# Patient Record
Sex: FEMALE | Race: WHITE | NOT HISPANIC OR LATINO | Employment: OTHER | URBAN - METROPOLITAN AREA
[De-identification: names, ages, dates, MRNs, and addresses within clinical notes are randomized per-mention and may not be internally consistent; named-entity substitution may affect disease eponyms.]

---

## 2017-01-05 ENCOUNTER — TELEPHONE (OUTPATIENT)
Dept: TRANSPLANT | Facility: CLINIC | Age: 67
End: 2017-01-05

## 2017-01-05 NOTE — TELEPHONE ENCOUNTER
----- Message from Shana Fitzgerald RN sent at 1/5/2017 10:50 AM CST -----  Contact: patient       ----- Message -----     From: Bren Jimenez     Sent: 1/4/2017  12:51 PM       To: [facility] Pre-Kidney Transplant Clinical    Calling to speak with you about some test that she is having done this month that need orders faxed. 2D echo and cardiac stress test please fax orders to  [facility]  or  please call pt at

## 2017-01-05 NOTE — TELEPHONE ENCOUNTER
Dr. Sacha Lund (neurologist) [phone] , will send letter for clearance    Colonoscopy done at [facility] 01/3/17 Dr. Polo Sears. Will get records (path report)    Cards: Dr. Jesus Carrero will need stress and echo with clearance    Will need to send order for 2nd ABO.

## 2017-01-24 ENCOUNTER — TELEPHONE (OUTPATIENT)
Dept: TRANSPLANT | Facility: CLINIC | Age: 67
End: 2017-01-24

## 2017-01-24 NOTE — TELEPHONE ENCOUNTER
----- Message from Erika Sebastian sent at 1/24/2017 10:25 AM CST -----  Contact: Gretchen cortez  Requesting a fax of pt's status at fax # [phone] if needed Gretchen can be reached at   [phone]

## 2017-02-10 ENCOUNTER — COMMITTEE REVIEW (OUTPATIENT)
Dept: TRANSPLANT | Facility: CLINIC | Age: 67
End: 2017-02-10

## 2017-02-10 NOTE — COMMITTEE REVIEW
Native Organ Dx: Membranous Glomerulonephritis      SELECTION COMMITTEE NOTE    Britt Benton was presented at selection committee on .  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  No absolute contraindications to transplant at this time.  Patient will be placed on the cadaveric wait list pending neurology clarification of seizure disorder, obtain UPC.  and final financial approval from insurance company.  Patient will return to clinic for routine appointment in 6 month(s).    I spoke with pt and informed her committee decision and need for follow up consult with an [facility] neurologist. She verbalized understanding. We also will request urine for protein/creatinine ratio.    Note written by Xena Salazar RN    ===============================================    I was present at the meeting and attest to the decision of the committee.

## 2017-02-10 NOTE — LETTER
February 10, 2017    Al Neves  [address]  [place] [address]         Dear Dr. Al Neves    Patient: Britt Benton   MR Number: [id number]   YOB: 1950     Your patient, Britt Benton, was recently discussed at the [facility]ney Selection Committee.  I am happy to inform you that Britt has been approved for transplantation pending neurology consult at [facility]. She also needs a baseline urine for protein/creatinine ratio.  She has met selection criteria for a kidney transplant related to  of Membranous Glomerulonephritis. Your patient will be placed on the cadaveric wait list pending final financial approval from insurance company.     We appreciate your confidence in allowing us to participate in your patients care.      If you have any questions or concerns, please do not hesitate to contact me.    Sincerely,      Lili Guzman MD  Medical Director, Kidney & Kidney/Pancreas Transplantation    Cc: Britt Benton/[facility].

## 2017-02-14 DIAGNOSIS — Z76.82 ORGAN TRANSPLANT CANDIDATE: Primary | ICD-10-CM

## 2017-03-22 ENCOUNTER — OFFICE VISIT (OUTPATIENT)
Dept: NEUROLOGY | Facility: CLINIC | Age: 67
End: 2017-03-22
Payer: MEDICARE

## 2017-03-22 VITALS
DIASTOLIC BLOOD PRESSURE: 88 MMHG | BODY MASS INDEX: 25.94 KG/M2 | SYSTOLIC BLOOD PRESSURE: 122 MMHG | HEART RATE: 80 BPM | HEIGHT: 63 IN | WEIGHT: 146.38 LBS

## 2017-03-22 DIAGNOSIS — G40.802 EPILEPSY WITH BOTH GENERALIZED AND FOCAL FEATURES: Primary | ICD-10-CM

## 2017-03-22 DIAGNOSIS — I63.9 STROKE OF UNKNOWN ETIOLOGY: ICD-10-CM

## 2017-03-22 DIAGNOSIS — Z76.82 ORGAN TRANSPLANT CANDIDATE: ICD-10-CM

## 2017-03-22 PROCEDURE — 99999 PR PBB SHADOW E&M-EST. PATIENT-LVL III: CPT | Mod: PBBFAC,,, | Performed by: PSYCHIATRY & NEUROLOGY

## 2017-03-22 PROCEDURE — 1157F ADVNC CARE PLAN IN RCRD: CPT | Mod: S$GLB,,, | Performed by: PSYCHIATRY & NEUROLOGY

## 2017-03-22 PROCEDURE — 1160F RVW MEDS BY RX/DR IN RCRD: CPT | Mod: S$GLB,,, | Performed by: PSYCHIATRY & NEUROLOGY

## 2017-03-22 PROCEDURE — 1126F AMNT PAIN NOTED NONE PRSNT: CPT | Mod: S$GLB,,, | Performed by: PSYCHIATRY & NEUROLOGY

## 2017-03-22 PROCEDURE — 99205 OFFICE O/P NEW HI 60 MIN: CPT | Mod: S$GLB,,, | Performed by: PSYCHIATRY & NEUROLOGY

## 2017-03-22 PROCEDURE — 1159F MED LIST DOCD IN RCRD: CPT | Mod: S$GLB,,, | Performed by: PSYCHIATRY & NEUROLOGY

## 2017-03-22 NOTE — LETTER
March 22, 2017      Briana Acevedo, BG  [address]  [place] [address]           [facility]  [address] [place] [address]  Phone: [phone]  Fax: [phone]          Patient: Britt Benton   MR Number: [id number]   YOB: 1950   Date of Visit: 3/22/2017       Dear Briana Acevedo:    Thank you for referring Britt Benton to me for evaluation. Attached you will find relevant portions of my assessment and plan of care.    If you have questions, please do not hesitate to call me. I look forward to following Britt Benton along with you.    Sincerely,    Walter Bernard MD    Enclosure  CC:  No Recipients    If you would like to receive this communication electronically, please contact [email or web address] or [phone] to request more information on [employer] access.    For providers and/or their staff who would like to refer a patient to [facility], please contact us through our one-stop-shop provider referral line, [facility], at [phone].    If you feel you have received this communication in error or would no longer like to receive these types of communications, please e-mail [email or web address]

## 2017-03-22 NOTE — PROGRESS NOTES
Is a 67-year-old right-handed patient who apparently has had an episode of several seizures.  The patient's history actually begins in December, 2015 when she reports that she had a series of strokes.  The strokes are manifested as difficulty speaking, dropping objects from the left hand, and having transient confusion.  As an example, the patient states that at the time of her stroke, she was having difficulty dressing her granddaughter and could not figure out how to put fitted sheets on her bed.  She then had repeated episodes of emesis and was finally taken to the hospital for evaluation in [place].  The patient underwent diagnostic studies demonstrating the presence of a right parietal occipital lobe infarct.    The patient apparently recovered from that incident but then in August, 2016 she apparently had a series of seizures.  The patient states that her first seizure was manifested primarily as a sudden unexplained loss of consciousness that was preceded by a fairly severe feeling of nausea.  The second episode occurred while she was in her kitchen, the patient last remembers standing at a counter and next remembers waking up having fallen backward and her kitchen and was sitting up on the kitchen floor.  She was seen by her primary care 1-2 days later and was found to have marked hypertension.  At about the same time, the patient was undergoing evaluation for her end-stage renal failure and preparing to begin peritoneal dialysis.    While in the training classes for her peritoneal dialysis, the patient had several episodes that were witnessed by family members.  The patient's friend who is with her indicates that she would all of a sudden turn head and eyes up and to the right and would not be able to answer questions or follow commands.  There did not appear to be any involuntary movements at that time but the patient was basically unresponsive with head and eyes deviated.  The patient's eyes appeared  "to be "glazed."  She had at least 3 of these episodes and finally was placed on Keppra, 250 mg twice a day in September, 2016.  It should be noted that at the time that these events occurred, the patient was severely uremic.    The patient has not had any further seizure or suggestion of seizure.  She's not had any further episodes of unexplained loss of consciousness.  The patient is now on her peritoneal dialysis nightly and indicates that her laboratory values have significantly improved.  She is now normotensive and has been able to be removed for most of her antihypertensive medications.  The patient who was previously being treated for diabetes, is no longer on medications for diabetes either.  The patient is now in consideration for renal transplant.      ROS:  GENERAL: No fever, chills, fatigability or weight loss.  SKIN: No rashes, itching or changes in color or texture of skin.  HEAD: No headaches or recent head trauma.  EYES: Visual acuity fine. No photophobia, ocular pain or diplopia.  EARS: Denies ear pain, discharge or vertigo.  NOSE: No loss of smell, no epistaxis or postnasal drip.  MOUTH & THROAT: No hoarseness or change in voice. No excessive gum bleeding.  NODES: Denies swollen glands.  CHEST: Denies BISWAS, cyanosis, wheezing, cough and sputum production.  CARDIOVASCULAR: Denies chest pain, PND, orthopnea or reduced exercise tolerance.  ABDOMEN: Appetite fine. No weight loss. Denies diarrhea, abdominal pain, hematemesis or blood in stool.  URINARY: No flank pain, dysuria or hematuria.  PERIPHERAL VASCULAR: No claudication or cyanosis.  MUSCULOSKELETAL: No joint stiffness or swelling.   NEUROLOGIC: No history of  paralysis, alteration of gait or coordination.    PAST HISTORY:  Surgery: Hysterectomy, oophorectomy, right benign breast biopsy, renal biopsy, peritoneal catheter insertion  Medical: Hypertension, hyperlipidemia, hypothyroidism, stroke, history of diabetes mellitus type 2, chronic renal " failure, end-stage  ALLERGIES: NO KNOWN DRUG ALLERGIES    FAMILY HISTORY:  The patient's parents are both .  The patient's father had a GI cancer and her mother also had a GI tract cancer.  She has a sister has had colon cancer and also has diabetes mellitus.  There is no known family history of epilepsy or other forms of seizure.    SOCIAL HISTORY:  The patient is not at this time utilizing alcoholic beverage.  She is a former smoker who has quit smoking.  She smoked for a very brief period of time for about 6 months in .  The patient is a retired .    PE:   VITAL SIGNS: Blood pressure 122/88, pulse 80, weight 66.4 kg, height 5 foot 3 inches, BMI 25.93   APPEARANCE: Well nourished, well developed, in no acute distress.    HEAD: Normocephalic, atraumatic.  EYES: PERRL. EOMI.  Non-icteric sclerae.    EARS: TM's intact. Light reflex normal. No retraction or perforation.    NOSE: Mucosa pink. Airway clear.  MOUTH & THROAT: No tonsillar enlargement. No pharyngeal erythema or exudate. No stridor.  NECK: Supple. No bruits.  CHEST: Lungs clear to auscultation.  CARDIOVASCULAR: Regular rhythm without significant murmurs.  ABDOMEN: Bowel sounds normal. Not distended.   MUSCULOSKELETAL:  No bony deformity seen.  Muscle tone and muscle mass are normal in both upper and both lower extremities.  NEUROLOGIC:   Mental status: The patient is oriented to person, place, time.  The patient is able to recall 3 unrelated words at 3 minutes without difficulty.  The patient is able to focus on the exam and follow 3-step commands without difficulty.  The patient has intact language function including naming and syntax.  The patient shows good awareness of current events and has a good fund of general knowledge.  The patient is pleasant and cooperative for the examination.    Cranial nerves:  II: visual fields are intact by confrontation.  The patient is able to perceive the color red as the same intensity in each eye.   Funduscopic examination does not show any evidence of papilledema, hemorrhage, or exudate.  Visual acuity with hand held chart is 20/30 left eye, 20/100 right eye.  (The patient states that she is being evaluated for cataract extraction and lens implant)  III, IV, VI: The extra ocular muscles are intact in the cardinal directions of gaze.  No ptosis is present.  Pupils are briskly reactive to light bilaterally.  V: Facial sensation is intact to light touch in all 3 divisions of the fifth cranial nerve.  Masseter function is equally strong.  Corneal reflexes are present and brisk.  VII: Facial features are symmetrical.  The patient has a symmetrical grimace, forced eyelid closure, and for head elevation.  VIII: Hearing is intact to voice and finger rub.  IX, X: The patient's uvula elevates in the midline.  Upon phonation, there is symmetrical elevation of the palate.  Gag reflex is intact bilaterally.  XI: The patient has a symmetrical shoulder shrug.  The sternocleidomastoid muscles are symmetrically strong.  No atrophy is present.  XII: The patient's tongue protrudes in the midline.  There is no atrophy present.  Articulation is clear without dysarthria.    Gait and Station: Romberg is negative.  The patient ambulates with a good alternate arm swing.  The patient is able to heel walk and toe walk.    Motor: Manual muscle testing of both upper and lower extremities shows grade 5/5 strength bilaterally.  The patient's strength is normal for age and body habitus.  Examination of the extremities does not show any evidence of atrophy or other deformity.  Muscle tone is normal both upper and lower extremities.    Sensory: The patient has intact perception of pinprick, light touch, and vibration in both upper and lower extremities.  Position sense is intact in both upper and lower extremities.  The patient does not extinct on double simultaneous sensory stimulation.    Cerebellar: The patient is able to perform  finger-to-nose and heel-to-shin without difficulty.  No involuntary movements are seen at rest.  Muscle tone is normal.  Rapid alternating movements are done without difficulty.  Coordination of alternating movements is normal.    Reflexes: Stretch reflexes including biceps, triceps, knees, and ankles are normal bilaterally.  Plantar stimulation is flexor bilaterally.  No pathological reflexes are seen.    MRI brain:  The patient was able to bring with her copies of the MRI brain that was done at the time of her stroke and subsequently at the time of her seizure.  There is evidence of a right parietal occipital area of cerebral infarction without hemorrhage or mass effect.  This appears to be an older stroke.  No acute changes are otherwise seen.    ASSESSMENT:  1.  History of stroke, distal branches, right middle cerebral artery  2.  History of focal seizure  3.  End-stage renal disease currently on peritoneal dialysis    DISCUSSION:  The patient has had a series of 3 seizures that occurred at the time of difficulty and uremia, but the patient does have the risk factor for seizure in the form of her stroke that had occurred previously.  Although the patient is now asymptomatic, I would recommend that the patient continue taking Keppra 250 mg twice a day.  The presence of the prior stroke as well as the presence of her seizure disorder does not preclude the patient from undergoing renal transplant.  The patient's risk for seizure is extremely low as long as she continues taking her Keppra.  At the time of the surgical procedure, the patient may receive Keppra intravenously until she can take oral medication.  She is already on a renally adjusted dose for the Keppra.  This does not need to be changed.

## 2017-04-25 ENCOUNTER — TELEPHONE (OUTPATIENT)
Dept: TRANSPLANT | Facility: CLINIC | Age: 67
End: 2017-04-25

## 2017-04-25 NOTE — TELEPHONE ENCOUNTER
----- Message from Era Rahamn sent at 4/25/2017  9:27 AM CDT -----  Contact: pt  Calling to speak with Chelsea wants to know about a form that was suppose to be faxed from [facility] @ # [phone].

## 2017-04-25 NOTE — TELEPHONE ENCOUNTER
Patient informed that we are still awaiting [phone] for listing. 2728 was requested 4 times already. Patient is strongly encouraged to contact her unit for a copy as she was informed that she cannot be listed without it. Patient verbalized understanding.

## 2017-05-04 ENCOUNTER — TELEPHONE (OUTPATIENT)
Dept: TRANSPLANT | Facility: CLINIC | Age: 67
End: 2017-05-04

## 2017-05-04 NOTE — TELEPHONE ENCOUNTER
Patient informed that all of her information was sent to procurement for listing. Patient verbalized understanding.

## 2017-05-04 NOTE — TELEPHONE ENCOUNTER
----- Message from Shana Fitzgerald RN sent at 5/4/2017  1:06 PM CDT -----  Contact: pt      ----- Message -----     From: Bree Whiteside     Sent: 5/4/2017   8:36 AM       To: [facility] Pre-Kidney Transplant Clinical    Asking if fax was received from [facility] and is there anything else needed to become listed. Please call back [phone]

## 2017-05-05 DIAGNOSIS — Z76.82 AWAITING ORGAN TRANSPLANT STATUS: Primary | ICD-10-CM

## 2017-07-10 ENCOUNTER — PATIENT MESSAGE (OUTPATIENT)
Dept: TRANSPLANT | Facility: CLINIC | Age: 67
End: 2017-07-10

## 2017-07-14 ENCOUNTER — LAB VISIT (OUTPATIENT)
Dept: LAB | Facility: HOSPITAL | Age: 67
End: 2017-07-14
Payer: MEDICARE

## 2017-07-14 DIAGNOSIS — Z76.82 AWAITING ORGAN TRANSPLANT STATUS: ICD-10-CM

## 2017-07-14 PROCEDURE — 86833 HLA CLASS II HIGH DEFIN QUAL: CPT | Mod: PO,TXP

## 2017-07-14 PROCEDURE — 86832 HLA CLASS I HIGH DEFIN QUAL: CPT | Mod: PO,TXP

## 2017-08-01 DIAGNOSIS — Z76.82 ORGAN TRANSPLANT CANDIDATE: ICD-10-CM

## 2017-08-10 LAB — HPRA INTERPRETATION: NORMAL

## 2017-08-18 LAB
CLASS I ANTIBODIES - LUMINEX: NEGATIVE
CLASS II ANTIBODIES - LUMINEX: NEGATIVE
CPRA %: 0
SERUM COLLECTION DT - LUMINEX CLASS I: NORMAL
SERUM COLLECTION DT - LUMINEX CLASS II: NORMAL
SPCL1 TESTING DATE: NORMAL
SPCL2 TESTING DATE: NORMAL
SPCLU TESTING DATE: NORMAL

## 2017-08-21 DIAGNOSIS — Z76.82 ORGAN TRANSPLANT CANDIDATE: Primary | ICD-10-CM

## 2017-09-11 ENCOUNTER — LAB VISIT (OUTPATIENT)
Dept: LAB | Facility: HOSPITAL | Age: 67
End: 2017-09-11
Payer: MEDICARE

## 2017-09-11 DIAGNOSIS — Z76.82 ORGAN TRANSPLANT CANDIDATE: ICD-10-CM

## 2017-09-11 PROCEDURE — 86829 HLA CLASS I/II ANTIBODY QUAL: CPT | Mod: PO,TXP

## 2017-09-11 PROCEDURE — 86832 HLA CLASS I HIGH DEFIN QUAL: CPT | Mod: PO,TXP

## 2017-09-11 PROCEDURE — 86833 HLA CLASS II HIGH DEFIN QUAL: CPT | Mod: PO,TXP

## 2017-09-11 PROCEDURE — 86829 HLA CLASS I/II ANTIBODY QUAL: CPT | Mod: 91,PO,TXP

## 2017-09-21 LAB — HPRA INTERPRETATION: NORMAL

## 2017-10-05 LAB
CLASS I ANTIBODY COMMENTS - LUMINEX: NORMAL
CLASS II ANTIBODIES - LUMINEX: NEGATIVE
CPRA %: 0
SERUM COLLECTION DT - LUMINEX CLASS I: NORMAL
SERUM COLLECTION DT - LUMINEX CLASS II: NORMAL
SPCL1 TESTING DATE: NORMAL
SPCL2 TESTING DATE: NORMAL
SPCLU TESTING DATE: NORMAL

## 2017-12-15 ENCOUNTER — LAB VISIT (OUTPATIENT)
Dept: LAB | Facility: HOSPITAL | Age: 67
End: 2017-12-15
Payer: MEDICARE

## 2017-12-15 DIAGNOSIS — Z76.82 AWAITING ORGAN TRANSPLANT STATUS: ICD-10-CM

## 2017-12-15 PROCEDURE — 86833 HLA CLASS II HIGH DEFIN QUAL: CPT | Mod: PO,TXP

## 2017-12-15 PROCEDURE — 86832 HLA CLASS I HIGH DEFIN QUAL: CPT | Mod: PO,TXP

## 2017-12-27 LAB — HPRA INTERPRETATION: NORMAL

## 2018-01-08 ENCOUNTER — TELEPHONE (OUTPATIENT)
Dept: RADIOLOGY | Facility: HOSPITAL | Age: 68
End: 2018-01-08

## 2018-01-09 ENCOUNTER — OFFICE VISIT (OUTPATIENT)
Dept: TRANSPLANT | Facility: CLINIC | Age: 68
End: 2018-01-09
Payer: MEDICARE

## 2018-01-09 ENCOUNTER — HOSPITAL ENCOUNTER (OUTPATIENT)
Dept: RADIOLOGY | Facility: HOSPITAL | Age: 68
Discharge: HOME | End: 2018-01-09
Attending: NURSE PRACTITIONER
Payer: MEDICARE

## 2018-01-09 ENCOUNTER — HOSPITAL ENCOUNTER (OUTPATIENT)
Dept: CARDIOLOGY | Facility: CLINIC | Age: 68
Discharge: HOME | End: 2018-01-09
Payer: MEDICARE

## 2018-01-09 VITALS
HEART RATE: 84 BPM | SYSTOLIC BLOOD PRESSURE: 167 MMHG | RESPIRATION RATE: 16 BRPM | OXYGEN SATURATION: 98 % | WEIGHT: 151.44 LBS | DIASTOLIC BLOOD PRESSURE: 99 MMHG | TEMPERATURE: 98 F | HEIGHT: 63 IN | BODY MASS INDEX: 26.83 KG/M2

## 2018-01-09 DIAGNOSIS — Z76.82 PATIENT ON WAITING LIST FOR KIDNEY TRANSPLANT: Chronic | ICD-10-CM

## 2018-01-09 DIAGNOSIS — Z79.01 CURRENT USE OF LONG TERM ANTICOAGULATION: Chronic | ICD-10-CM

## 2018-01-09 DIAGNOSIS — Z76.82 ORGAN TRANSPLANT CANDIDATE: ICD-10-CM

## 2018-01-09 DIAGNOSIS — G40.802 EPILEPSY WITH BOTH GENERALIZED AND FOCAL FEATURES: ICD-10-CM

## 2018-01-09 DIAGNOSIS — N18.9 ANEMIA OF RENAL DISEASE: Chronic | ICD-10-CM

## 2018-01-09 DIAGNOSIS — I10 ESSENTIAL HYPERTENSION: Chronic | ICD-10-CM

## 2018-01-09 DIAGNOSIS — D63.1 ANEMIA OF RENAL DISEASE: Chronic | ICD-10-CM

## 2018-01-09 DIAGNOSIS — Z99.2 ESRD ON PERITONEAL DIALYSIS: Primary | Chronic | ICD-10-CM

## 2018-01-09 DIAGNOSIS — I63.9 STROKE OF UNKNOWN ETIOLOGY: ICD-10-CM

## 2018-01-09 DIAGNOSIS — E11.21 TYPE 2 DIABETES MELLITUS WITH DIABETIC NEPHROPATHY, UNSPECIFIED LONG TERM INSULIN USE STATUS: Chronic | ICD-10-CM

## 2018-01-09 DIAGNOSIS — N18.6 ESRD ON PERITONEAL DIALYSIS: Primary | Chronic | ICD-10-CM

## 2018-01-09 LAB — DIASTOLIC DYSFUNCTION: NO

## 2018-01-09 PROCEDURE — A9502 TC99M TETROFOSMIN: HCPCS | Mod: TXP

## 2018-01-09 PROCEDURE — 71046 X-RAY EXAM CHEST 2 VIEWS: CPT | Mod: 26,TXP,, | Performed by: RADIOLOGY

## 2018-01-09 PROCEDURE — 71046 X-RAY EXAM CHEST 2 VIEWS: CPT | Mod: TC,TXP

## 2018-01-09 PROCEDURE — 99999 PR PBB SHADOW E&M-EST. PATIENT-LVL IV: CPT | Mod: PBBFAC,TXP,,

## 2018-01-09 PROCEDURE — 78452 HT MUSCLE IMAGE SPECT MULT: CPT | Mod: 26,TXP,, | Performed by: RADIOLOGY

## 2018-01-09 PROCEDURE — 99215 OFFICE O/P EST HI 40 MIN: CPT | Mod: S$GLB,TXP,, | Performed by: INTERNAL MEDICINE

## 2018-01-09 PROCEDURE — 93015 CV STRESS TEST SUPVJ I&R: CPT | Mod: S$GLB,TXP,, | Performed by: INTERNAL MEDICINE

## 2018-01-09 RX ORDER — DOCUSATE SODIUM 100 MG/1
100 CAPSULE, LIQUID FILLED ORAL 2 TIMES DAILY
COMMUNITY
End: 2022-09-19

## 2018-01-09 RX ORDER — FERROUS SULFATE 324(65)MG
325 TABLET, DELAYED RELEASE (ENTERIC COATED) ORAL DAILY
COMMUNITY
End: 2018-08-10

## 2018-01-09 RX ORDER — CHOLECALCIFEROL (VITAMIN D3) 25 MCG
5000 TABLET ORAL DAILY
Status: ON HOLD | COMMUNITY
End: 2021-03-29 | Stop reason: HOSPADM

## 2018-01-09 RX ORDER — MONTELUKAST SODIUM 10 MG/1
10 TABLET ORAL DAILY
Status: ON HOLD | COMMUNITY
End: 2021-03-26 | Stop reason: SDUPTHER

## 2018-01-09 RX ORDER — LEVOCETIRIZINE DIHYDROCHLORIDE 5 MG/1
5 TABLET, FILM COATED ORAL NIGHTLY PRN
COMMUNITY
End: 2019-02-21

## 2018-01-09 NOTE — PROGRESS NOTES
Kidney Transplant Recipient Reevalulation    Referring Physician: Al Neves  Current Nephrologist: Al Neves  Waitlist Status: active  Dialysis Start Date: 9/27/2016    Subjective:     CC:  Six month reassessment of kidney transplant candidacy.    HPI:  Ms. Benton is a 67 y.o. 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  She has been on the wait list for a kidney transplant at [facility] since 9/27/2016. Patient is currently on peritoneal dialysis started on 9/27/2016. Patient is dialyzing on cyclic peritoneal dialysis.  Patient reports that she is tolerating dialysis well. . She has a PD catheter. Patient denies any recent hospitalizations or ED visits.      Exercises regularly walking 2 miles /day. She tracks her steps  with a fit bit.  She also attends curves 5 x / week   Overall feels well. No health concerns today.   Denies chest pain, SOB, leg pain, abdominal pain or LUTs.  Denies peritonitis     1/9/2018  Chest xray  Impression    No acute process seen.       1/9/2018  NM spect  Narrative   Findings: Patient was administered 11.4 and 32.3 mCi of technetium 99m labeled Cardiolite for the resting and stress part of the exam. Patient was stressed using 0.4 mg of eden scan.: There is mild breast attenuation. There is normal perfusion at stress and rest. There is no evidence of ischemia or infarct. Ejection fraction is 76% stress, 71% rest. These are overestimation's. End diastolic volume is 42 mL, systolic volume is 10 mL. These are underestimation. There is good thickening and contraction of all myocardial walls.   Impression    Normal myocardial perfusion scan. This           Past Medical History:   Diagnosis Date    Anemia of renal disease     ESRD on peritoneal dialysis     Essential hypertension     Hyperlipidemia     Hypothyroidism     Stroke x2 in 2014 and 2015     Type 2 diabetes mellitus with diabetic nephropathy     Vitamin D deficiency        Review of  "Systems   Constitutional: Negative for activity change, appetite change, chills, fatigue, fever and unexpected weight change.   HENT: Negative for congestion, facial swelling, postnasal drip, rhinorrhea, sinus pressure, sore throat and trouble swallowing.    Eyes: Negative for pain, redness and visual disturbance.   Respiratory: Negative for cough, chest tightness, shortness of breath and wheezing.    Cardiovascular: Negative.  Negative for chest pain, palpitations and leg swelling.   Gastrointestinal: Positive for abdominal distention. Negative for abdominal pain, diarrhea, nausea and vomiting.        PD catheter   Genitourinary: Negative for dysuria, flank pain and urgency.   Musculoskeletal: Negative for gait problem, neck pain and neck stiffness.   Skin: Negative for rash.   Allergic/Immunologic: Negative for environmental allergies, food allergies and immunocompromised state.   Neurological: Negative for dizziness, weakness, light-headedness and headaches.   Psychiatric/Behavioral: Negative for agitation and confusion. The patient is not nervous/anxious.        Objective:   body mass index is 27.17 kg/m².  BP (!) 167/99 (BP Location: Left arm, Patient Position: Sitting, BP Method: Medium (Automatic))   Pulse 84   Temp 97.8 °F (36.6 °C) (Oral)   Resp 16   Ht 5' 2.6" (1.59 m)   Wt 68.7 kg (151 lb 7.3 oz)   SpO2 98%   BMI 27.17 kg/m²     Physical Exam   Constitutional: She is oriented to person, place, and time. She appears well-developed and well-nourished.   HENT:   Head: Normocephalic.   Mouth/Throat: Oropharynx is clear and moist. No oropharyngeal exudate.   Eyes: Conjunctivae and EOM are normal. Pupils are equal, round, and reactive to light. No scleral icterus.   Neck: Normal range of motion. Neck supple.   Cardiovascular: Normal rate, regular rhythm and normal heart sounds.    Pulmonary/Chest: Effort normal and breath sounds normal.   Abdominal: Soft. Normal appearance and bowel sounds are normal. " She exhibits no distension and no mass. There is no splenomegaly or hepatomegaly. There is no tenderness. There is no rebound, no guarding, no CVA tenderness, no tenderness at McBurney's point and negative Batres's sign.       Musculoskeletal: Normal range of motion. She exhibits no edema.   Lymphadenopathy:     She has no cervical adenopathy.   Neurological: She is alert and oriented to person, place, and time. She exhibits normal muscle tone. Coordination normal.   Skin: Skin is warm and dry.   Psychiatric: She has a normal mood and affect. Her behavior is normal.   Vitals reviewed.      Labs:  Lab Results   Component Value Date    WBC 8.87 11/15/2016    HGB 7.9 (L) 11/15/2016    HCT 24.2 (L) 11/15/2016     11/15/2016    K 3.0 (L) 11/15/2016     11/15/2016    CO2 24 11/15/2016    BUN 57 (H) 11/15/2016    CREATININE 10.1 (H) 11/15/2016    EGFRNONAA 3.6 (A) 11/15/2016    CALCIUM 9.3 11/15/2016    PHOS 6.2 (H) 11/15/2016    ALBUMIN 3.1 (L) 11/15/2016    AST 11 11/15/2016    ALT 10 11/15/2016    .0 (H) 11/15/2016       No results found for: PREALBUMIN, BILIRUBINUA, GGT, AMYLASE, LIPASE, PROTEINUA, NITRITE, RBCUA, WBCUA    No results found for: HLAABCTYPE    Lab Results   Component Value Date    CPRA 0 12/11/2017    MI0YHAE Negative 12/11/2017    CIABCLM WEAK B*40:02 09/05/2017    CIIAB Negative 12/11/2017       Labs were reviewed with the patient.    Pre-transplant Workup:   Reviewed with the patient.    Assessment:     1. ESRD on peritoneal dialysis    2. Patient on waiting list for kidney transplant    3. Anemia of renal disease    4. Type 2 diabetes mellitus with diabetic nephropathy, unspecified long term insulin use status    5. Essential hypertension    6. Epilepsy with both generalized and focal features    7. Stroke x3 in 2014 and 2015    8. Current use of long term anticoagulation--Plavix        Plan:   colonoscopy due 1/2020  MMG due 5/2018      Transplant Candidacy:   Ms. Benton is a  suitable kidney transplant candidate.  She remains in overall stable health, and will remain active on the transplant list.    Briana Acevedo NP       Follow-up:   In addition to the tests noted in the plan, Ms. Benton will continue to have reevaluation as per the standing pre-kidney transplant protocol:  1. Monthly blood for PRA  2. Annual return to clinic, except HIV positive, > 65 years of age, or pancreas transplant candidates who will be scheduled to see transplant every 6 months while in pre-transplant phase  3. Annual re-testing: CXR, EKG, yearly mammograms for women over 40 and PSA for males over 40, cardiology follow-up as recommended by initial cardiology pre-transplant evaluation  4. Renal ultrasound every 2 years  5. Baseline colonoscopy after age 50 and repeated as recommended    UNOS Patient Status  Functional Status: 60% - Requires occasional assistance but is able to care for needs  Physical Capacity: No Limitations

## 2018-01-09 NOTE — PROGRESS NOTES
"Transplant Recipient Adult Psychosocial Assessment (UPDATE)    Britt Benton  [address]  [facility] [address]    Telephone Information:   Mobile [phone]   Home  [phone] (home)  Work  There is no work phone number on file.  E-mail  [email or web address]    Sex: female  YOB: 1950  Age: 67 y.o.    Encounter Date: 1/9/2018  [place] Citizen: yes  Primary Language: English   Needed: no    Emergency Contact:  Name: Kali Luna (pronounced "East")  Relationship: friend (Pt and Kali report that they've been friends since they were 10 yo)  Address: [address] [place] [address]  Phone Numbers:  [phone] (home), [phone] (mobile)    Family/Social Support:   Number of dependents/: Pt reports no dependents.  Marital history: Pt reports 1 previous marriage which ended in divorce. Pt reports no current significant other.  Other family dynamics: Pt presents with close friends: Kali and Lana Luna; sister: Esmer; and 2 adult children: Daniel and Joe whom pt  Identifies as supportive. Pt also reports 3 brothers: Robert, Eric, and Carlos.    Household Composition:  Name: Britt Benton  Age: 67  Relationship: patient  Does person drive? yes    Do you and your caregivers have access to reliable transportation? yes     PRIMARY CAREGIVER: Orin Luna (friend) will be primary caregiver, phone number [phone].      provided in-depth information to patient and caregiver regarding pre- and post-transplant caregiver role.   strongly encourages patient and caregiver to have concrete plan regarding post-transplant care giving, including back-up caregiver(s) to ensure care giving needs are met as needed.    Patient and Caregiver states understanding all aspects of caregiver role/commitment and is able/willing/committed to being caregiver to the fullest extent necessary.    Patient and Caregiver verbalizes " "understanding of the education provided today and caregiver responsibilities.         remains available. Patient and Caregiver agree to contact  in a timely manner if concerns arise.      Able to take time off work without financial concerns: yes.     Additional Significant Others who will Assist with Transplant:  Name: Esmer Adams  Age: 85  Phone: [phone]  City: [place] State: [place]  Relationship: sister  Does person drive? yes    Living Will: no Education and information provided to pt. Pt reports that she has already discussed her end-of-life desires with her sons.  Daniel Benton ([phone])  Healthcare Power of : no Education and information provided to pt. Pt reports that she has already discussed her end-of-life desires with her sons.  Daniel Benton ([phone])  Advance Directives on file: <<no information> per medical record.  Verbally reviewed LW/HCPA information.   provided patient with copy of LW/HCPA documents and provided education on completion of forms.    Living Donors: No. Education and resource information given to patient.    Highest Education Level: High School (9-12) or GED  Reading Ability: college  Reports difficulty with: seeing and wears bifocals  Learns Best By:  Pt reports pt learns best by a combination of verbal, written, and tactile instructions.     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    Spouse/Significant Other Employment: Pt reports no current significant other.    Disabled: no    Monthly Income:  longterm: $1817  SSI: $161     Able to afford all costs now and if transplanted, including medications: yes. Pt reports that she "hardly has any bills", budgets well, and reports family and friends will assist if necessary.  Patient and Caregiver verbalizes understanding " of personal responsibilities related to transplant costs and the importance of having a financial plan to ensure that patients transplant costs are fully covered.       provided fundraising information/education. Patient and Caregiververbalizes understanding.   remains available.    Insurance:   Payor/Plan Subscr  Sex Relation Sub. Ins. ID Effective Group Num   1. [employer] MANAGE* KATIE KNOX 1950 Female  [id number] 1/1/15 [id number]                                   [address] [address]     Primary Insurance (for UNOS reporting): Public Insurance - Medicare & Choice  Secondary Insurance (for UNOS reporting): None  Patient and Caregiver verbalizes clear understanding that patient may experience difficulty obtaining and/or be denied insurance coverage post-surgery. This includes and is not limited to disability insurance, life insurance, health insurance, burial insurance, long term care insurance, and other insurances.      Patient and Caregiver also reports understanding that future health concerns related to or unrelated to transplantation may not be covered by patient's insurance.  Resources and information provided and reviewed.     Patient and Caregiver provides verbal permission to release any necessary information to outside resources for patient care and discharge planning.  Resources and information provided are reviewed.      Dialysis Adherence: Patient reports being on peritoneal dialysis attending all dialysis appointments and completing the entire course of treatment. SW faxed an adherence form (see Letters section) and is awaiting a fax back.    Infusion Service: patient utilizing? no  Home Health: patient utilizing? no Pt reports using HH in Aug. 2016 after her surgery. Pt reports they came out for wound care for 3-4 days.  DME: yes PD equipment, BP Cuff, and Glucometer  Pulmonary/Cardiac Rehab: Pt denies   ADLS:  Pt reports no difficulties with driving, walking, bathing,  "cooking, housekeeping, eating, shopping, and taking medication.    Adherence:   Pt reports good adherence with medications, dialysis, and health regimen.  Adherence education and counseling provided.     Per History Section:  Past Medical History:   Diagnosis Date    Anemia of renal disease     ESRD on peritoneal dialysis     Essential hypertension     Hyperlipidemia     Hypothyroidism     Stroke x2 in 2014 and 2015     Type 2 diabetes mellitus with diabetic nephropathy     Vitamin D deficiency      Social History   Substance Use Topics    Smoking status: Former Smoker    Smokeless tobacco: Never Used      Comment: smoked for ~6 months in 1968    Alcohol use No      Comment: previous social drinker     History   Drug Use No     History   Sexual Activity    Sexual activity: No       Per Today's Psychosocial:  Tobacco: Pt reports smoking for about 6 months in 1968, however reports that "The man I  made me quit".  Alcohol: Pt reports remote history of social ETOH use and reports no current use.  Illicit Drugs/Non-prescribed Medications: none, patient denies any use.    Patient and Caregiver states clear understanding of the potential impact of substance use as it relates to transplant candidacy and is aware of possible random substance screening.  Substance abstinence/cessation counseling, education and resources provided and reviewed.     Arrests/DWI/Treatment/Rehab: patient denies    Psychiatric History:    Mental Health: Pt reports no history of or current mental health issues or concerns.   Psychiatrist/Counselor: Pt denies seeing a mental health professional and reports being open to seeing the psych department for talk therapy if necessary.  Medications:  Pt denies taking medications for mental health reasons. Pt does report that she takes Keppra for seizures.  Suicide/Homicide Issues: Pt denies any history of or current suicidal or homicidal ideations.    Safety at home: Pt reports no current " or history of safety concerns in household.    Knowledge: Patient and Caregiver states having clear understanding and realistic expectations regarding the potential risks and potential benefits of organ transplantation and organ donation and agrees to discuss with health care team members and support system members, as well as to utilize available resources and express questions and/or concerns in order to further facilitate the pt informed decision-making.  Resources and information provided and reviewed.    Patient and Caregiver is aware of [facility]'s affiliation and/or partnership with agencies in home health care, LTAC, SNF, [facility], and other hospitals and clinics.    Understanding: Patient and Caregiver reports having a clear understanding of the many lifetime commitments involved with being a transplant recipient, including costs, compliance, medications, lab work, procedures, appointments, concrete and financial planning, preparedness, timely and appropriate communication of concerns, abstinence (ETOH, tobacco, illicit non-prescribed drugs), adherence to all health care team recommendations, support system and caregiver involvement, appropriate and timely resource utilization and follow-through, mental health counseling as needed/recommended, and patient and caregiver responsibilities.  Social Service Handbook, resources and detailed educational information provided and reviewed.  Educational information provided.    Patient and Caregiver also reports current and expected compliance with health care regime and states having a clear understanding of the importance of compliance.      Patient and Caregiver reports a clear understanding that risks and benefits may be involved with organ transplantation and with organ donation.       Patient and Caregiver also reports clear understanding that psychosocial risk factors may affect patient, and include but are not limited to feelings of depression, generalized anxiety,  anxiety regarding dependence on others, post traumatic stress disorder, feelings of guilt and other emotional and/or mental concerns, and/or exacerbation of existing mental health concerns.  Detailed resources provided and discussed.      Patient and Caregiver agrees to access appropriate resources in a timely manner as needed and/or as recommended, and to communicate concerns appropriately.  Patient and Caregiver also reports a clear understanding of treatment options available.     Patient and Caregiver received education in a group setting.   reviewed education, provided additional information, and answered questions.    Feelings or Concerns: Patient and Caregiver did not express any concerns at this time in regards to kidney transplant. Pt did express dissatisfaction that she had just learned about her kidney disease and reports that if her doctor would have said something earlier, it could have been prevented.    Coping: Pt reports coping well with the transplant process at this time and reports sitting at home and spending time with friends as ways to cope. Pt reports [facility] as [facility] in [place], [place] with Jerel Cintron presiding.     Goals: Pt reports staying healthy, traveling with friends, and getting off dialysis as goals for after transplant.    Interview Behavior: Patient and Caregiver presents as alert and oriented x 4, pleasant, good eye contact, well groomed, recall good, concentration/judgement good, average intelligence, calm, communicative, cooperative and asking and answering questions appropriately. Pt presents with friend: Kali Luna at pt's request.         Transplant Social Work - Candidacy  Assessment/Plan:     Psychosocial Suitability: Patient presents as unable to determine candidate for kidney transplant at this time. SW is awaiting a fax regarding pt's dialysis adherence. Based on psychosocial risk factors, patient presents as low risk,  due to adequate caregiver plan, insurance, and financial plan in place..    Recommendations/Additional Comments: SW recommends that pt conduct fundraising to assist pt with pay for cost of medications, food, gas, and other transplant related needs. SW recommends that pt remain aware of potential mental health concerns and contact the team if any concerns arise. SW recommends that pt remain abstinent from tobacco, ETOH, and drug use. SW remains available to answer any questions or concerns that arise as the pt moves through the transplant process.     Maicol Jean-Baptiste, WIL, LMSW

## 2018-01-09 NOTE — LETTER
Date: 1/9/2018          Listed Patient      To: Dialysis Unit  and Charge RN From: [facility] Workers and      Kidney Transplant Nurse Coordinators    RE: Britt Benton, 1950, [id number]     At [facility], we conduct adherence checks as an important part of transplant care. Initial and listed patient assessments are not complete without adherence information.        Please complete the following information:     Current Dry Weight: ___________         Most Recent Pre-Treatment Weight: ___________ /date: _________                    Data from the last 3 months:  (data from last 3 months preferred):    Number of AMAs with dates, time, and reasons: ____________________________________________________    ______________________________________________________________________________________________    ______________________________________________________________________________________________    Number of No-Shows with dates and reasons: ______________________________________________________      ______________________________________________________________________________________________    Last intact PTH:  ___________/date: __________      Any concerns with Labs:  YES / NO      If yes, please explain:  ___________________________________________________________________________    ______________________________________________________________________________________________    Any concerns with Caregivers:  YES / NO    If yes, please explain:  ___________________________________________________________________________    ______________________________________________________________________________________________     Any concerns with Transportation:  YES / NO    If yes, please explain:   ___________________________________________________________________________    ______________________________________________________________________________________________    Any Psychiatric and/or Psychosocial concerns:  YES / NO     If yes, please explain: ___________________________________________________________________________    ______________________________________________________________________________________________      PLEASE RETURN TO: FAX: [phone]     Thank you for collaborating with us in the care of this patient.           [address]  ?  [place] [address]  ?  phone [phone]  ?  fax [phone]  ?  [email or web address]  Confidentiality notice: The accompanying facsimile is intended solely for the use of the recipient designated above. Document(s) transmitted herewith may contain information that is confidential and privileged. Delivery, distribution or dissemination of this communication other than to the intended recipient is strictly prohibited. If you have received this facsimile in error, please notify us immediately by telephone.

## 2018-01-11 NOTE — PROGRESS NOTES
Patient's chart reviewed today. Patient due for follow-up in July 2018. Appointments will be scheduled per protocol. HLA sample current, in lab, and being received on a regular basis.

## 2018-01-15 ENCOUNTER — LAB VISIT (OUTPATIENT)
Dept: LAB | Facility: HOSPITAL | Age: 68
End: 2018-01-15
Payer: MEDICARE

## 2018-01-15 DIAGNOSIS — Z76.82 AWAITING ORGAN TRANSPLANT STATUS: ICD-10-CM

## 2018-01-15 PROCEDURE — 86829 HLA CLASS I/II ANTIBODY QUAL: CPT | Mod: 91,PO,TXP

## 2018-01-15 PROCEDURE — 86829 HLA CLASS I/II ANTIBODY QUAL: CPT | Mod: PO,TXP

## 2018-01-24 LAB — HPRA INTERPRETATION: NORMAL

## 2018-02-09 ENCOUNTER — LAB VISIT (OUTPATIENT)
Dept: LAB | Facility: HOSPITAL | Age: 68
End: 2018-02-09
Payer: MEDICARE

## 2018-02-09 DIAGNOSIS — Z76.82 AWAITING ORGAN TRANSPLANT STATUS: ICD-10-CM

## 2018-02-09 PROCEDURE — 86833 HLA CLASS II HIGH DEFIN QUAL: CPT | Mod: PO,TXP

## 2018-02-09 PROCEDURE — 86832 HLA CLASS I HIGH DEFIN QUAL: CPT | Mod: PO,TXP

## 2018-02-09 PROCEDURE — 86829 HLA CLASS I/II ANTIBODY QUAL: CPT | Mod: 91,PO,TXP

## 2018-02-09 PROCEDURE — 86829 HLA CLASS I/II ANTIBODY QUAL: CPT | Mod: PO,TXP

## 2018-02-15 LAB — HPRA INTERPRETATION: NORMAL

## 2018-05-03 ENCOUNTER — LAB VISIT (OUTPATIENT)
Dept: LAB | Facility: HOSPITAL | Age: 68
End: 2018-05-03
Payer: MEDICARE

## 2018-05-03 DIAGNOSIS — Z76.82 AWAITING ORGAN TRANSPLANT STATUS: ICD-10-CM

## 2018-05-03 PROCEDURE — 86833 HLA CLASS II HIGH DEFIN QUAL: CPT | Mod: PO,TXP

## 2018-05-03 PROCEDURE — 86832 HLA CLASS I HIGH DEFIN QUAL: CPT | Mod: PO,TXP

## 2018-05-19 LAB — HPRA INTERPRETATION: NORMAL

## 2018-06-15 DIAGNOSIS — Z76.82 ORGAN TRANSPLANT CANDIDATE: Primary | ICD-10-CM

## 2018-08-10 ENCOUNTER — OFFICE VISIT (OUTPATIENT)
Dept: TRANSPLANT | Facility: CLINIC | Age: 68
End: 2018-08-10
Payer: MEDICARE

## 2018-08-10 ENCOUNTER — HOSPITAL ENCOUNTER (OUTPATIENT)
Dept: RADIOLOGY | Facility: HOSPITAL | Age: 68
Discharge: HOME | End: 2018-08-10
Attending: NURSE PRACTITIONER
Payer: MEDICARE

## 2018-08-10 VITALS
BODY MASS INDEX: 26.75 KG/M2 | TEMPERATURE: 98 F | RESPIRATION RATE: 17 BRPM | WEIGHT: 151 LBS | SYSTOLIC BLOOD PRESSURE: 158 MMHG | HEIGHT: 63 IN | OXYGEN SATURATION: 99 % | DIASTOLIC BLOOD PRESSURE: 84 MMHG | HEART RATE: 82 BPM

## 2018-08-10 DIAGNOSIS — Z76.82 ORGAN TRANSPLANT CANDIDATE: ICD-10-CM

## 2018-08-10 DIAGNOSIS — I10 ESSENTIAL HYPERTENSION: Chronic | ICD-10-CM

## 2018-08-10 DIAGNOSIS — Z76.82 PATIENT ON WAITING LIST FOR KIDNEY TRANSPLANT: Primary | Chronic | ICD-10-CM

## 2018-08-10 DIAGNOSIS — Z76.82 ORGAN TRANSPLANT CANDIDATE: Primary | ICD-10-CM

## 2018-08-10 DIAGNOSIS — Z99.2 ESRD ON PERITONEAL DIALYSIS: Chronic | ICD-10-CM

## 2018-08-10 DIAGNOSIS — N18.6 ESRD ON PERITONEAL DIALYSIS: Chronic | ICD-10-CM

## 2018-08-10 DIAGNOSIS — Z79.01 CURRENT USE OF LONG TERM ANTICOAGULATION: Chronic | ICD-10-CM

## 2018-08-10 DIAGNOSIS — I63.9 STROKE OF UNKNOWN ETIOLOGY: ICD-10-CM

## 2018-08-10 PROCEDURE — 76770 US EXAM ABDO BACK WALL COMP: CPT | Mod: 26,TXP,, | Performed by: RADIOLOGY

## 2018-08-10 PROCEDURE — 76770 US EXAM ABDO BACK WALL COMP: CPT | Mod: TC,TXP

## 2018-08-10 PROCEDURE — 99999 PR PBB SHADOW E&M-EST. PATIENT-LVL III: CPT | Mod: PBBFAC,TXP,, | Performed by: INTERNAL MEDICINE

## 2018-08-10 PROCEDURE — 93978 VASCULAR STUDY: CPT | Mod: 26,TXP,, | Performed by: RADIOLOGY

## 2018-08-10 PROCEDURE — 93978 VASCULAR STUDY: CPT | Mod: TC,TXP

## 2018-08-10 PROCEDURE — 99215 OFFICE O/P EST HI 40 MIN: CPT | Mod: S$GLB,TXP,, | Performed by: INTERNAL MEDICINE

## 2018-08-10 RX ORDER — CETIRIZINE HYDROCHLORIDE 10 MG/1
10 TABLET ORAL DAILY
COMMUNITY

## 2018-08-10 NOTE — PROGRESS NOTES
KIDNEY, KIDNEY/PANCREAS, PANCREAS TRANSPLANT RECIPIENT REEVALUATION    Requesting Physician: Dr. Pura SANFORD     CC:   Six monthly/Annual reassessment of kidney transplant candidacy.    HPI:  Ms. Benton is a 68 y.o. 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  She has been on the wait list for a kidney transplant at [facility] since 9/27/2016. Patient is currently on peritoneal dialysis started on 9/27/2016. Patient is dialyzing on cyclic peritoneal dialysis.  Patient reports that she is tolerating dialysis well. . She has a PD catheter.     - History of stroke, last stroke was in 2016 on plavix  - Seizure, last episode was in 2016  - No recent peritonitis i last 6 months  - Patient denies any recent hospitalizations or ED visits    Patient denies any recent history of coronary artery disease, stroke, seizure disorder, chronic obstructive pulmonary disease,  deep venous thrombosis, pulmonary embolism, or malignancies. Patient states that he is doing well. she denies any CP, SOB,   nausea, vomiting, diarrhea, abdominal pain, cough, fever, chills, weight loss or night sweats.  she has no focal weakness.         Functional Status: she exercises and walks 2 miles without any symptoms of chest pain, SOB, claudication.   Previous Transplant: no  Previous Blood Transfusion: no  Previous Pregnancy: 2  Anticoagulation/ antiplatelet therapy and reason:  plavix  Potential Donor: no  High KDPI candidate: no, on plavix            Past Medical History:  Past Medical History:   Diagnosis Date    Anemia of renal disease     ESRD on peritoneal dialysis     Essential hypertension     Hyperlipidemia     Hypothyroidism     Stroke x2 in 2014 and 2015     Type 2 diabetes mellitus with diabetic nephropathy     Vitamin D deficiency        Past Surgical History:  Past Surgical History:   Procedure Laterality Date    BREAST BIOPSY Right     benign per patient    HYSTERECTOMY  in the 1990s    per  patient for abnormal bleeding    OOPHORECTOMY Bilateral in the 1990s    PERITONEAL CATHETER INSERTION  08/05/2016         Family History:  Family History   Problem Relation Age of Onset    Cancer Mother         passed away at 68 from cancer of GI tract    Cancer Father         passed away in his 80s from cancer of GI tract    Colon cancer Sister     Diabetes Mellitus Sister     Kidney cancer Neg Hx     Heart attack Neg Hx     Stroke Neg Hx        Social History:  Social History     Social History    Marital status: Single     Spouse name: N/A    Number of children: N/A    Years of education: N/A     Occupational History    Not on file.     Social History Main Topics    Smoking status: Former Smoker    Smokeless tobacco: Never Used      Comment: smoked for ~6 months in 1968    Alcohol use No      Comment: previous social drinker    Drug use: No    Sexual activity: No     Other Topics Concern    Not on file     Social History Narrative    Lives alone     No pets                Current Medication  Current Outpatient Prescriptions   Medication Sig Dispense Refill    aspirin (ECOTRIN) 81 MG EC tablet Take 81 mg by mouth once daily.      cetirizine (ZYRTEC) 10 MG tablet Take 10 mg by mouth once daily.      clopidogrel (PLAVIX) 75 mg tablet Take 75 mg by mouth once daily.      docusate sodium (COLACE) 100 MG capsule Take 100 mg by mouth 2 (two) times daily.      levetiracetam (KEPPRA) 250 MG Tab Take 250 mg by mouth 2 (two) times daily.      levocetirizine (XYZAL) 5 MG tablet Take 5 mg by mouth nightly as needed for Allergies.      levothyroxine (SYNTHROID) 100 MCG tablet Take 100 mcg by mouth once daily.      montelukast (SINGULAIR) 10 mg tablet Take 10 mg by mouth once daily.      rosuvastatin (CRESTOR) 20 MG tablet Take 20 mg by mouth once daily.      sevelamer carbonate (RENVELA) 800 mg Tab Take 800 mg by mouth 3 (three) times daily with meals. Patient states that she takes medication when  she eats a meal high in phosphorus.      vitamin D 1000 units Tab Take 5,000 Units by mouth once daily.       No current facility-administered medications for this visit.              Review of Systems    Constitutional: Denies fever/chills, fatigue, night sweats  EENT: +wears eye glasses; Denies vision problems, hearing problems, trouble swallowing.   Respiratory: Denies shortness of breath, dyspnea on exertion, orthopnea, wheezing, hemoptysis, denies known TB exposure or history of positive TB skin test  Cardiovascular: +history of stroke as mentioned above; Denies chest pain, palpitations, history of MI, history of DVT  Gastrointestinal: +constipation secondary to PD catheter; Denies abdominal pain, nausea/vomiting/diarrhea. Denies history of GI bleeding or ulcers.   Genitourinary: Denies history of kidney stones, recurrent UTI's, history of urinary obstruction, hematuria, dysuria, urinary frequency, incontinence  Musculoskeletal: +arthritis   Skin: Denies history of skin cancer, denies rash, ulcerations  Heme/onc: +bruises easily; Denies any history of cancer  Neurological: +seizures as mentioned above; +sinus headaches;  Psychiatric: Denies anxiety, depression. Denies hallucinations or delusions.          OBJECTIVE       Body mass index is 27.18 kg/m².    Vitals:    08/10/18 1209   BP: (!) 158/84   Pulse: 82   Resp: 17   Temp: 97.7 °F (36.5 °C)       Physical Exam  General: No acute distress, well groomed, alert and oriented x 3  Neck: Supple, symmetrical, trachea midline, no masses, no carotid bruits, no JVD  Respiratory: Clear to auscultation bilaterally, respirations unlabored, no rales/rhonchi/wheezing   Cardiovacular: Regular rate and rhythm, S1, S2 normal, no murmurs, no rubs or gallops   Gastrointestinal: Soft, non-tender, bowel sounds normal, PD cath in place  Extremities: No clubbing or cyanosis of upper extremities bilaterally, no pedal edema bilaterally; +2 bilateral radial pulses  Skin: warm and  dry; no rash on exposed skin  Lymph nodes: Cervical and supraclavicular nodes normal   Neurologic: No focal neurologic deficits.   Musculoskeletal: moves all extremities without difficulty, FROM, 4+/5 strength in bilateral lower extremities   Psychiatric: Normal mood and affect. Responds appropriately to questions.            Labs:  Reviewed with the patient      ASSESSMENT     1. Patient on waiting list for kidney transplant    2. Current use of long term anticoagulation--Plavix    3. ESRD on peritoneal dialysis    4. Essential hypertension    5. Stroke x3 in 2014 and 2015        PLAN       Transplant Candidacy:    Ms. Benton is a a suitable for kidney transplantation. She remains in overall stable health, and will remain active on the transplant list.

## 2018-08-10 NOTE — LETTER
Date: 8/13/2018          Listed Patient      To: Dialysis Unit  and Charge RN From: DARREN Olsen   RE: Britt Benton, 1950, [id number]     At [facility], we conduct adherence checks as an important part of transplant care. Initial and listed patient assessments are not complete without adherence information.        Please complete the following information:     Current Dry Weight: ___________         Most Recent Pre-Treatment Weight: ___________ /date: _________                    Data from the last 3 months:  (data from last 3 months preferred):    Number of AMAs with dates, time, and reasons: ____________________________________________________    ______________________________________________________________________________________________    ______________________________________________________________________________________________    Number of No-Shows with dates and reasons: ______________________________________________________      ______________________________________________________________________________________________    Last intact PTH:  ___________/date: __________      Any concerns with Labs:  YES / NO      If yes, please explain:  ___________________________________________________________________________    ______________________________________________________________________________________________    Any concerns with Caregivers:  YES / NO    If yes, please explain:  ___________________________________________________________________________    ______________________________________________________________________________________________     Any concerns with Transportation:  YES / NO    If yes, please explain:  ___________________________________________________________________________    ______________________________________________________________________________________________    Any Psychiatric and/or Psychosocial concerns:  YES /  NO     If yes, please explain: ___________________________________________________________________________    ______________________________________________________________________________________________      PLEASE RETURN TO: FAX: [phone]     Thank you for collaborating with us in the care of this patient.           [address]  ?  [place] [address]  ?  phone [phone]  ?  fax [phone]  ?  [email or web address]  Confidentiality notice: The accompanying facsimile is intended solely for the use of the recipient designated above. Document(s) transmitted herewith may contain information that is confidential and privileged. Delivery, distribution or dissemination of this communication other than to the intended recipient is strictly prohibited. If you have received this facsimile in error, please notify us immediately by telephone.

## 2018-08-10 NOTE — LETTER
August 13, 2018        Al Neves  [address]  [place] [address]  Phone: [phone]  Fax: [phone]             [facility]  [address]  [facility] [address]  Phone: [phone]   Patient: Britt Benton   MR Number: [id number]   YOB: 1950   Date of Visit: 8/10/2018       Dear Dr. Al Neves    Thank you for referring Britt Benton to me for evaluation. Attached you will find relevant portions of my assessment and plan of care.    If you have questions, please do not hesitate to call me. I look forward to following Britt Benton along with you.    Sincerely,    Jojo Julien MD    Enclosure    If you would like to receive this communication electronically, please contact [email or web address] or [phone] to request Localize Direct Link access.    Localize Direct Link is a tool which provides read-only access to select patient information with whom you have a relationship. Its easy to use and provides real time access to review your patients record including encounter summaries, notes, results, and demographic information.    If you feel you have received this communication in error or would no longer like to receive these types of communications, please e-mail [email or web address]

## 2018-08-13 NOTE — PROGRESS NOTES
"Transplant Recipient Adult Psychosocial Assessment (UPDATE)    Britt Benton  [address]  [facility] [address]    Telephone Information:   Mobile [phone]   Home  [phone] (home)  Work  There is no work phone number on file.  E-mail  [email or web address]    Sex: female  YOB: 1950  Age: 68 y.o.    Encounter Date: 8/10/2018  [place] Citizen: yes  Primary Language: English   Needed: no    Emergency Contact:  Name: Kali Luna (pronounced "East")  Relationship: friend (Pt and Kali report that they've been friends since they were 10 yo)  Address: [address] [place] [address]  Phone Numbers:  [phone] (home), [phone] (mobile)    Family/Social Support:   Number of dependents/: Pt reports no dependents.  Marital history: Pt reports 1 previous marriage which ended in divorce. Pt reports no current significant other.  Other family dynamics: Pt presents with close friends: Kali and Lana Luna; sister: Esmer; and 2 adult children: Daniel and Joe whom pt  Identifies as supportive. Pt also reports 3 brothers: Robert, Eric, and Carlos.    Household Composition:  Name: Britt Benton  Age: 68  Relationship: patient  Does person drive? yes    Do you and your caregivers have access to reliable transportation? yes     PRIMARY CAREGIVER: Orin Luna (friend) will be primary caregiver, phone number [phone]     provided in-depth information to patient and caregiver regarding pre- and post-transplant caregiver role.   strongly encourages patient and caregiver to have concrete plan regarding post-transplant care giving, including back-up caregiver(s) to ensure care giving needs are met as needed.    Patient and Caregiver states understanding all aspects of caregiver role/commitment and is able/willing/committed to being caregiver to the fullest extent necessary.    Patient and Caregiver " "verbalizes understanding of the education provided today and caregiver responsibilities.         remains available. Patient and Caregiver agree to contact  in a timely manner if concerns arise.      Able to take time off work without financial concerns: yes.     Additional Significant Others who will Assist with Transplant:  Name: Esmer Adams  Age: 86  Phone: [phone]  City: [place]: [place]  Relationship: sister  Does person drive? yes    Living Will: no Education and information provided to pt. Pt reports that she has already discussed her end-of-life desires with her sons.  Daniel Benton ([phone])  Healthcare Power of : no Education and information provided to pt. Pt reports that she has already discussed her end-of-life desires with her sons.  Daniel Benton ([phone])  Advance Directives on file: <<no information> per medical record.  Verbally reviewed LW/HCPA information.   provided patient with copy of LW/HCPA documents and provided education on completion of forms.    Living Donors: No. Education and resource information given to patient.    Highest Education Level: High School (9-12) or GED  Reading Ability: college  Reports difficulty with: seeing and wears bifocals  Learns Best By:  Pt reports pt learns best by a combination of verbal, written, and tactile instructions.     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    Spouse/Significant Other Employment: Pt reports no current significant other.    Disabled: no    Monthly Income:  skilled nursing: $1817  SSI: $161     Able to afford all costs now and if transplanted, including medications: yes. Pt reports that she continues to "hardly have any bills", budgets well, and reports family and friends will assist if necessary.  Patient and Caregiver " verbalizes understanding of personal responsibilities related to transplant costs and the importance of having a financial plan to ensure that patients transplant costs are fully covered.       provided fundraising information/education. Patient and Caregiververbalizes understanding.   remains available.    Insurance:   Payor/Plan Subscr  Sex Relation Sub. Ins. ID Effective Group Num   1. [employer] MANAGE* KATIE KNOX 1950 Female  [id number] 1/1/15 [id number]                                   [address] [address]     Primary Insurance (for UNOS reporting): Public Insurance - Medicare & Choice  Secondary Insurance (for UNOS reporting): None  Patient and Caregiver verbalizes clear understanding that patient may experience difficulty obtaining and/or be denied insurance coverage post-surgery. This includes and is not limited to disability insurance, life insurance, health insurance, burial insurance, long term care insurance, and other insurances.      Patient and Caregiver also reports understanding that future health concerns related to or unrelated to transplantation may not be covered by patient's insurance.  Resources and information provided and reviewed.     Patient and Caregiver provides verbal permission to release any necessary information to outside resources for patient care and discharge planning.  Resources and information provided are reviewed.      Dialysis Adherence: Patient reports being on peritoneal dialysis attending all dialysis appointments and completing the entire course of treatment. SW faxed an adherence form (see Letters section) and is awaiting a fax back.    Infusion Service: patient utilizing? no  Home Health: patient utilizing? no Pt reports using HH in Aug. 2016 after her surgery. Pt reports they came out for wound care for 3-4 days.  DME: yes PD equipment, BP Cuff, and Glucometer  Pulmonary/Cardiac Rehab: Pt denies   ADLS:  Pt reports no difficulties with  "driving, walking, bathing, cooking, housekeeping, eating, shopping, and taking medication.    Adherence:   Pt reports good adherence with medications, dialysis, and health regimen.  Adherence education and counseling provided.     Per History Section:  Past Medical History:   Diagnosis Date    Anemia of renal disease     ESRD on peritoneal dialysis     Essential hypertension     Hyperlipidemia     Hypothyroidism     Stroke x2 in 2014 and 2015     Type 2 diabetes mellitus with diabetic nephropathy     Vitamin D deficiency      Social History     Tobacco Use    Smoking status: Former Smoker    Smokeless tobacco: Never Used    Tobacco comment: smoked for ~6 months in 1968   Substance Use Topics    Alcohol use: No     Comment: previous social drinker     Social History     Substance and Sexual Activity   Drug Use No     Social History     Substance and Sexual Activity   Sexual Activity No       Per Today's Psychosocial:  Tobacco: Pt reports smoking for about 6 months in 1968, however reports that "The man I  made me quit".  Alcohol: Pt reports remote history of social ETOH use and reports no current use.  Illicit Drugs/Non-prescribed Medications: none, patient denies any use.    Patient and Caregiver states clear understanding of the potential impact of substance use as it relates to transplant candidacy and is aware of possible random substance screening.  Substance abstinence/cessation counseling, education and resources provided and reviewed.     Arrests/DWI/Treatment/Rehab: patient denies    Psychiatric History:    Mental Health: Pt reports no history of or current mental health issues or concerns.   Psychiatrist/Counselor: Pt denies seeing a mental health professional and reports being open to seeing the psych department for talk therapy if necessary.  Medications:  Pt denies taking medications for mental health reasons. Pt does report that she takes Keppra for seizures.  Suicide/Homicide Issues: " Pt denies any history of or current suicidal or homicidal ideations.    Safety at home: Pt reports no current or history of safety concerns in household.    Knowledge: Patient and Caregiver states having clear understanding and realistic expectations regarding the potential risks and potential benefits of organ transplantation and organ donation and agrees to discuss with health care team members and support system members, as well as to utilize available resources and express questions and/or concerns in order to further facilitate the pt informed decision-making.  Resources and information provided and reviewed.    Patient and Caregiver is aware of [facility] affiliation and/or partnership with agencies in home health care, LTAC, SNF, [facility], and other hospitals and clinics.    Understanding: Patient and Caregiver reports having a clear understanding of the many lifetime commitments involved with being a transplant recipient, including costs, compliance, medications, lab work, procedures, appointments, concrete and financial planning, preparedness, timely and appropriate communication of concerns, abstinence (ETOH, tobacco, illicit non-prescribed drugs), adherence to all health care team recommendations, support system and caregiver involvement, appropriate and timely resource utilization and follow-through, mental health counseling as needed/recommended, and patient and caregiver responsibilities.  Social Service Handbook, resources and detailed educational information provided and reviewed.  Educational information provided.    Patient and Caregiver also reports current and expected compliance with health care regime and states having a clear understanding of the importance of compliance.      Patient and Caregiver reports a clear understanding that risks and benefits may be involved with organ transplantation and with organ donation.       Patient and Caregiver also reports clear understanding that psychosocial  risk factors may affect patient, and include but are not limited to feelings of depression, generalized anxiety, anxiety regarding dependence on others, post traumatic stress disorder, feelings of guilt and other emotional and/or mental concerns, and/or exacerbation of existing mental health concerns.  Detailed resources provided and discussed.      Patient and Caregiver agrees to access appropriate resources in a timely manner as needed and/or as recommended, and to communicate concerns appropriately.  Patient and Caregiver also reports a clear understanding of treatment options available.     Patient and Caregiver received education in a group setting.   reviewed education, provided additional information, and answered questions.    Feelings or Concerns: Patient and Caregiver did not express any concerns at this time in regards to kidney transplant. Pt did express dissatisfaction that she had just learned about her kidney disease and reports that if her doctor would have said something earlier, it could have been prevented.    Coping: Pt reports coping well with the transplant process at this time and reports sitting at home and spending time with friends as ways to cope. Pt reports [facility] home as [facility] in [place], [place] with Jerel Cintron presiding.     Goals: Pt reports staying healthy, traveling with friends, and getting off dialysis as goals for after transplant.    Interview Behavior: Patient and Caregiver presents as alert and oriented x 4, pleasant, good eye contact, well groomed, recall good, concentration/judgement good, average intelligence, calm, communicative, cooperative and asking and answering questions appropriately. Pt presents with friend: Kali Luna at pt's request.         Transplant Social Work - Candidacy  Assessment/Plan:     Psychosocial Suitability: Patient presents as a suitable candidate for kidney transplant at this time.Based on  psychosocial risk factors, patient presents as low risk, due to adequate caregiver plan, insurance, and financial plan in place.     Recommendations/Additional Comments: SW recommends that pt conduct fundraising to assist pt with pay for cost of medications, food, gas, and other transplant related needs. SW recommends that pt remain aware of potential mental health concerns and contact the team if any concerns arise. SW recommends that pt remain abstinent from tobacco, ETOH, and drug use. SW remains available to answer any questions or concerns that arise as the pt moves through the transplant process.     Maicol Jean-Baptiste, MSW, LMSW

## 2018-08-13 NOTE — PROGRESS NOTES
LISTED PATIENT EDUCATION NOTE    Ms. Britt Benton was seen in LISTED kidney transplant clinic for evaluation for kidney, kidney/pancreas or pancreas only transplant.  The patient attended a group education session that discussed/reviewed the following aspects of transplantation: evaluation and selection committee process, UNOS waitlist management/multiple listings, types of organs offered (KDPI < 85%, KDPI > 85%, PHS increased risk, DCD), financial aspects, surgical procedures, dietary instruction pre- and post-transplant, health maintenance pre- and post-transplant, post-transplant hospitalization and outpatient follow-up, potential to participate in a research protocol, and medication management and side effects.  A question and answer session was provided after the presentation.    The patient was seen by all members of the multi-disciplinary team to include: Nephrologist/PA, Surgeon, , Transplant Coordinator, , Pharmacist and Dietician (if applicable).    The patient reviewed and signed all consents for evaluation which were witnessed and sent to scanning into the EPIC chart.    The patient was given an education book and plan for further evaluation based on her individual assessment.      The patient was encouraged to call with any questions or concerns.

## 2018-08-15 NOTE — PROGRESS NOTES
Patient's chart reviewed today. Patient due for follow-up in February 2019. Appointments will be scheduled per protocol. HLA sample current, in lab, and being received on a regular basis.

## 2018-09-05 ENCOUNTER — TELEPHONE (OUTPATIENT)
Dept: RADIOLOGY | Facility: HOSPITAL | Age: 68
End: 2018-09-05

## 2018-09-07 ENCOUNTER — HOSPITAL ENCOUNTER (OUTPATIENT)
Dept: RADIOLOGY | Facility: HOSPITAL | Age: 68
Discharge: HOME | End: 2018-09-07
Attending: TRANSPLANT SURGERY
Payer: MEDICARE

## 2018-09-07 DIAGNOSIS — Z76.82 ORGAN TRANSPLANT CANDIDATE: ICD-10-CM

## 2018-09-07 PROCEDURE — 74176 CT ABD & PELVIS W/O CONTRAST: CPT | Mod: TC,PO,TXP

## 2018-09-07 PROCEDURE — 74176 CT ABD & PELVIS W/O CONTRAST: CPT | Mod: 26,TXP,, | Performed by: RADIOLOGY

## 2018-10-10 DIAGNOSIS — Z76.82 ORGAN TRANSPLANT CANDIDATE: ICD-10-CM

## 2019-01-31 DIAGNOSIS — Z76.82 ORGAN TRANSPLANT CANDIDATE: Primary | ICD-10-CM

## 2019-02-21 ENCOUNTER — OFFICE VISIT (OUTPATIENT)
Dept: TRANSPLANT | Facility: CLINIC | Age: 69
End: 2019-02-21
Payer: MEDICARE

## 2019-02-21 ENCOUNTER — HOSPITAL ENCOUNTER (OUTPATIENT)
Dept: RADIOLOGY | Facility: HOSPITAL | Age: 69
Discharge: HOME | End: 2019-02-21
Attending: TRANSPLANT SURGERY
Payer: MEDICARE

## 2019-02-21 ENCOUNTER — HOSPITAL ENCOUNTER (OUTPATIENT)
Dept: CARDIOLOGY | Facility: CLINIC | Age: 69
Discharge: HOME | End: 2019-02-21
Attending: NURSE PRACTITIONER
Payer: MEDICARE

## 2019-02-21 VITALS
SYSTOLIC BLOOD PRESSURE: 181 MMHG | BODY MASS INDEX: 28.52 KG/M2 | DIASTOLIC BLOOD PRESSURE: 88 MMHG | RESPIRATION RATE: 16 BRPM | OXYGEN SATURATION: 99 % | TEMPERATURE: 98 F | HEIGHT: 62 IN | WEIGHT: 155 LBS | HEART RATE: 92 BPM

## 2019-02-21 VITALS
HEIGHT: 63 IN | BODY MASS INDEX: 26.22 KG/M2 | DIASTOLIC BLOOD PRESSURE: 88 MMHG | SYSTOLIC BLOOD PRESSURE: 180 MMHG | HEART RATE: 75 BPM | WEIGHT: 148 LBS

## 2019-02-21 DIAGNOSIS — Z99.2 ESRD ON PERITONEAL DIALYSIS: Primary | Chronic | ICD-10-CM

## 2019-02-21 DIAGNOSIS — N18.6 ESRD ON PERITONEAL DIALYSIS: Primary | Chronic | ICD-10-CM

## 2019-02-21 DIAGNOSIS — E55.9 VITAMIN D DEFICIENCY: Chronic | ICD-10-CM

## 2019-02-21 DIAGNOSIS — N18.9 ANEMIA OF RENAL DISEASE: Chronic | ICD-10-CM

## 2019-02-21 DIAGNOSIS — E03.9 HYPOTHYROIDISM, UNSPECIFIED TYPE: Chronic | ICD-10-CM

## 2019-02-21 DIAGNOSIS — Z76.82 ORGAN TRANSPLANT CANDIDATE: ICD-10-CM

## 2019-02-21 DIAGNOSIS — Z76.82 AWAITING ORGAN TRANSPLANT STATUS: ICD-10-CM

## 2019-02-21 DIAGNOSIS — Z76.82 PATIENT ON WAITING LIST FOR KIDNEY TRANSPLANT: Chronic | ICD-10-CM

## 2019-02-21 DIAGNOSIS — E11.21 TYPE 2 DIABETES MELLITUS WITH DIABETIC NEPHROPATHY, UNSPECIFIED WHETHER LONG TERM INSULIN USE: Chronic | ICD-10-CM

## 2019-02-21 DIAGNOSIS — I63.9 STROKE OF UNKNOWN ETIOLOGY: ICD-10-CM

## 2019-02-21 DIAGNOSIS — Z79.01 CURRENT USE OF LONG TERM ANTICOAGULATION: Chronic | ICD-10-CM

## 2019-02-21 DIAGNOSIS — D63.1 ANEMIA OF RENAL DISEASE: Chronic | ICD-10-CM

## 2019-02-21 DIAGNOSIS — E78.5 HYPERLIPIDEMIA, UNSPECIFIED HYPERLIPIDEMIA TYPE: Chronic | ICD-10-CM

## 2019-02-21 DIAGNOSIS — I10 ESSENTIAL HYPERTENSION: Chronic | ICD-10-CM

## 2019-02-21 LAB
ASCENDING AORTA: 2.44 CM
AV INDEX (PROSTH): 0.86
AV MEAN GRADIENT: 3.73 MMHG
AV PEAK GRADIENT: 8.18 MMHG
AV VALVE AREA: 2.46 CM2
AV VELOCITY RATIO: 0.81
BSA FOR ECHO PROCEDURE: 1.73 M2
CV ECHO LV RWT: 0.44 CM
DOP CALC AO PEAK VEL: 1.43 M/S
DOP CALC AO VTI: 23.12 CM
DOP CALC LVOT AREA: 2.86 CM2
DOP CALC LVOT DIAMETER: 1.91 CM
DOP CALC LVOT PEAK VEL: 1.15 M/S
DOP CALC LVOT STROKE VOLUME: 56.82 CM3
DOP CALCLVOT PEAK VEL VTI: 19.84 CM
E WAVE DECELERATION TIME: 168.86 MSEC
E/A RATIO: 1.02
E/E' RATIO: 11.86
ECHO LV POSTERIOR WALL: 1 CM (ref 0.6–1.1)
FRACTIONAL SHORTENING: 36 % (ref 28–44)
INTERVENTRICULAR SEPTUM: 1 CM (ref 0.6–1.1)
IVRT: 0.08 MSEC
LA MAJOR: 4.92 CM
LA MINOR: 4.99 CM
LA WIDTH: 3.59 CM
LEFT ATRIUM SIZE: 3.8 CM
LEFT ATRIUM VOLUME INDEX: 33.8 ML/M2
LEFT ATRIUM VOLUME: 57.45 CM3
LEFT INTERNAL DIMENSION IN SYSTOLE: 2.91 CM (ref 2.1–4)
LEFT VENTRICLE DIASTOLIC VOLUME INDEX: 55.84 ML/M2
LEFT VENTRICLE DIASTOLIC VOLUME: 95.02 ML
LEFT VENTRICLE MASS INDEX: 91.7 G/M2
LEFT VENTRICLE SYSTOLIC VOLUME INDEX: 19 ML/M2
LEFT VENTRICLE SYSTOLIC VOLUME: 32.39 ML
LEFT VENTRICULAR INTERNAL DIMENSION IN DIASTOLE: 4.55 CM (ref 3.5–6)
LEFT VENTRICULAR MASS: 156.03 G
LV LATERAL E/E' RATIO: 9.22
LV SEPTAL E/E' RATIO: 16.6
MV PEAK A VEL: 0.81 M/S
MV PEAK E VEL: 0.83 M/S
PULM VEIN S/D RATIO: 1.7
PV PEAK D VEL: 0.4 M/S
PV PEAK S VEL: 0.68 M/S
RA MAJOR: 4.09 CM
RA PRESSURE: 3 MMHG
RA WIDTH: 3.29 CM
RIGHT VENTRICULAR END-DIASTOLIC DIMENSION: 3.49 CM
RV TISSUE DOPPLER FREE WALL SYSTOLIC VELOCITY 1 (APICAL 4 CHAMBER VIEW): 12.95 M/S
SINUS: 2.63 CM
STJ: 2.02 CM
TDI LATERAL: 0.09
TDI SEPTAL: 0.05
TDI: 0.07
TRICUSPID ANNULAR PLANE SYSTOLIC EXCURSION: 1.72 CM

## 2019-02-21 PROCEDURE — 93306 TTE W/DOPPLER COMPLETE: CPT | Mod: S$GLB,TXP,, | Performed by: INTERNAL MEDICINE

## 2019-02-21 PROCEDURE — 1101F PT FALLS ASSESS-DOCD LE1/YR: CPT | Mod: CPTII,S$GLB,TXP, | Performed by: INTERNAL MEDICINE

## 2019-02-21 PROCEDURE — 99999 PR PBB SHADOW E&M-EST. PATIENT-LVL IV: ICD-10-PCS | Mod: PBBFAC,TXP,, | Performed by: INTERNAL MEDICINE

## 2019-02-21 PROCEDURE — 1101F PR PT FALLS ASSESS DOC 0-1 FALLS W/OUT INJ PAST YR: ICD-10-PCS | Mod: CPTII,S$GLB,TXP, | Performed by: INTERNAL MEDICINE

## 2019-02-21 PROCEDURE — 72170 X-RAY EXAM OF PELVIS: CPT | Mod: 26,TXP,, | Performed by: RADIOLOGY

## 2019-02-21 PROCEDURE — 93306 TRANSTHORACIC ECHO (TTE) COMPLETE (CUPID ONLY): ICD-10-PCS | Mod: S$GLB,TXP,, | Performed by: INTERNAL MEDICINE

## 2019-02-21 PROCEDURE — 99215 PR OFFICE/OUTPT VISIT, EST, LEVL V, 40-54 MIN: ICD-10-PCS | Mod: S$GLB,TXP,, | Performed by: INTERNAL MEDICINE

## 2019-02-21 PROCEDURE — 99215 OFFICE O/P EST HI 40 MIN: CPT | Mod: S$GLB,TXP,, | Performed by: INTERNAL MEDICINE

## 2019-02-21 PROCEDURE — 72170 X-RAY EXAM OF PELVIS: CPT | Mod: TC,TXP

## 2019-02-21 PROCEDURE — 99999 PR PBB SHADOW E&M-EST. PATIENT-LVL IV: CPT | Mod: PBBFAC,TXP,, | Performed by: INTERNAL MEDICINE

## 2019-02-21 PROCEDURE — 72170 XR PELVIS ROUTINE AP: ICD-10-PCS | Mod: 26,TXP,, | Performed by: RADIOLOGY

## 2019-02-21 NOTE — PROGRESS NOTES
"Transplant Recipient Adult Psychosocial Assessment (UPDATE)    Britt Benton  [address]  [facility] [address]  Telephone Information:   Mobile [phone]   Home  [phone] (home)  Work  There is no work phone number on file.  E-mail  [email or web address]    Sex: female  YOB: 1950  Age: 69 y.o.    Encounter Date: 2/21/2019  [place] Citizen: yes  Primary Language: English   Needed: no    Emergency Contact:  Name: Kali Luna (pronounced "East")  Relationship: friend (Pt and Kali report that they've been friends since they were 10 yo)  Address: [address] [place] [address]  Phone Numbers:  [phone] (home), [phone] (mobile)    Family/Social Support:   Number of dependents/: Pt reports no dependents.  Marital history: Pt reports 1 previous marriage which ended in divorce. Pt reports no current significant other.  Other family dynamics: Pt presents with close friends: Kali and Lana Juhifitz Luna; sister: Esmer; and 2 adult children: Daniel and Joe whom pt  Identifies as supportive. Pt also reports 3 brothers: Robert, Eric, and Carlos.    Household Composition:  Name: Britt Benton  Age: 69  Relationship: patient  Does person drive? yes    Do you and your caregivers have access to reliable transportation? yes     PRIMARY CAREGIVER: Kali and Juhi Luna (friend) will be primary caregiver, phone number [phone]. Pt reports that after transplant, she plans on staying at the . She has contacted her insurance company and they offer up to 10k for housing during transplant. Pt confirms understanding that she may have to pay for it up front and then be reimbursed. Pt confirms multiple times that she plans on doing that and can afford to put the money up front.      provided in-depth information to patient and caregiver regarding pre- and post-transplant caregiver role.   strongly encourages " patient and caregiver to have concrete plan regarding post-transplant care giving, including back-up caregiver(s) to ensure care giving needs are met as needed.    Patient and Caregiver states understanding all aspects of caregiver role/commitment and is able/willing/committed to being caregiver to the fullest extent necessary.    Patient and Caregiver verbalizes understanding of the education provided today and caregiver responsibilities.         remains available. Patient and Caregiver agree to contact  in a timely manner if concerns arise.      Able to take time off work without financial concerns: yes.     Additional Significant Others who will Assist with Transplant:  Name: Esmer Adams  Age: 86  Phone: [phone]  City: [place] State: [place]  Relationship: sister  Does person drive? yes    Living Will: no Education and information provided to pt. Pt reports that she has already discussed her end-of-life desires with her sons.  Daniel Benton ([phone])  Healthcare Power of : no Education and information provided to pt. Pt reports that she has already discussed her end-of-life desires with her sons.  Daniel Benton ([phone])  Advance Directives on file: <<no information> per medical record.  Verbally reviewed LW/HCPA information.   provided patient with copy of LW/HCPA documents and provided education on completion of forms.    Living Donors: No. Education and resource information given to patient.    Highest Education Level: High School (9-12) or GED  Reading Ability: college  Reports difficulty with: seeing and wears bifocals  Learns Best By:  Pt reports pt learns best by a combination of verbal, written, and tactile instructions.     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    Spouse/Significant  "Other Employment: Pt reports no current significant other.    Disabled: no    Monthly Income:  USP: $1817  SSI: $161     Able to afford all costs now and if transplanted, including medications: yes. Pt reports that she continues to "hardly have any bills", budgets well, and reports family and friends will assist if necessary. Pt reports that she has contacted her insurance company and knows that her [employer] could be 1k a month. Pt reports that she has not conducted any fundraising, but has been putting money away. SW counseled pt on how important it is to be able to afford medications and pt reports that her family would be willing to help. Pt reports she is interested in fund raising.  Patient and Caregiver verbalizes understanding of personal responsibilities related to transplant costs and the importance of having a financial plan to ensure that patients transplant costs are fully covered.       provided fundraising information/education. Patient and Caregiververbalizes understanding.   remains available.    Insurance:   Payor/Plan Subscr  Sex Relation Sub. Ins. ID Effective Group Num   1. [employer] MANAGE* KATIE KNOX 1950 Female  [id number] 1/1/15 [id number]                                   [address] [address]     Primary Insurance (for UNOS reporting): Public Insurance - Medicare & Choice  Secondary Insurance (for UNOS reporting): None  Patient and Caregiver verbalizes clear understanding that patient may experience difficulty obtaining and/or be denied insurance coverage post-surgery. This includes and is not limited to disability insurance, life insurance, health insurance, burial insurance, long term care insurance, and other insurances.      Patient and Caregiver also reports understanding that future health concerns related to or unrelated to transplantation may not be covered by patient's insurance.  Resources and information provided and reviewed.     Patient and " "Caregiver provides verbal permission to release any necessary information to outside resources for patient care and discharge planning.  Resources and information provided are reviewed.      Dialysis Adherence: Patient reports being on peritoneal dialysis attending all dialysis appointments and completing the entire course of treatment. SW faxed an adherence form (see Letters section) and is awaiting a fax back.    Infusion Service: patient utilizing? no  Home Health: patient utilizing? no Pt reports using HH in Aug. 2016 after her surgery. Pt reports they came out for wound care for 3-4 days.  DME: yes PD equipment, BP Cuff, and Glucometer  Pulmonary/Cardiac Rehab: Pt denies   ADLS:  Pt reports no difficulties with driving, walking, bathing, cooking, housekeeping, eating, shopping, and taking medication.    Adherence:   Pt reports good adherence with medications, dialysis, and health regimen.  Adherence education and counseling provided.     Per History Section:  Past Medical History:   Diagnosis Date    Anemia of renal disease     ESRD on peritoneal dialysis     Essential hypertension     Hyperlipidemia     Hypothyroidism     Stroke x2 in 2014 and 2015     Type 2 diabetes mellitus with diabetic nephropathy     Vitamin D deficiency      Social History     Tobacco Use    Smoking status: Former Smoker    Smokeless tobacco: Never Used    Tobacco comment: smoked for ~6 months in 1968   Substance Use Topics    Alcohol use: No     Comment: previous social drinker     Social History     Substance and Sexual Activity   Drug Use No     Social History     Substance and Sexual Activity   Sexual Activity No       Per Today's Psychosocial:  Tobacco: Pt reports smoking for about 6 months in 1968, however reports that "The man I  made me quit".  Alcohol: Pt reports remote history of social ETOH use and reports no current use.  Illicit Drugs/Non-prescribed Medications: none, patient denies any use.    Patient " and Caregiver states clear understanding of the potential impact of substance use as it relates to transplant candidacy and is aware of possible random substance screening.  Substance abstinence/cessation counseling, education and resources provided and reviewed.     Arrests/DWI/Treatment/Rehab: patient denies    Psychiatric History:    Mental Health: Pt reports no history of or current mental health issues or concerns.   Psychiatrist/Counselor: Pt denies seeing a mental health professional and reports being open to seeing the psych department for talk therapy if necessary.  Medications:  Pt denies taking medications for mental health reasons. Pt does report that she takes Keppra for seizures.  Suicide/Homicide Issues: Pt denies any history of or current suicidal or homicidal ideations.    Safety at home: Pt reports no current or history of safety concerns in household.    Knowledge: Patient and Caregiver states having clear understanding and realistic expectations regarding the potential risks and potential benefits of organ transplantation and organ donation and agrees to discuss with health care team members and support system members, as well as to utilize available resources and express questions and/or concerns in order to further facilitate the pt informed decision-making.  Resources and information provided and reviewed.    Patient and Caregiver is aware of [facility]'s affiliation and/or partnership with agencies in home health care, LTAC, SNF, [facility], and other hospitals and clinics.    Understanding: Patient and Caregiver reports having a clear understanding of the many lifetime commitments involved with being a transplant recipient, including costs, compliance, medications, lab work, procedures, appointments, concrete and financial planning, preparedness, timely and appropriate communication of concerns, abstinence (ETOH, tobacco, illicit non-prescribed drugs), adherence to all health care team  recommendations, support system and caregiver involvement, appropriate and timely resource utilization and follow-through, mental health counseling as needed/recommended, and patient and caregiver responsibilities.  Social Service Handbook, resources and detailed educational information provided and reviewed.  Educational information provided.    Patient and Caregiver also reports current and expected compliance with health care regime and states having a clear understanding of the importance of compliance.      Patient and Caregiver reports a clear understanding that risks and benefits may be involved with organ transplantation and with organ donation.       Patient and Caregiver also reports clear understanding that psychosocial risk factors may affect patient, and include but are not limited to feelings of depression, generalized anxiety, anxiety regarding dependence on others, post traumatic stress disorder, feelings of guilt and other emotional and/or mental concerns, and/or exacerbation of existing mental health concerns.  Detailed resources provided and discussed.      Patient and Caregiver agrees to access appropriate resources in a timely manner as needed and/or as recommended, and to communicate concerns appropriately.  Patient and Caregiver also reports a clear understanding of treatment options available.     Patient and Caregiver received education in a group setting.   reviewed education, provided additional information, and answered questions.    Feelings or Concerns: Patient and Caregiver did not express any concerns at this time in regards to kidney transplant. Pt did express dissatisfaction that she had just learned about her kidney disease and reports that if her doctor would have said something earlier, it could have been prevented.    Coping: Pt reports coping well with the transplant process at this time and reports sitting at home and spending time with friends as ways to cope. Pt  reports [facility] home as [facility] in [place], [place] with Jerel Cintron presiding.     Goals: Pt reports staying healthy, traveling with friends, and getting off dialysis as goals for after transplant.    Interview Behavior: Patient and Caregiver presents as alert and oriented x 4, pleasant, good eye contact, well groomed, recall good, concentration/judgement good, average intelligence, calm, communicative, cooperative and asking and answering questions appropriately. Pt presents with friend: Kali Luna at pt's request.         Transplant Social Work - Candidacy  Assessment/Plan:     Psychosocial Suitability: Patient presents as a suitable candidate for kidney transplant at this time.Based on psychosocial risk factors, patient presents as low risk, due to adequate caregiver plan, insurance, and financial plan in place.     Recommendations/Additional Comments: SW recommends that pt conduct fundraising to assist pt with pay for cost of medications, food, gas, and other transplant related needs. SW recommends that pt remain aware of potential mental health concerns and contact the team if any concerns arise. SW recommends that pt remain abstinent from tobacco, ETOH, and drug use. SW remains available to answer any questions or concerns that arise as the pt moves through the transplant process.     Tamia Bowden LMSW

## 2019-02-21 NOTE — LETTER
February 24, 2019        Al Neves  [address]  [place] [address]  Phone: [phone]  Fax: [phone]             S[facility]ep Kaymeri- Transplant  [address]  [facility] [address]  Phone: [phone]   Patient: Britt Benton   MR Number: [id number]   YOB: 1950   Date of Visit: 2/21/2019       Dear Dr. Al Neves    Thank you for referring Britt Benton to me for evaluation. Attached you will find relevant portions of my assessment and plan of care.    If you have questions, please do not hesitate to call me. I look forward to following Britt Benton along with you.    Sincerely,    Kristine Keane MD    Enclosure    If you would like to receive this communication electronically, please contact [email or web address] or [phone] to request [employer] Link access.    [employer] Link is a tool which provides read-only access to select patient information with whom you have a relationship. Its easy to use and provides real time access to review your patients record including encounter summaries, notes, results, and demographic information.    If you feel you have received this communication in error or would no longer like to receive these types of communications, please e-mail [email or web address]

## 2019-02-21 NOTE — PROGRESS NOTES
Kidney Transplant Recipient Reevalulation    Referring Physician: Al Neves  Current Nephrologist: Al Neves  Waitlist Status: active  Dialysis Start Date: 9/27/2016    Subjective:     CC:  Six month reassessment of kidney transplant candidacy.    HPI:  Ms. Benton is a 69 y.o. 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  She has been on the wait list for a kidney transplant at [facility] since 9/27/2016. Patient is currently on peritoneal dialysis started on 9/27/16. Patient is dialyzing on cyclic peritoneal dialysis.  Patient reports that she is tolerating dialysis well. . She has a PD catheter. She denies any peritonitis or exit site infections.  Patient was last seen in listed RR clinic on 8/10/18. He denies any recent hospitalizations or ED visits.    States her home BPs are good at home.     She states she is working out at home on an elliptical since the start of Feb 2019 - 10-15 minutes at a time couple of times a day pretty much daily. No stairs to climb - she lives in a tailer. She will go grocery shopping and occasionally do yard work. With the activity she does she denies any symptoms of chest pain, SOB, claudication. She lifts her own PD fluid supplies! Patient was able to walk with this writer from clinic to the 2nd floor using the stairwell closest to the renal transplant clinic and back to clinic exam room without any BISWAS, chest pain, or claudication.    She is accompanied to visit by her friend Liz and Liz's .     Review of Systems  Constitutional: Denies fever/chills, fatigue, night sweats  EENT: +wears eye glasses; Denies vision problems, hearing problems, trouble swallowing.   Respiratory: Denies shortness of breath, dyspnea on exertion, orthopnea, wheezing, hemoptysis, denies known TB exposure or history of positive TB skin test  Cardiovascular: +history of CVA x3 - no residual deficits; she has been on Plavix since the first stoke;  Denies chest pain, palpitations, history of MI, history of DVT  Gastrointestinal: Denies abdominal pain, nausea/vomiting/diarrhea/constipation. Denies history of GI bleeding or ulcers.   Genitourinary: +last 24 hour urine collection was 800 cc; Denies history of kidney stones, recurrent UTI's, history of urinary obstruction, hematuria, dysuria, urinary frequency, incontinence  Musculoskeletal: +arthritis   Skin: Denies history of skin cancer, denies rash, ulcerations  Heme/onc: +bruises easily; Denies any history of cancer  Endocrine: +thyroid disease; +ganied 3 lbs since last transplant clinic visit   Neurological: +seizures - 4 seizures which lasted for ~5 minutes - no convulsions but blank stare vs passing out; first seizure in August 2016 waiting for PD catheter insertion and then she had 3 seizures during PD training sessions; she is on Keppra; +sinus headaches; +?memory problems but states her dialysis nurse states she has a good memory; pays all her bills; denies forgetting to take her meds or do dialysis; Denies tremors, dizziness, peripheral neuropathy  Psychiatric: Denies anxiety, depression. Denies hallucinations or delusions.    Potential Donors: no    Medications:  Current Outpatient Medications   Medication Sig Dispense Refill    aspirin (ECOTRIN) 81 MG EC tablet Take 81 mg by mouth once daily.      cetirizine (ZYRTEC) 10 MG tablet Take 10 mg by mouth once daily.      clopidogrel (PLAVIX) 75 mg tablet Take 75 mg by mouth once daily.      docusate sodium (COLACE) 100 MG capsule Take 100 mg by mouth 2 (two) times daily.      folic acid/vit B complex and C (ELAINE-FABBY ORAL) Take by mouth.      Lactobac no.41/Bifidobact no.7 (PROBIOTIC-10 ORAL) Take by mouth.      levetiracetam (KEPPRA) 250 MG Tab Take 250 mg by mouth 2 (two) times daily.      levothyroxine (SYNTHROID) 100 MCG tablet Take 100 mcg by mouth once daily.      montelukast (SINGULAIR) 10 mg tablet Take 10 mg by mouth once daily.       "rosuvastatin (CRESTOR) 20 MG tablet Take 20 mg by mouth once daily.      sevelamer carbonate (RENVELA) 800 mg Tab Take 800 mg by mouth 3 (three) times daily with meals. Patient states that she takes medication when she eats a meal high in phosphorus.      vitamin D 1000 units Tab Take 5,000 Units by mouth once daily.       No current facility-administered medications for this visit.             Objective:   body mass index is 27.91 kg/m².   BP (!) 181/88 (BP Location: Right arm, Patient Position: Sitting, BP Method: Medium (Automatic))   Pulse 92   Temp 98.4 °F (36.9 °C) (Oral)   Resp 16   Ht 5' 2.48" (1.587 m)   Wt 70.3 kg (154 lb 15.7 oz)   SpO2 99%   BMI 27.91 kg/m²       Physical Exam  General: No acute distress, well groomed, alert and oriented x 3  HEENT: Normocephalic, atraumatic, PERRLA, sclera anicteric, conjunctiva/corneas clear, EOM's intact bilaterally, external inspection of ears and nose normal, moist mucous membranes, no oral ulcerations/lesions   Neck: Supple, symmetrical, trachea midline, no masses, no carotid bruits, no JVD, thyroid is normal without nodules or enlargement   Respiratory: Clear to auscultation bilaterally, respirations unlabored, no rales/rhonchi/wheezing   Cardiovacular: Regular rate and rhythm, S1, S2 normal, no murmurs, no rubs or gallops   Gastrointestinal: Soft, non-tender, bowel sounds normal, no masses, no palpable organomegaly  Extremities: No clubbing or cyanosis of upper extremities bilaterally, no pedal edema bilaterally; +2 bilateral radial pulses  Skin: warm and dry; no rash on exposed skin  Lymph nodes: Cervical and supraclavicular nodes normal   Neurologic: No focal neurologic deficits.   Musculoskeletal: moves all extremities without difficulty, FROM, 5/5 strength in bilateral lower extremities, ambulates without an assistive device  Psychiatric: Normal mood and affect. Responds appropriately to questions.  Access: PD catheter    Labs:  Lab Results "   Component Value Date    WBC 8.87 11/15/2016    HGB 7.9 (L) 11/15/2016    HCT 24.2 (L) 11/15/2016     11/15/2016    K 3.0 (L) 11/15/2016     11/15/2016    CO2 24 11/15/2016    BUN 57 (H) 11/15/2016    CREATININE 10.1 (H) 11/15/2016    EGFRNONAA 3.6 (A) 11/15/2016    CALCIUM 9.3 11/15/2016    PHOS 6.2 (H) 11/15/2016    ALBUMIN 3.1 (L) 11/15/2016    AST 11 11/15/2016    ALT 10 11/15/2016    .0 (H) 08/10/2018       No results found for: PREALBUMIN, BILIRUBINUA, GGT, AMYLASE, LIPASE, PROTEINUA, NITRITE, RBCUA, WBCUA    No results found for: HLAABCTYPE    Lab Results   Component Value Date    CPRA 0 11/05/2018    CPRA 0 11/05/2018    CPRA 0 11/05/2018    EK9QAEE Negative 11/05/2018    CIABCLM WEAK B*40:02 09/05/2017    CIIAB Negative 11/05/2018       Labs were reviewed with the patient.    Pre-transplant Workup:   Reviewed with the patient.    Assessment:     1. ESRD on peritoneal dialysis    2. Current use of long term anticoagulation--Plavix    3. Anemia of renal disease    4. Essential hypertension    5. Patient on waiting list for kidney transplant    6. Type 2 diabetes mellitus with diabetic nephropathy, unspecified whether long term insulin use    7. Vitamin D deficiency    8. Hyperlipidemia, unspecified hyperlipidemia type    9. Hypothyroidism, unspecified type    10. Stroke x3 in 2014 and 2015    11. Awaiting organ transplant status        Plan:     Transplant Candidacy:   Ms. Benton is a suitable but higher risk kidney transplant candidate secondary to advanced age but she has good functional status as mentioned above.  Meets center eligibility for accepting HCV+ donor offer - yes.  Patient educated on HCV+ donors. Britt is willing to accept HCV+ donor offer -  yes   Patient is a candidate for KDPI > 85 kidney donor offer - no.  She remains in overall stable health, and will remain active on the transplant list.    Exercise: reminded Britt of the importance of regular exercise for weight  management, blood sugar and blood pressure management.  I also explained exercise has been shown to improve cardiovascular health, energy level, and sleep hygiene.  Lastly, I advised her that cardiovascular complications are leading cause of death and regular exercise can help lower this risk.    Encouraged her to seek living donors and to have them contact the living donor coordinator     Kristine Keane MD       Follow-up:   In addition to the tests noted in the plan, Ms. Benton will continue to have reevaluation as per the standing pre-kidney transplant protocol:  1. Monthly blood for PRA  2. Annual return to clinic, except HIV positive, > 65 years of age, or pancreas transplant candidates who will be scheduled to see transplant every 6 months while in pre-transplant phase  3. Annual re-testing: CXR, EKG, yearly mammograms for women over 40 and PSA for males over 40, cardiology follow-up as recommended by initial cardiology pre-transplant evaluation  4. Renal ultrasound every 2 years  5. Baseline colonoscopy after age 50 and repeated as recommended    UNOS Patient Status  Functional Status: 70% - Cares for self: unable to carry on normal activity or active work  Physical Capacity: No Limitations

## 2019-02-27 ENCOUNTER — TELEPHONE (OUTPATIENT)
Dept: TRANSPLANT | Facility: CLINIC | Age: 69
End: 2019-02-27

## 2019-03-13 ENCOUNTER — PATIENT MESSAGE (OUTPATIENT)
Dept: TRANSPLANT | Facility: CLINIC | Age: 69
End: 2019-03-13

## 2019-03-27 NOTE — PROGRESS NOTES
Patient's chart reviewed today. Patient due for follow-up in August 2020. Appointments will be scheduled per protocol. HLA sample current, in lab, and being received on a regular basis.

## 2019-04-24 ENCOUNTER — TELEPHONE (OUTPATIENT)
Dept: TRANSPLANT | Facility: CLINIC | Age: 69
End: 2019-04-24

## 2019-04-24 NOTE — TELEPHONE ENCOUNTER
Patient called and informed that the donor must initiate the call to begin the donor process. Phone number confirmed. Patient agreed.     ----- Message from Duy Rizzo sent at 4/24/2019 10:42 AM CDT -----  Contact: pt caregiver/Kali  Please call pt at [phone] if any questions    Patient has a potential kidney donor Melissadede Conway at [phone]    Thank you

## 2019-07-11 DIAGNOSIS — Z76.82 ORGAN TRANSPLANT CANDIDATE: Primary | ICD-10-CM

## 2019-08-27 ENCOUNTER — OFFICE VISIT (OUTPATIENT)
Dept: UROGYNECOLOGY | Facility: CLINIC | Age: 69
End: 2019-08-27
Payer: MEDICARE

## 2019-08-27 ENCOUNTER — HOSPITAL ENCOUNTER (OUTPATIENT)
Dept: RADIOLOGY | Facility: HOSPITAL | Age: 69
Discharge: HOME | End: 2019-08-27
Attending: NURSE PRACTITIONER
Payer: MEDICARE

## 2019-08-27 ENCOUNTER — OFFICE VISIT (OUTPATIENT)
Dept: TRANSPLANT | Facility: CLINIC | Age: 69
End: 2019-08-27
Payer: MEDICARE

## 2019-08-27 VITALS
DIASTOLIC BLOOD PRESSURE: 88 MMHG | HEART RATE: 89 BPM | SYSTOLIC BLOOD PRESSURE: 174 MMHG | RESPIRATION RATE: 18 BRPM | WEIGHT: 153.25 LBS | OXYGEN SATURATION: 98 % | TEMPERATURE: 98 F | HEIGHT: 63 IN | BODY MASS INDEX: 27.15 KG/M2

## 2019-08-27 VITALS
BODY MASS INDEX: 27.31 KG/M2 | SYSTOLIC BLOOD PRESSURE: 120 MMHG | WEIGHT: 154.13 LBS | HEIGHT: 63 IN | DIASTOLIC BLOOD PRESSURE: 60 MMHG

## 2019-08-27 DIAGNOSIS — I10 ESSENTIAL HYPERTENSION: Chronic | ICD-10-CM

## 2019-08-27 DIAGNOSIS — Z76.82 PATIENT ON WAITING LIST FOR KIDNEY TRANSPLANT: Primary | Chronic | ICD-10-CM

## 2019-08-27 DIAGNOSIS — Z01.419 WELL WOMAN EXAM: Primary | ICD-10-CM

## 2019-08-27 DIAGNOSIS — N05.2 MEMBRANOUS GLOMERULONEPHRITIS: ICD-10-CM

## 2019-08-27 DIAGNOSIS — Z99.2 ESRD ON PERITONEAL DIALYSIS: Chronic | ICD-10-CM

## 2019-08-27 DIAGNOSIS — Z12.4 ENCOUNTER FOR SCREENING FOR CERVICAL CANCER: ICD-10-CM

## 2019-08-27 DIAGNOSIS — E11.21 TYPE 2 DIABETES MELLITUS WITH DIABETIC NEPHROPATHY, UNSPECIFIED WHETHER LONG TERM INSULIN USE: Chronic | ICD-10-CM

## 2019-08-27 DIAGNOSIS — N95.2 VAGINAL ATROPHY: ICD-10-CM

## 2019-08-27 DIAGNOSIS — N18.6 ESRD ON PERITONEAL DIALYSIS: Chronic | ICD-10-CM

## 2019-08-27 DIAGNOSIS — Z76.82 ORGAN TRANSPLANT CANDIDATE: ICD-10-CM

## 2019-08-27 DIAGNOSIS — Z79.01 CURRENT USE OF LONG TERM ANTICOAGULATION: Chronic | ICD-10-CM

## 2019-08-27 DIAGNOSIS — N18.9 ANEMIA OF RENAL DISEASE: Chronic | ICD-10-CM

## 2019-08-27 DIAGNOSIS — D63.1 ANEMIA OF RENAL DISEASE: Chronic | ICD-10-CM

## 2019-08-27 PROCEDURE — 87624 HPV HI-RISK TYP POOLED RSLT: CPT | Mod: TXP

## 2019-08-27 PROCEDURE — 76770 US EXAM ABDO BACK WALL COMP: CPT | Mod: TC,TXP

## 2019-08-27 PROCEDURE — 99215 PR OFFICE/OUTPT VISIT, EST, LEVL V, 40-54 MIN: ICD-10-PCS | Mod: S$GLB,TXP,, | Performed by: NURSE PRACTITIONER

## 2019-08-27 PROCEDURE — 71046 X-RAY EXAM CHEST 2 VIEWS: CPT | Mod: TC,TXP

## 2019-08-27 PROCEDURE — 99999 PR PBB SHADOW E&M-EST. PATIENT-LVL V: CPT | Mod: PBBFAC,TXP,, | Performed by: NURSE PRACTITIONER

## 2019-08-27 PROCEDURE — 99999 PR PBB SHADOW E&M-EST. PATIENT-LVL V: ICD-10-PCS | Mod: PBBFAC,TXP,, | Performed by: NURSE PRACTITIONER

## 2019-08-27 PROCEDURE — G0101 PR CA SCREEN;PELVIC/BREAST EXAM: ICD-10-PCS | Mod: S$GLB,TXP,, | Performed by: NURSE PRACTITIONER

## 2019-08-27 PROCEDURE — 99999 PR PBB SHADOW E&M-EST. PATIENT-LVL IV: CPT | Mod: PBBFAC,TXP,, | Performed by: NURSE PRACTITIONER

## 2019-08-27 PROCEDURE — 88175 CYTOPATH C/V AUTO FLUID REDO: CPT | Mod: TXP

## 2019-08-27 PROCEDURE — 3288F FALL RISK ASSESSMENT DOCD: CPT | Mod: CPTII,S$GLB,TXP, | Performed by: NURSE PRACTITIONER

## 2019-08-27 PROCEDURE — 1100F PR PT FALLS ASSESS DOC 2+ FALLS/FALL W/INJURY/YR: ICD-10-PCS | Mod: CPTII,S$GLB,TXP, | Performed by: NURSE PRACTITIONER

## 2019-08-27 PROCEDURE — 3288F PR FALLS RISK ASSESSMENT DOCUMENTED: ICD-10-PCS | Mod: CPTII,S$GLB,TXP, | Performed by: NURSE PRACTITIONER

## 2019-08-27 PROCEDURE — 99999 PR PBB SHADOW E&M-EST. PATIENT-LVL IV: ICD-10-PCS | Mod: PBBFAC,TXP,, | Performed by: NURSE PRACTITIONER

## 2019-08-27 PROCEDURE — G0101 CA SCREEN;PELVIC/BREAST EXAM: HCPCS | Mod: S$GLB,TXP,, | Performed by: NURSE PRACTITIONER

## 2019-08-27 PROCEDURE — 76770 US EXAM ABDO BACK WALL COMP: CPT | Mod: 26,TXP,, | Performed by: RADIOLOGY

## 2019-08-27 PROCEDURE — 99215 OFFICE O/P EST HI 40 MIN: CPT | Mod: S$GLB,TXP,, | Performed by: NURSE PRACTITIONER

## 2019-08-27 PROCEDURE — 71046 X-RAY EXAM CHEST 2 VIEWS: CPT | Mod: 26,TXP,, | Performed by: RADIOLOGY

## 2019-08-27 PROCEDURE — 71046 XR CHEST PA AND LATERAL: ICD-10-PCS | Mod: 26,TXP,, | Performed by: RADIOLOGY

## 2019-08-27 PROCEDURE — 76770 US RETROPERITONEAL COMPLETE: ICD-10-PCS | Mod: 26,TXP,, | Performed by: RADIOLOGY

## 2019-08-27 PROCEDURE — 1100F PTFALLS ASSESS-DOCD GE2>/YR: CPT | Mod: CPTII,S$GLB,TXP, | Performed by: NURSE PRACTITIONER

## 2019-08-27 RX ORDER — CEPHALEXIN 500 MG/1
CAPSULE ORAL
Refills: 0 | COMMUNITY
Start: 2019-06-21 | End: 2019-08-27

## 2019-08-27 NOTE — LETTER
August 27, 2019      Dino Torres MD  [address]  [facility] [address]           [facility]  [address]  [facility] [address]  Phone: [phone]          Patient: Britt Benton   MR Number: [id number]   YOB: 1950   Date of Visit: 8/27/2019       Dear Dr. Dino Torres:    Thank you for referring Britt Benton to me for evaluation. Attached you will find relevant portions of my assessment and plan of care.    If you have questions, please do not hesitate to call me. I look forward to following Britt Benton along with you.    Sincerely,    Barbara Cline, BG    Enclosure  CC:  No Recipients    If you would like to receive this communication electronically, please contact [email or web address] or [phone] to request more information on [employer] access.    For providers and/or their staff who would like to refer a patient to [facility], please contact us through our one-stop-shop provider referral line, [facility], at [phone].    If you feel you have received this communication in error or would no longer like to receive these types of communications, please e-mail [email or web address]

## 2019-08-27 NOTE — PATIENT INSTRUCTIONS
1. Well woman  --pap today  --takes 2-3 weeks for results  --request records for mammogram    2. RTC 2 years for annual unless you receive your transplant, then yearly

## 2019-08-27 NOTE — PROGRESS NOTES
Kidney Transplant Recipient Reevalulation    Referring Physician: Al Neves  Current Nephrologist: Al Neves  Waitlist Status: active  Dialysis Start Date: 9/27/2016    Subjective:     CC:  Six month reassessment of kidney transplant candidacy.    HPI:  Ms. Benton is a 69 y.o. 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  She has been on the wait list for a kidney transplant at [facility] since 9/27/2016. Patient is currently on peritoneal dialysis started on 9/27/2016. Patient is dialyzing on cyclic peritoneal dialysis.  Patient reports that she is tolerating dialysis well. . She has a PD catheter. Patient denies any recent hospitalizations or ED visits. Denies peritonitis. Overall feels well. No health concerns today.   Denies chest pain, SOB, leg pain, abdominal pain or LUTs.  Stays active and walks 1 1/2 miles at the gym, wears a fit bit.     8/27/2019 Renal US   Impression     Findings consistent with chronic medical renal disease.  Previously noted simple right renal cyst is not appreciated on today's exam.       8/27/2019 CXR  FINDINGS:  Heart mediastinum are normal lungs are clear and the bones showed DJD.   Impression     No acute process seen.     2/21/2019 PXR  FINDINGS:  Dialysis catheter in the pelvis.  Mild DJD.  No fracture or bone destruction identified.  No significant vascular calcifications   Impression       See above     2/21/2019  TRANSTHORACIC ECHO (TTE) COMPLETE   Conclusion     · Normal left ventricular systolic function. The estimated ejection fraction is 60%  · No wall motion abnormalities.  · Concentric left ventricular remodeling.  · Normal LV diastolic function.  · Normal right ventricular systolic function.  · Based on RVOT acceleration time of 110ms, the estimated mean PA pressure is 22mmHg.  · Normal central venous pressure (3 mm Hg).            Past Medical History:   Diagnosis Date    Anemia of renal disease     ESRD on peritoneal dialysis      "Essential hypertension     Hyperlipidemia     Hypothyroidism     Stroke x2 in 2014 and 2015     Type 2 diabetes mellitus with diabetic nephropathy     Vitamin D deficiency        Review of Systems   Constitutional: Negative for activity change, appetite change, chills, fatigue, fever and unexpected weight change.   HENT: Negative for congestion, facial swelling, postnasal drip, rhinorrhea, sinus pressure, sore throat and trouble swallowing.    Eyes: Positive for visual disturbance. Negative for pain and redness.        Wears glasses   Respiratory: Negative for cough, chest tightness, shortness of breath and wheezing.    Cardiovascular: Negative.  Negative for chest pain, palpitations and leg swelling.   Gastrointestinal: Positive for constipation. Negative for abdominal pain, diarrhea, nausea and vomiting.   Genitourinary: Negative for dysuria, flank pain and urgency.        24 hour urine on 8/1/ 2019 was 800 cc   Musculoskeletal: Negative for gait problem, neck pain and neck stiffness.   Skin: Negative for rash.   Allergic/Immunologic: Negative for environmental allergies, food allergies and immunocompromised state.   Neurological: Negative for dizziness, weakness, light-headedness and headaches.   Hematological: Bruises/bleeds easily.        Plavix   Psychiatric/Behavioral: Negative for agitation and confusion. The patient is not nervous/anxious.        Objective:   body mass index is 27.46 kg/m².  BP (!) 174/88 (BP Location: Right arm, Patient Position: Sitting, BP Method: Medium (Automatic))   Pulse 89   Temp 98.1 °F (36.7 °C) (Oral)   Resp 18   Ht 5' 2.64" (1.591 m)   Wt 69.5 kg (153 lb 3.5 oz)   SpO2 98%   BMI 27.46 kg/m²     Physical Exam   Constitutional: She is oriented to person, place, and time. She appears well-developed and well-nourished.   HENT:   Head: Normocephalic.   Mouth/Throat: Oropharynx is clear and moist. No oropharyngeal exudate.   Eyes: Pupils are equal, round, and reactive to " light. Conjunctivae and EOM are normal. No scleral icterus.   Neck: Normal range of motion. Neck supple.   Cardiovascular: Normal rate, regular rhythm and normal heart sounds.   Pulmonary/Chest: Effort normal and breath sounds normal.   Abdominal: Soft. Normal appearance and bowel sounds are normal. She exhibits no distension and no mass. There is no splenomegaly or hepatomegaly. There is no tenderness. There is no rebound, no guarding, no CVA tenderness, no tenderness at McBurney's point and negative Batres's sign.       Musculoskeletal: Normal range of motion. She exhibits no edema.   Lymphadenopathy:     She has no cervical adenopathy.   Neurological: She is alert and oriented to person, place, and time. She exhibits normal muscle tone. Coordination normal.   Skin: Skin is warm and dry.   Psychiatric: She has a normal mood and affect. Her behavior is normal.   Vitals reviewed.      Labs:  Lab Results   Component Value Date    WBC 8.87 11/15/2016    HGB 7.9 (L) 11/15/2016    HCT 24.2 (L) 11/15/2016     11/15/2016    K 3.0 (L) 11/15/2016     11/15/2016    CO2 24 11/15/2016    BUN 57 (H) 11/15/2016    CREATININE 10.1 (H) 11/15/2016    EGFRNONAA 3.6 (A) 11/15/2016    CALCIUM 9.3 11/15/2016    PHOS 6.2 (H) 11/15/2016    ALBUMIN 3.1 (L) 11/15/2016    AST 11 11/15/2016    ALT 10 11/15/2016    .0 (H) 08/10/2018       No results found for: PREALBUMIN, BILIRUBINUA, GGT, AMYLASE, LIPASE, PROTEINUA, NITRITE, RBCUA, WBCUA    No results found for: HLAABCTYPE    Lab Results   Component Value Date    CPRA 0 11/05/2018    CPRA 0 11/05/2018    CPRA 0 11/05/2018    IE0OBUD Negative 11/05/2018    CIABCLM WEAK B*40:02 09/05/2017    CIIAB Negative 11/05/2018       Labs were reviewed with the patient.    Pre-transplant Workup:   Reviewed with the patient.    Assessment:     1. Patient on waiting list for kidney transplant    2. Type 2 diabetes mellitus with diabetic nephropathy, unspecified whether long term insulin  use    3. Membranous glomerulonephritis    4. ESRD on peritoneal dialysis    5. Essential hypertension    6. Current use of long term anticoagulation--Plavix    7. Anemia of renal disease        Plan:   Colon due 1/2020  GYN/PAP today 8/27/2019  MMG  - [facility] 5/[facility]--get records      Transplant Candidacy:   Ms. Benton is a suitable kidney transplant candidate.  Meets center eligibility for accepting HCV+ donor offer - yes.  Patient educated on HCV+ donors. Britt is willing  to accept HCV+ donor offer -  yes   Patient is a candidate for KDPI > 85 kidney donor offer - no d/t anticoagulation.   She remains in overall stable health, and will remain active on the transplant list.    Briana Acevedo NP       Follow-up:   In addition to the tests noted in the plan, Ms. Benton will continue to have reevaluation as per the standing pre-kidney transplant protocol:  1. Monthly blood for PRA  2. Annual return to clinic, except HIV positive, > 65 years of age, or pancreas transplant candidates who will be scheduled to see transplant every 6 months while in pre-transplant phase  3. Annual re-testing: CXR, EKG, yearly mammograms for women over 40 and PSA for males over 40, cardiology follow-up as recommended by initial cardiology pre-transplant evaluation  4. Renal ultrasound every 2 years  5. Baseline colonoscopy after age 50 and repeated as recommended    UNOS Patient Status  Functional Status: 60% - Requires occasional assistance but is able to care for needs  Physical Capacity: No Limitations

## 2019-08-27 NOTE — PROGRESS NOTES
PHARM.D. PRE-TRANSPLANT NOTE:    This patient's medication therapy was evaluated as part of her pre-transplant evaluation.      The following general pharmacologic concerns were noted: none    The following pharmacologic concerns related to HCV therapy were noted: none      This patient's medication profile was reviewed for contraindications for DAA Hepatitis C therapy:    [x]  No current inducers of CYP 3A4 or PGP  [x]  No amiodarone on this patient's EMR profile in the last 24 months  [x]  No past or current atrial fibrillation on this patient's EMR profile       Current Outpatient Medications   Medication Sig Dispense Refill    aspirin (ECOTRIN) 81 MG EC tablet Take 81 mg by mouth once daily.      cetirizine (ZYRTEC) 10 MG tablet Take 10 mg by mouth once daily.      clopidogrel (PLAVIX) 75 mg tablet Take 75 mg by mouth once daily.      docusate sodium (COLACE) 100 MG capsule Take 100 mg by mouth 2 (two) times daily.      folic acid/vit B complex and C (ELAINE-FABBY ORAL) Take by mouth.      Lactobac no.41/Bifidobact no.7 (PROBIOTIC-10 ORAL) Take by mouth.      levetiracetam (KEPPRA) 250 MG Tab Take 250 mg by mouth 2 (two) times daily.      levothyroxine (SYNTHROID) 100 MCG tablet Take 100 mcg by mouth once daily.      montelukast (SINGULAIR) 10 mg tablet Take 10 mg by mouth once daily.      rosuvastatin (CRESTOR) 20 MG tablet Take 20 mg by mouth once daily.      sevelamer carbonate (RENVELA) 800 mg Tab Take 800 mg by mouth 3 (three) times daily with meals. Patient states that she takes medication when she eats a meal high in phosphorus.      vitamin D 1000 units Tab Take 5,000 Units by mouth once daily.       No current facility-administered medications for this visit.        I am available for consultation and can be contacted, as needed by the other members of the Kidney Transplant team.

## 2019-08-27 NOTE — PROGRESS NOTES
"Transplant Recipient Adult Psychosocial Assessment (UPDATE)    Britt Benton  [address]  [facility] [address]  Telephone Information:   Mobile [phone]   Home  [phone] (home)  Work  There is no work phone number on file.  E-mail  [email or web address]    Sex: female  YOB: 1950  Age: 69 y.o.    Encounter Date: 8/27/2019  [place] Citizen: yes  Primary Language: English   Needed: no    Emergency Contact:  Name: Kali Luna (pronounced "East")  Relationship: friend (Pt and Kali report that they've been friends since they were 12 yo)  Address: [address] [place] [address]  Phone Numbers:  [phone] (home), [phone] (mobile)    Family/Social Support:   Number of dependents/: Pt reports no dependents.  Marital history: Pt reports 1 previous marriage which ended in divorce. Pt reports no current significant other.  Other family dynamics: Pt presents with close friends: Kali and Lana Juhifitz Luna; sister: Esmer; and 2 adult children: Daniel and Joe whom pt  Identifies as supportive. Pt also reports 3 brothers: Robert, Eric, and Carlos.    Household Composition:  Name: Britt Benton  Age: 69  Relationship: patient  Does person drive? yes    Do you and your caregivers have access to reliable transportation? yes     PRIMARY CAREGIVER: Jimmy Luna (friend) will be primary caregiver, phone number [phone]. Pt reports that after transplant, she plans on staying at the . She has contacted her insurance company and they offer up to 10k for housing during transplant. Pt confirms understanding that she may have to pay for it up front and then be reimbursed. Pt confirms multiple times that she plans on doing that and can afford to put the money up front.      provided in-depth information to patient and caregiver regarding pre- and post-transplant caregiver role.   strongly encourages patient " and caregiver to have concrete plan regarding post-transplant care giving, including back-up caregiver(s) to ensure care giving needs are met as needed.    Patient and Caregiver states understanding all aspects of caregiver role/commitment and is able/willing/committed to being caregiver to the fullest extent necessary.    Patient and Caregiver verbalizes understanding of the education provided today and caregiver responsibilities.         remains available. Patient and Caregiver agree to contact  in a timely manner if concerns arise.      Able to take time off work without financial concerns: yes.     Additional Significant Others who will Assist with Transplant:  Name: Esmer Adams  Age: 86  Phone: [phone]  City: [place] State: [place]  Relationship: sister  Does person drive? yes    Living Will: no Education and information provided to pt. Pt reports that she has already discussed her end-of-life desires with her sons.  Daniel Benton ([phone])  Healthcare Power of : no Education and information provided to pt. Pt reports that she has already discussed her end-of-life desires with her sons.  Daniel Benton ([phone])  Advance Directives on file: <<no information> per medical record.  Verbally reviewed LW/HCPA information.   provided patient with copy of LW/HCPA documents and provided education on completion of forms.    Living Donors: No. Education and resource information given to patient.    Highest Education Level: High School (9-12) or GED  Reading Ability: college  Reports difficulty with: seeing and wears bifocals  Learns Best By:  Pt reports pt learns best by a combination of verbal, written, and tactile instructions.     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    Spouse/Significant Other  "Employment: Pt reports no current significant other.    Disabled: no    Monthly Income:  FDC: $1817  SSI: $161     Able to afford all costs now and if transplanted, including medications: yes. Pt reports that she continues to "hardly have any bills", budgets well, and reports family and friends will assist if necessary. Pt reports that she has contacted her insurance company and knows that her [employer] could be 1k a month. Pt reports that she has not conducted any fundraising, but has been putting money away. SW counseled pt on how important it is to be able to afford medications and pt reports that her family would be willing to help. Pt reports she is interested in fund raising.  Patient and Caregiver verbalizes understanding of personal responsibilities related to transplant costs and the importance of having a financial plan to ensure that patients transplant costs are fully covered.       provided fundraising information/education. Patient and Caregiververbalizes understanding.   remains available.    Insurance:   Payor/Plan Subscr  Sex Relation Sub. Ins. ID Effective Group Num   1. [employer] MANAGE* KATIE KNOX 1950 Female  [id number] 1/1/15 [id number]                                   [address] [address]     Primary Insurance (for UNOS reporting): Public Insurance - Medicare & Choice  Secondary Insurance (for UNOS reporting): None  Patient and Caregiver verbalizes clear understanding that patient may experience difficulty obtaining and/or be denied insurance coverage post-surgery. This includes and is not limited to disability insurance, life insurance, health insurance, burial insurance, long term care insurance, and other insurances.      Patient and Caregiver also reports understanding that future health concerns related to or unrelated to transplantation may not be covered by patient's insurance.  Resources and information provided and reviewed.     Patient and Caregiver " "provides verbal permission to release any necessary information to outside resources for patient care and discharge planning.  Resources and information provided are reviewed.      Dialysis Adherence: Patient reports being on peritoneal dialysis attending all dialysis appointments and completing the entire course of treatment. SW faxed an adherence form (see Letters section) and is awaiting a fax back.    Infusion Service: patient utilizing? no  Home Health: patient utilizing? no Pt reports using HH in Aug. 2016 after her surgery. Pt reports they came out for wound care for 3-4 days.  DME: yes PD equipment, BP Cuff, and Glucometer  Pulmonary/Cardiac Rehab: Pt denies   ADLS:  Pt reports no difficulties with driving, walking, bathing, cooking, housekeeping, eating, shopping, and taking medication.    Adherence:   Pt reports good adherence with medications, dialysis, and health regimen.  Adherence education and counseling provided.     Per History Section:  Past Medical History:   Diagnosis Date    Anemia of renal disease     ESRD on peritoneal dialysis     Essential hypertension     Hyperlipidemia     Hypothyroidism     Stroke x2 in 2014 and 2015     Type 2 diabetes mellitus with diabetic nephropathy     Vitamin D deficiency      Social History     Tobacco Use    Smoking status: Former Smoker    Smokeless tobacco: Never Used    Tobacco comment: smoked for ~6 months in 1968   Substance Use Topics    Alcohol use: No     Comment: previous social drinker     Social History     Substance and Sexual Activity   Drug Use No     Social History     Substance and Sexual Activity   Sexual Activity Never       Per Today's Psychosocial:  Tobacco: Pt reports smoking for about 6 months in 1968, however reports that "The man I  made me quit".  Alcohol: Pt reports remote history of social ETOH use and reports no current use.  Illicit Drugs/Non-prescribed Medications: none, patient denies any use.    Patient and " Caregiver states clear understanding of the potential impact of substance use as it relates to transplant candidacy and is aware of possible random substance screening.  Substance abstinence/cessation counseling, education and resources provided and reviewed.     Arrests/DWI/Treatment/Rehab: patient denies    Psychiatric History:    Mental Health: Pt reports no history of or current mental health issues or concerns.   Psychiatrist/Counselor: Pt denies seeing a mental health professional and reports being open to seeing the psych department for talk therapy if necessary.  Medications:  Pt denies taking medications for mental health reasons. Pt does report that she takes Keppra for seizures.  Suicide/Homicide Issues: Pt denies any history of or current suicidal or homicidal ideations.    Safety at home: Pt reports no current or history of safety concerns in household.    Knowledge: Patient and Caregiver states having clear understanding and realistic expectations regarding the potential risks and potential benefits of organ transplantation and organ donation and agrees to discuss with health care team members and support system members, as well as to utilize available resources and express questions and/or concerns in order to further facilitate the pt informed decision-making.  Resources and information provided and reviewed.    Patient and Caregiver is aware of [facility]'s affiliation and/or partnership with agencies in home health care, LTAC, SNF, [facility], [place], and other hospitals and clinics.    Understanding: Patient and Caregiver reports having a clear understanding of the many lifetime commitments involved with being a transplant recipient, including costs, compliance, medications, lab work, procedures, appointments, concrete and financial planning, preparedness, timely and appropriate communication of concerns, abstinence (ETOH, tobacco, illicit non-prescribed drugs), adherence to all health care team recommendations,  support system and caregiver involvement, appropriate and timely resource utilization and follow-through, mental health counseling as needed/recommended, and patient and caregiver responsibilities.  Social Service Handbook, resources and detailed educational information provided and reviewed.  Educational information provided.    Patient and Caregiver also reports current and expected compliance with health care regime and states having a clear understanding of the importance of compliance.      Patient and Caregiver reports a clear understanding that risks and benefits may be involved with organ transplantation and with organ donation.       Patient and Caregiver also reports clear understanding that psychosocial risk factors may affect patient, and include but are not limited to feelings of depression, generalized anxiety, anxiety regarding dependence on others, post traumatic stress disorder, feelings of guilt and other emotional and/or mental concerns, and/or exacerbation of existing mental health concerns.  Detailed resources provided and discussed.      Patient and Caregiver agrees to access appropriate resources in a timely manner as needed and/or as recommended, and to communicate concerns appropriately.  Patient and Caregiver also reports a clear understanding of treatment options available.     Patient and Caregiver received education in a group setting.   reviewed education, provided additional information, and answered questions.    Feelings or Concerns: Patient and Caregiver did not express any concerns at this time in regards to kidney transplant. Pt did express dissatisfaction that she had just learned about her kidney disease and reports that if her doctor would have said something earlier, it could have been prevented.    Coping: Pt reports coping well with the transplant process at this time and reports sitting at home and spending time with friends as ways to cope. Pt reports [employer]  home as [facility] in [place], [place] with Jerel Cintron presiding.     Goals: Pt reports staying healthy, traveling with friends, and getting off dialysis as goals for after transplant.    Interview Behavior: Patient and Caregiver presents as alert and oriented x 4, pleasant, good eye contact, well groomed, recall good, concentration/judgement good, average intelligence, calm, communicative, cooperative and asking and answering questions appropriately. Pt presents with friend: Kali Luna at pt's request.         Transplant Social Work - Candidacy  Assessment/Plan:     Psychosocial Suitability: Patient presents as a suitable candidate for kidney transplant at this time.Based on psychosocial risk factors, patient presents as low risk, due to adequate caregiver plan, insurance, and financial plan in place.     Recommendations/Additional Comments: SW recommends that pt conduct fundraising to assist pt with pay for cost of medications, food, gas, and other transplant related needs. SW recommends that pt remain aware of potential mental health concerns and contact the team if any concerns arise. SW recommends that pt remain abstinent from tobacco, ETOH, and drug use. SW remains available to answer any questions or concerns that arise as the pt moves through the transplant process.     Tamia Bowden LMSW

## 2019-08-27 NOTE — LETTER
August 29, 2019        Al Neves  [address]  [place] [address]  Phone: [phone]  Fax: [phone]             Sandeep Aleks- Transplant  [address]  [facility] [address]  Phone: [phone]   Patient: Britt Benton   MR Number: [id number]   YOB: 1950   Date of Visit: 8/27/2019       Dear Dr. Al Neves    Thank you for referring Britt Benton to me for evaluation. Attached you will find relevant portions of my assessment and plan of care.    If you have questions, please do not hesitate to call me. I look forward to following Britt Benton along with you.    Sincerely,    Briana Acevedo, NP    Enclosure    If you would like to receive this communication electronically, please contact [email or web address] or [phone] to request [employer] access.    [employer] is a tool which provides read-only access to select patient information with whom you have a relationship. Its easy to use and provides real time access to review your patients record including encounter summaries, notes, results, and demographic information.    If you feel you have received this communication in error or would no longer like to receive these types of communications, please e-mail [email or web address]

## 2019-08-27 NOTE — PROGRESS NOTES
2019    SUBJECTIVE:   69 y.o. female for annual exam.    Past Medical History:   Diagnosis Date    Anemia of renal disease     ESRD on peritoneal dialysis     Essential hypertension     Hyperlipidemia     Hypothyroidism     Stroke x2 in  and      Type 2 diabetes mellitus with diabetic nephropathy     Vitamin D deficiency        Past Surgical History:   Procedure Laterality Date    BREAST BIOPSY Right     benign per patient    HYSTERECTOMY  in the     per patient for abnormal bleeding    OOPHORECTOMY Bilateral in the     PERITONEAL CATHETER INSERTION  2016       Current Outpatient Medications   Medication Sig Dispense Refill    aspirin (ECOTRIN) 81 MG EC tablet Take 81 mg by mouth once daily.      cetirizine (ZYRTEC) 10 MG tablet Take 10 mg by mouth once daily.      clopidogrel (PLAVIX) 75 mg tablet Take 75 mg by mouth once daily.      docusate sodium (COLACE) 100 MG capsule Take 100 mg by mouth 2 (two) times daily.      folic acid/vit B complex and C (ELAINE-FABBY ORAL) Take by mouth.      Lactobac no.41/Bifidobact no.7 (PROBIOTIC-10 ORAL) Take by mouth.      levetiracetam (KEPPRA) 250 MG Tab Take 250 mg by mouth 2 (two) times daily.      levothyroxine (SYNTHROID) 100 MCG tablet Take 100 mcg by mouth once daily.      montelukast (SINGULAIR) 10 mg tablet Take 10 mg by mouth once daily.      rosuvastatin (CRESTOR) 20 MG tablet Take 20 mg by mouth once daily.      sevelamer carbonate (RENVELA) 800 mg Tab Take 800 mg by mouth 3 (three) times daily with meals. Patient states that she takes medication when she eats a meal high in phosphorus.      vitamin D 1000 units Tab Take 5,000 Units by mouth once daily.       No current facility-administered medications for this visit.      Allergies: Patient has no known allergies.   No LMP recorded. Patient has had a hysterectomy.         x 2    Well Woman:  Pap:denies history of abnormal pap-- post  "hysterectomy  Mammo:05/2019 normal per patient report  Colonoscopy:due 01/2020-- hx polyp  Dexa:2017 osteopenia per patient report      Family History  Family History   Problem Relation Age of Onset    Cancer Mother         passed away at 68 from cancer of GI tract    Cancer Father         passed away in his 80s from cancer of GI tract    Colon cancer Sister     Diabetes Mellitus Sister     Kidney cancer Neg Hx     Heart attack Neg Hx     Stroke Neg Hx         ROS:  Feeling well.   No new dyspnea or chest pain on exertion.    No abdominal pain, change in bowel habits, black or bloody stools.    Rare UUI if bladder full.   GYN ROS: no breast pain or new or enlarging lumps on self exam, no vaginal bleeding. No neurological complaints.    OBJECTIVE:   The patient appears well, alert, oriented x 3, in no distress.  /60 (BP Location: Right arm, Patient Position: Sitting, BP Method: Medium (Manual))   Ht 5' 3" (1.6 m)   Wt 69.9 kg (154 lb 1.6 oz)   BMI 27.30 kg/m²   ENT normal.  Neck supple. No adenopathy or thyromegaly. CODY.   Lungs are clear, good air entry, no wheezes, rhonchi or rales.   S1 and S2 normal, no murmurs, regular rate and rhythm.   Abdomen soft without tenderness, guarding, mass or organomegaly.   Extremities show no edema, normal peripheral pulses.   Neurological is normal, no focal findings.    BREAST EXAM: breasts appear normal, no suspicious masses, no skin or nipple changes or axillary nodes    PELVIC EXAM:   VULVA: normal appearing vulva with no masses, tenderness or lesions, +caruncle   VAGINA: normal appearing vagina with normal color and discharge, no lesions, atrophic,  CERVIX: surgically absent,   UTERUS: absent,   ADNEXA: no masses,   RECTAL: normal rectal, no masses    ASSESSMENT:   1. Well woman exam     2. Encounter for screening for cervical cancer   HPV High Risk Genotypes, PCR    Liquid-based pap smear, screening   3. Vaginal atrophy         PLAN:   1. Well woman  --pap " today  --takes 2-3 weeks for results  --request records for mammogram    2. RTC 2 years for annual unless you receive your transplant, then yearly        30 minutes were spent in face to face time with this patient  90 % of this time was spent in counseling and/or coordination of care  Barbara TINAJERO [facility]  Division of Female Pelvic Medicine and Reconstructive Surgery  Department of Obstetrics & Gynecology

## 2019-08-30 NOTE — PROGRESS NOTES
LISTED PATIENT EDUCATION NOTE    Ms. Britt Benton was seen in LISTED kidney transplant clinic for evaluation for kidney, kidney/pancreas or pancreas only transplant.  The patient attended a group education session that discussed/reviewed the following aspects of transplantation: evaluation and selection committee process, UNOS waitlist management/multiple listings, types of organs offered (KDPI < 85%, KDPI > 85%, PHS increased risk, DCD, HCV+), financial aspects, surgical procedures, dietary instruction pre- and post-transplant, health maintenance pre- and post-transplant, post-transplant hospitalization and outpatient follow-up, potential to participate in a research protocol, and medication management and side effects.  A question and answer session was provided after the presentation.    The patient was seen by all members of the multi-disciplinary team to include: Nephrologist/PA, Surgeon, , Transplant Coordinator, , Pharmacist and Dietician (if applicable).    The patient reviewed and signed all consents for evaluation which were witnessed and sent to scanning into the EPIC chart.    The patient was given an education book and plan for further evaluation based on her individual assessment.      The patient was encouraged to call with any questions or concerns.

## 2019-08-31 LAB
HPV HR 12 DNA CVX QL NAA+PROBE: NEGATIVE
HPV16 AG SPEC QL: NEGATIVE
HPV18 DNA SPEC QL NAA+PROBE: NEGATIVE

## 2019-09-06 ENCOUNTER — PATIENT MESSAGE (OUTPATIENT)
Dept: UROGYNECOLOGY | Facility: CLINIC | Age: 69
End: 2019-09-06

## 2019-10-07 ENCOUNTER — SOCIAL WORK (OUTPATIENT)
Dept: TRANSPLANT | Facility: CLINIC | Age: 69
End: 2019-10-07

## 2019-10-07 NOTE — PROGRESS NOTES
Dialysis Adherence:    SW received a faxed adherence form from 's dialysis center indicates over last three months that the patient has had no AMAs, not had any no-shows, and no issues with caregivers, transportation, or any other psychosocial concerns.      Form also indicates:    Last intact PTH from 07/01/2019 is reported as 345.    Current dry weight is reported as 65kg and Most Recent Pre-treatment weight is reported as 69kg on 07/10/19.

## 2020-01-09 DIAGNOSIS — Z76.82 ORGAN TRANSPLANT CANDIDATE: Primary | ICD-10-CM

## 2020-01-10 ENCOUNTER — TELEPHONE (OUTPATIENT)
Dept: TRANSPLANT | Facility: CLINIC | Age: 70
End: 2020-01-10

## 2020-03-10 ENCOUNTER — TELEPHONE (OUTPATIENT)
Dept: CARDIOLOGY | Facility: CLINIC | Age: 70
End: 2020-03-10

## 2020-03-11 DIAGNOSIS — Z76.82 ORGAN TRANSPLANT CANDIDATE: Primary | ICD-10-CM

## 2020-03-12 ENCOUNTER — HOSPITAL ENCOUNTER (OUTPATIENT)
Dept: RADIOLOGY | Facility: HOSPITAL | Age: 70
Discharge: HOME | End: 2020-03-12
Attending: NURSE PRACTITIONER
Payer: MEDICARE

## 2020-03-12 ENCOUNTER — OFFICE VISIT (OUTPATIENT)
Dept: TRANSPLANT | Facility: CLINIC | Age: 70
End: 2020-03-12
Payer: MEDICARE

## 2020-03-12 ENCOUNTER — HOSPITAL ENCOUNTER (OUTPATIENT)
Dept: CARDIOLOGY | Facility: CLINIC | Age: 70
Discharge: HOME | End: 2020-03-12
Attending: NURSE PRACTITIONER
Payer: MEDICARE

## 2020-03-12 VITALS
SYSTOLIC BLOOD PRESSURE: 158 MMHG | TEMPERATURE: 98 F | OXYGEN SATURATION: 98 % | BODY MASS INDEX: 26.21 KG/M2 | DIASTOLIC BLOOD PRESSURE: 73 MMHG | HEART RATE: 94 BPM | RESPIRATION RATE: 16 BRPM | WEIGHT: 142.44 LBS | HEIGHT: 62 IN

## 2020-03-12 VITALS — HEIGHT: 62 IN | BODY MASS INDEX: 26.31 KG/M2 | WEIGHT: 143 LBS

## 2020-03-12 DIAGNOSIS — I63.9 STROKE OF UNKNOWN ETIOLOGY: ICD-10-CM

## 2020-03-12 DIAGNOSIS — D63.1 ANEMIA OF RENAL DISEASE: Chronic | ICD-10-CM

## 2020-03-12 DIAGNOSIS — Z76.82 PATIENT ON WAITING LIST FOR KIDNEY TRANSPLANT: Primary | Chronic | ICD-10-CM

## 2020-03-12 DIAGNOSIS — Z76.82 ORGAN TRANSPLANT CANDIDATE: ICD-10-CM

## 2020-03-12 DIAGNOSIS — E11.21 TYPE 2 DIABETES MELLITUS WITH DIABETIC NEPHROPATHY, UNSPECIFIED WHETHER LONG TERM INSULIN USE: Chronic | ICD-10-CM

## 2020-03-12 DIAGNOSIS — Z99.2 ESRD ON PERITONEAL DIALYSIS: Chronic | ICD-10-CM

## 2020-03-12 DIAGNOSIS — N18.9 ANEMIA OF RENAL DISEASE: Chronic | ICD-10-CM

## 2020-03-12 DIAGNOSIS — I10 ESSENTIAL HYPERTENSION: Chronic | ICD-10-CM

## 2020-03-12 DIAGNOSIS — N18.6 ESRD ON PERITONEAL DIALYSIS: Chronic | ICD-10-CM

## 2020-03-12 DIAGNOSIS — Z79.01 CURRENT USE OF LONG TERM ANTICOAGULATION: Chronic | ICD-10-CM

## 2020-03-12 LAB
ASCENDING AORTA: 3.01 CM
BSA FOR ECHO PROCEDURE: 1.68 M2
CV ECHO LV RWT: 0.44 CM
CV STRESS BASE HR: 86 BPM
DIASTOLIC BLOOD PRESSURE: 65 MMHG
DOP CALC LVOT AREA: 3.3 CM2
DOP CALC LVOT DIAMETER: 2.06 CM
DOP CALC LVOT PEAK VEL: 1.32 M/S
DOP CALC LVOT STROKE VOLUME: 79.08 CM3
DOP CALCLVOT PEAK VEL VTI: 23.74 CM
E WAVE DECELERATION TIME: 125.91 MSEC
E/A RATIO: 0.88
E/E' RATIO: 8.53 M/S
ECHO LV POSTERIOR WALL: 0.98 CM (ref 0.6–1.1)
FRACTIONAL SHORTENING: 37 % (ref 28–44)
INTERVENTRICULAR SEPTUM: 0.98 CM (ref 0.6–1.1)
LA MAJOR: 3.65 CM
LA MINOR: 3.62 CM
LA WIDTH: 3.26 CM
LEFT ATRIUM SIZE: 3.76 CM
LEFT ATRIUM VOLUME INDEX: 22.8 ML/M2
LEFT ATRIUM VOLUME: 37.87 CM3
LEFT INTERNAL DIMENSION IN SYSTOLE: 2.8 CM (ref 2.1–4)
LEFT VENTRICLE DIASTOLIC VOLUME INDEX: 53.47 ML/M2
LEFT VENTRICLE DIASTOLIC VOLUME: 88.62 ML
LEFT VENTRICLE MASS INDEX: 87 G/M2
LEFT VENTRICLE SYSTOLIC VOLUME INDEX: 17.9 ML/M2
LEFT VENTRICLE SYSTOLIC VOLUME: 29.59 ML
LEFT VENTRICULAR INTERNAL DIMENSION IN DIASTOLE: 4.42 CM (ref 3.5–6)
LEFT VENTRICULAR MASS: 144.82 G
LV LATERAL E/E' RATIO: 6.4 M/S
LV SEPTAL E/E' RATIO: 12.8 M/S
MV PEAK A VEL: 0.73 M/S
MV PEAK E VEL: 0.64 M/S
OHS CV CPX 1 MINUTE RECOVERY HEART RATE: 126 BPM
OHS CV CPX 85 PERCENT MAX PREDICTED HEART RATE MALE: 122
OHS CV CPX ESTIMATED METS: 9
OHS CV CPX MAX PREDICTED HEART RATE: 144
OHS CV CPX PATIENT IS FEMALE: 1
OHS CV CPX PATIENT IS MALE: 0
OHS CV CPX PEAK DIASTOLIC BLOOD PRESSURE: 62 MMHG
OHS CV CPX PEAK HEAR RATE: 134 BPM
OHS CV CPX PEAK RATE PRESSURE PRODUCT: NORMAL
OHS CV CPX PEAK SYSTOLIC BLOOD PRESSURE: 170 MMHG
OHS CV CPX PERCENT MAX PREDICTED HEART RATE ACHIEVED: 93
OHS CV CPX RATE PRESSURE PRODUCT PRESENTING: NORMAL
PULM VEIN S/D RATIO: 1.66
PV PEAK D VEL: 0.5 M/S
PV PEAK S VEL: 0.83 M/S
RA MAJOR: 3.17 CM
RA PRESSURE: 3 MMHG
RA WIDTH: 2.72 CM
RIGHT VENTRICULAR END-DIASTOLIC DIMENSION: 3.14 CM
RV TISSUE DOPPLER FREE WALL SYSTOLIC VELOCITY 1 (APICAL 4 CHAMBER VIEW): 14.33 CM/S
SINUS: 2.69 CM
STJ: 3.14 CM
STRESS ECHO POST EXERCISE DUR MIN: 6 MINUTES
STRESS ECHO POST EXERCISE DUR SEC: 0 SECONDS
SYSTOLIC BLOOD PRESSURE: 146 MMHG
TDI LATERAL: 0.1 M/S
TDI SEPTAL: 0.05 M/S
TDI: 0.08 M/S
TRICUSPID ANNULAR PLANE SYSTOLIC EXCURSION: 2.01 CM

## 2020-03-12 PROCEDURE — 93351 STRESS TTE COMPLETE: CPT | Mod: 26,TXP,, | Performed by: INTERNAL MEDICINE

## 2020-03-12 PROCEDURE — 99215 PR OFFICE/OUTPT VISIT, EST, LEVL V, 40-54 MIN: ICD-10-PCS | Mod: S$GLB,TXP,, | Performed by: NURSE PRACTITIONER

## 2020-03-12 PROCEDURE — 1101F PR PT FALLS ASSESS DOC 0-1 FALLS W/OUT INJ PAST YR: ICD-10-PCS | Mod: CPTII,S$GLB,TXP, | Performed by: NURSE PRACTITIONER

## 2020-03-12 PROCEDURE — 93978 VASCULAR STUDY: CPT | Mod: TC,TXP

## 2020-03-12 PROCEDURE — 93978 US DOPP ILIACS BILATERAL: ICD-10-PCS | Mod: 26,TXP,, | Performed by: RADIOLOGY

## 2020-03-12 PROCEDURE — 1159F PR MEDICATION LIST DOCUMENTED IN MEDICAL RECORD: ICD-10-PCS | Mod: S$GLB,TXP,, | Performed by: NURSE PRACTITIONER

## 2020-03-12 PROCEDURE — 1101F PT FALLS ASSESS-DOCD LE1/YR: CPT | Mod: CPTII,S$GLB,TXP, | Performed by: NURSE PRACTITIONER

## 2020-03-12 PROCEDURE — 93351 STRESS ECHO (CUPID ONLY): ICD-10-PCS | Mod: 26,TXP,, | Performed by: INTERNAL MEDICINE

## 2020-03-12 PROCEDURE — 99999 PR PBB SHADOW E&M-EST. PATIENT-LVL IV: ICD-10-PCS | Mod: PBBFAC,TXP,, | Performed by: NURSE PRACTITIONER

## 2020-03-12 PROCEDURE — 77067 SCR MAMMO BI INCL CAD: CPT | Mod: 26,TXP,, | Performed by: RADIOLOGY

## 2020-03-12 PROCEDURE — 93978 VASCULAR STUDY: CPT | Mod: 26,TXP,, | Performed by: RADIOLOGY

## 2020-03-12 PROCEDURE — 99999 PR PBB SHADOW E&M-EST. PATIENT-LVL IV: CPT | Mod: PBBFAC,TXP,, | Performed by: NURSE PRACTITIONER

## 2020-03-12 PROCEDURE — 1126F PR PAIN SEVERITY QUANTIFIED, NO PAIN PRESENT: ICD-10-PCS | Mod: S$GLB,TXP,, | Performed by: NURSE PRACTITIONER

## 2020-03-12 PROCEDURE — 77067 MAMMO DIGITAL SCREENING BILAT WITH CAD: ICD-10-PCS | Mod: 26,TXP,, | Performed by: RADIOLOGY

## 2020-03-12 PROCEDURE — 1159F MED LIST DOCD IN RCRD: CPT | Mod: S$GLB,TXP,, | Performed by: NURSE PRACTITIONER

## 2020-03-12 PROCEDURE — 3078F DIAST BP <80 MM HG: CPT | Mod: CPTII,S$GLB,TXP, | Performed by: NURSE PRACTITIONER

## 2020-03-12 PROCEDURE — 99215 OFFICE O/P EST HI 40 MIN: CPT | Mod: S$GLB,TXP,, | Performed by: NURSE PRACTITIONER

## 2020-03-12 PROCEDURE — 93351 STRESS TTE COMPLETE: CPT | Mod: TXP

## 2020-03-12 PROCEDURE — 3077F SYST BP >= 140 MM HG: CPT | Mod: CPTII,S$GLB,TXP, | Performed by: NURSE PRACTITIONER

## 2020-03-12 PROCEDURE — 77067 SCR MAMMO BI INCL CAD: CPT | Mod: TC,TXP

## 2020-03-12 PROCEDURE — 3077F PR MOST RECENT SYSTOLIC BLOOD PRESSURE >= 140 MM HG: ICD-10-PCS | Mod: CPTII,S$GLB,TXP, | Performed by: NURSE PRACTITIONER

## 2020-03-12 PROCEDURE — 1126F AMNT PAIN NOTED NONE PRSNT: CPT | Mod: S$GLB,TXP,, | Performed by: NURSE PRACTITIONER

## 2020-03-12 PROCEDURE — 3078F PR MOST RECENT DIASTOLIC BLOOD PRESSURE < 80 MM HG: ICD-10-PCS | Mod: CPTII,S$GLB,TXP, | Performed by: NURSE PRACTITIONER

## 2020-03-12 RX ORDER — GLIMEPIRIDE 1 MG/1
1 TABLET ORAL
Status: ON HOLD | COMMUNITY
End: 2021-03-29 | Stop reason: HOSPADM

## 2020-03-12 RX ORDER — AMOXICILLIN 500 MG
1 CAPSULE ORAL DAILY
COMMUNITY

## 2020-03-12 NOTE — PROGRESS NOTES
Kidney Transplant Recipient Reevalulation    Referring Physician: Al Neves  Current Nephrologist: Al Neves  Waitlist Status: active  Dialysis Start Date: 9/27/2016    Subjective:     CC:  Six month reassessment of kidney transplant candidacy.    HPI:  Ms. Benton is a 70 y.o. 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  She has been on the wait list for a kidney transplant at [facility] since 9/27/2016. Patient is currently on peritoneal dialysis started on 9/27/2016. Patient is dialyzing on cyclic peritoneal dialysis.  Patient reports that she is tolerating dialysis well. . She has a PD catheter. Patient denies any recent hospitalizations or ED visits. Denies peritonitis. Overall feels well. No health concerns today.   Denies chest pain, SOB, leg pain, abdominal pain or LUTs.  Stays walks 1 1/2 miles at the gym, wears a fit bit.     Reports a bleeding GI ulcer in 12/2019 bc H/H was trending down  Underwent EGD and She is was prescribed to take Protonix  daily   She was started on Glimepiride in 2/2019 , and BS have been more stable.   States HbG has increased to 9.1 on 3/2/2020 at dialysis center      3/12/2020 iliac doppler   Impression     Symmetric flow in bilateral iliac arteries and veins.         8/27/2019 Renal US   Impression     Findings consistent with chronic medical renal disease.  Previously noted simple right renal cyst is not appreciated on today's exam.       8/27/2019 CXR  FINDINGS:  Heart mediastinum are normal lungs are clear and the bones showed DJD.   Impression     No acute process seen.     2/21/2019 PXR  FINDINGS:  Dialysis catheter in the pelvis.  Mild DJD.  No fracture or bone destruction identified.  No significant vascular calcifications   Impression       See above     2/21/2019  TRANSTHORACIC ECHO (TTE) COMPLETE   Conclusion     · Normal left ventricular systolic function. The estimated ejection fraction is 60%  · No wall motion  abnormalities.  · Concentric left ventricular remodeling.  · Normal LV diastolic function.  · Normal right ventricular systolic function.  · Based on RVOT acceleration time of 110ms, the estimated mean PA pressure is 22mmHg.  · Normal central venous pressure (3 mm Hg).            Past Medical History:   Diagnosis Date    Anemia of renal disease     ESRD on peritoneal dialysis     Essential hypertension     Hyperlipidemia     Hypothyroidism     Stroke x2 in 2014 and 2015     Type 2 diabetes mellitus with diabetic nephropathy     Vitamin D deficiency        Review of Systems   Constitutional: Negative for activity change, appetite change, chills, fatigue, fever and unexpected weight change.   HENT: Negative for congestion, facial swelling, postnasal drip, rhinorrhea, sinus pressure, sore throat and trouble swallowing.    Eyes: Positive for visual disturbance. Negative for pain and redness.        Wears glasses   Respiratory: Negative for cough, chest tightness, shortness of breath and wheezing.    Cardiovascular: Negative.  Negative for chest pain, palpitations and leg swelling.   Gastrointestinal: Positive for constipation. Negative for abdominal pain, diarrhea, nausea and vomiting.   Genitourinary: Negative for dysuria, flank pain and urgency.         Makes about 800 cc/day    Musculoskeletal: Negative for gait problem, neck pain and neck stiffness.   Skin: Negative for rash.   Allergic/Immunologic: Negative for environmental allergies, food allergies and immunocompromised state.   Neurological: Negative for dizziness, weakness, light-headedness and headaches.   Hematological: Bruises/bleeds easily.        Plavix   Psychiatric/Behavioral: Negative for agitation and confusion. The patient is not nervous/anxious.        Objective:   body mass index is 25.68 kg/m².  BP (!) 158/73 (BP Location: Right arm, Patient Position: Sitting, BP Method: Medium (Automatic))   Pulse 94   Temp 97.8 °F (36.6 °C) (Oral)    "Resp 16   Ht 5' 2.44" (1.586 m)   Wt 64.6 kg (142 lb 6.7 oz)   SpO2 98%   BMI 25.68 kg/m²     Physical Exam   Constitutional: She is oriented to person, place, and time. She appears well-developed and well-nourished.   HENT:   Head: Normocephalic.   Mouth/Throat: Oropharynx is clear and moist. No oropharyngeal exudate.   Eyes: Pupils are equal, round, and reactive to light. Conjunctivae and EOM are normal. No scleral icterus.   Neck: Normal range of motion. Neck supple.   Cardiovascular: Normal rate, regular rhythm and normal heart sounds.   Pulmonary/Chest: Effort normal and breath sounds normal.   Abdominal: Soft. Normal appearance and bowel sounds are normal. She exhibits no distension and no mass. There is no splenomegaly or hepatomegaly. There is no tenderness. There is no rebound, no guarding, no CVA tenderness, no tenderness at McBurney's point and negative Batres's sign.       Musculoskeletal: Normal range of motion. She exhibits no edema.   Lymphadenopathy:     She has no cervical adenopathy.   Neurological: She is alert and oriented to person, place, and time. She exhibits normal muscle tone. Coordination normal.   Skin: Skin is warm and dry.   Psychiatric: She has a normal mood and affect. Her behavior is normal.   Vitals reviewed.      Labs:  Lab Results   Component Value Date    WBC 8.87 11/15/2016    HGB 7.9 (L) 11/15/2016    HCT 24.2 (L) 11/15/2016     11/15/2016    K 3.0 (L) 11/15/2016     11/15/2016    CO2 24 11/15/2016    BUN 57 (H) 11/15/2016    CREATININE 10.1 (H) 11/15/2016    EGFRNONAA 3.6 (A) 11/15/2016    CALCIUM 9.3 11/15/2016    PHOS 6.2 (H) 11/15/2016    ALBUMIN 3.1 (L) 11/15/2016    AST 11 11/15/2016    ALT 10 11/15/2016    .0 (H) 08/27/2019       No results found for: PREALBUMIN, BILIRUBINUA, GGT, AMYLASE, LIPASE, PROTEINUA, NITRITE, RBCUA, WBCUA    No results found for: HLAABCTYPE    Lab Results   Component Value Date    CPRA 0 11/01/2019    CPRA 0 11/01/2019    " CPRA 0 11/01/2019    WJ2RMTA Negative 11/01/2019    CIABCLM WEAK B*40:02 09/05/2017    CIIAB Negative 11/01/2019       Labs were reviewed with the patient.    Pre-transplant Workup:   Reviewed with the patient.    Assessment:     1. Patient on waiting list for kidney transplant    2. ESRD on peritoneal dialysis    3. Type 2 diabetes mellitus with diabetic nephropathy, unspecified whether long term insulin use    4. Essential hypertension    5. Anemia of renal disease    6. Current use of long term anticoagulation--Plavix    7. Stroke x3 in 2014 and 2015        Plan:   Colon due 1/2020   Echo treadmill stress test, MMG pending         Transplant Candidacy:   Ms. Benton is a suitable kidney transplant candidate.  Meets center eligibility for accepting HCV+ donor offer - yes.  Patient educated on HCV+ donors. Britt is willing  to accept HCV+ donor offer -  yes   Patient is a candidate for KDPI > 85 kidney donor offer - no d/t anticoagulation.   She remains in overall stable health, and will remain active on the transplant list.    Briana Acevedo NP       Follow-up:   In addition to the tests noted in the plan, Ms. Bentno will continue to have reevaluation as per the standing pre-kidney transplant protocol:  1. Monthly blood for PRA  2. Annual return to clinic, except HIV positive, > 65 years of age, or pancreas transplant candidates who will be scheduled to see transplant every 6 months while in pre-transplant phase  3. Annual re-testing: CXR, EKG, yearly mammograms for women over 40 and PSA for males over 40, cardiology follow-up as recommended by initial cardiology pre-transplant evaluation  4. Renal ultrasound every 2 years  5. Baseline colonoscopy after age 50 and repeated as recommended    UNOS Patient Status  Functional Status: 60% - Requires occasional assistance but is able to care for needs  Physical Capacity: No Limitations

## 2020-03-12 NOTE — LETTER
March 16, 2020        Al Neves  [address]  [place] [address]  Phone: [phone]  Fax: [phone]             Sandeep Aleks- Transplant  [address]  [facility] [address]  Phone: [phone]   Patient: Britt Benton   MR Number: [id number]   YOB: 1950   Date of Visit: 3/12/2020       Dear Dr. Al Neves    Thank you for referring Britt Benton to me for evaluation. Attached you will find relevant portions of my assessment and plan of care.    If you have questions, please do not hesitate to call me. I look forward to following Britt Benton along with you.    Sincerely,    Briana Acevedo, NP    Enclosure    If you would like to receive this communication electronically, please contact [email or web address] or [phone] to request [employer] access.    [employer] is a tool which provides read-only access to select patient information with whom you have a relationship. Its easy to use and provides real time access to review your patients record including encounter summaries, notes, results, and demographic information.    If you feel you have received this communication in error or would no longer like to receive these types of communications, please e-mail [email or web address]

## 2020-03-17 NOTE — PROGRESS NOTES
"Transplant Recipient Adult Psychosocial Assessment pdate    Britt Benton  [address]  [facility] [address]  Telephone Information:   Mobile [phone]   Home  [phone] (home)  Work  There is no work phone number on file.  E-mail  [email or web address]    Sex: female  YOB: 1950  Age: 70 y.o.    Encounter Date: 3/12/2020  [place] Citizen: yes  Primary Language: English   Needed: no    Emergency Contact:  Name: Kali Luna (pronounced "East")  Relationship: friend (Pt and Kali report that they've been friends since they were 10 yo)  Address: [address] [place] [id number]  Phone Numbers:  [phone] (home), [phone] (mobile)    Family/Social Support:   Number of dependents/: Pt reports no dependents.  Marital history: Pt reports 1 previous marriage which ended in divorce. Pt reports no current significant other.  Other family dynamics: Pt presents with close friends: Kali and Lana Antonyfitz Luna; sister: Esmer; and 2 adult children: Daniel and Joe whom pt  Identifies as supportive. Pt also reports 3 brothers: Robert, Eric, and Carlos.    Household Composition:  Name: Britt Benton  Age: 70  Relationship: patient  Does person drive? yes    Do you and your caregivers have access to reliable transportation? yes     PRIMARY CAREGIVER: Jimmy Luna (friend) will be primary caregiver, phone number [phone]. Pt reports that after transplant, she plans on staying at the . She has contacted her insurance company and they offer up to 10k for housing during transplant. Pt confirms understanding that she may have to pay for it up front and then be reimbursed. Pt confirms multiple times that she plans on doing that and can afford to put the money up front. (Same plan 3/12/20)     provided in-depth information to patient and caregiver regarding pre- and post-transplant caregiver role.   strongly " encourages patient and caregiver to have concrete plan regarding post-transplant care giving, including back-up caregiver(s) to ensure care giving needs are met as needed.    Patient and Caregiver states understanding all aspects of caregiver role/commitment and is able/willing/committed to being caregiver to the fullest extent necessary.    Patient and Caregiver verbalizes understanding of the education provided today and caregiver responsibilities.         remains available. Patient and Caregiver agree to contact  in a timely manner if concerns arise.      Able to take time off work without financial concerns: yes.     Additional Significant Others who will Assist with Transplant:  Name: Esmer Adams  Age: 87  Phone: [phone]  City: [place] State: [place]  Relationship: sister  Does person drive? yes    Living Will: no Education and information provided to pt. Pt reports that she has already discussed her end-of-life desires with her sons.  Daniel Benton ([phone])  Healthcare Power of : no Education and information provided to pt. Pt reports that she has already discussed her end-of-life desires with her sons.  Daniel Benton ([phone])  Advance Directives on file: <<no information> per medical record.  Verbally reviewed LW/HCPA information.   provided patient with copy of LW/HCPA documents and provided education on completion of forms.    Living Donors: No. Education and resource information given to patient.    Highest Education Level: High School (9-12) or GED  Reading Ability: college  Reports difficulty with: seeing and wears bifocals  Learns Best By:  Pt reports pt learns best by a combination of verbal, written, and tactile instructions.     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    Spouse/Significant Other Employment: Pt reports no current significant other.    Disabled: no    Monthly Income:  MCC: $1817  SSI: $161     Able to afford all costs now and if transplanted, including medications: yes. Pt reports that she continues to "hardly have any bills", budgets well, and reports family and friends will assist if necessary. Pt reports that she has contacted her insurance company and knows that her [employer] could be 1k a month. Pt reports that she has not conducted any fundraising, but has been putting money away. SW counseled pt on how important it is to be able to afford medications and pt reports that her family would be willing to help. Pt reports she is interested in fund raising.  Patient and Caregiver verbalizes understanding of personal responsibilities related to transplant costs and the importance of having a financial plan to ensure that patients transplant costs are fully covered.       provided fundraising information/education. Patient and Caregiververbalizes understanding.   remains available.    Insurance:   Payor/Plan Subscr  Sex Relation Sub. Ins. ID Effective Group Num   1. HU[employer] MANAGE* KATIE KNOX 1950 Female  [id number] 1/1/15 [id number]                                   [address] [address]   2. [employer] - ABHI* KATIE KNOX 1950 Female  [id number] 1/1/18 [id number]                                   [address] [address]       Primary Insurance (for UNOS reporting): Public Insurance - Medicare & Choice  Secondary Insurance (for UNOS reporting): None  Patient and Caregiver verbalizes clear understanding that patient may experience difficulty obtaining and/or be denied insurance coverage post-surgery. This includes and is not limited to disability insurance, life insurance, health insurance, burial insurance, long term care insurance, and other insurances.      Patient and Caregiver also reports understanding that future health concerns " "related to or unrelated to transplantation may not be covered by patient's insurance.  Resources and information provided and reviewed.     Patient and Caregiver provides verbal permission to release any necessary information to outside resources for patient care and discharge planning.  Resources and information provided are reviewed.      Dialysis Adherence: Patient reports being on peritoneal dialysis attending all dialysis appointments and completing the entire course of treatment. SW faxed an adherence form and is awaiting a fax back.    Infusion Service: patient utilizing? no  Home Health: patient utilizing? no Pt reports using HH in Aug. 2016 after her surgery. Pt reports they came out for wound care for 3-4 days.  DME: yes PD equipment, BP Cuff, and Glucometer  Pulmonary/Cardiac Rehab: Pt denies   ADLS:  Pt reports no difficulties with driving, walking, bathing, cooking, housekeeping, eating, shopping, and taking medication.    Adherence:   Pt reports good adherence with medications, dialysis, and health regimen.  Adherence education and counseling provided.     Per History Section:  Past Medical History:   Diagnosis Date    Anemia of renal disease     ESRD on peritoneal dialysis     Essential hypertension     Hyperlipidemia     Hypothyroidism     Stroke x2 in 2014 and 2015     Type 2 diabetes mellitus with diabetic nephropathy     Vitamin D deficiency      Social History     Tobacco Use    Smoking status: Former Smoker    Smokeless tobacco: Never Used    Tobacco comment: smoked for ~6 months in 1968   Substance Use Topics    Alcohol use: No     Comment: previous social drinker     Social History     Substance and Sexual Activity   Drug Use No     Social History     Substance and Sexual Activity   Sexual Activity Never       Per Today's Psychosocial:  Tobacco: Pt reports smoking for about 6 months in 1968, however reports that "The man I  made me quit".  Alcohol: Pt reports remote history " of social ETOH use and reports no current use.  Illicit Drugs/Non-prescribed Medications: none, patient denies any use.    Patient and Caregiver states clear understanding of the potential impact of substance use as it relates to transplant candidacy and is aware of possible random substance screening.  Substance abstinence/cessation counseling, education and resources provided and reviewed.     Arrests/DWI/Treatment/Rehab: patient denies    Psychiatric History:    Mental Health: Pt reports no history of or current mental health issues or concerns.   Psychiatrist/Counselor: Pt denies seeing a mental health professional and reports being open to seeing the psych department for talk therapy if necessary.  Medications:  Pt denies taking medications for mental health reasons. Pt does report that she takes Keppra for seizures.  Suicide/Homicide Issues: Pt denies any history of or current suicidal or homicidal ideations.    Safety at home: Pt reports no current or history of safety concerns in household.    Knowledge: Patient and Caregiver states having clear understanding and realistic expectations regarding the potential risks and potential benefits of organ transplantation and organ donation and agrees to discuss with health care team members and support system members, as well as to utilize available resources and express questions and/or concerns in order to further facilitate the pt informed decision-making.  Resources and information provided and reviewed.    Patient and Caregiver is aware of [facility] affiliation and/or partnership with agencies in home health care, LTAC, SNF, [facility], and other hospitals and clinics.    Understanding: Patient and Caregiver reports having a clear understanding of the many lifetime commitments involved with being a transplant recipient, including costs, compliance, medications, lab work, procedures, appointments, concrete and financial planning, preparedness, timely and appropriate  communication of concerns, abstinence (ETOH, tobacco, illicit non-prescribed drugs), adherence to all health care team recommendations, support system and caregiver involvement, appropriate and timely resource utilization and follow-through, mental health counseling as needed/recommended, and patient and caregiver responsibilities.  Social Service Handbook, resources and detailed educational information provided and reviewed.  Educational information provided.    Patient and Caregiver also reports current and expected compliance with health care regime and states having a clear understanding of the importance of compliance.      Patient and Caregiver reports a clear understanding that risks and benefits may be involved with organ transplantation and with organ donation.       Patient and Caregiver also reports clear understanding that psychosocial risk factors may affect patient, and include but are not limited to feelings of depression, generalized anxiety, anxiety regarding dependence on others, post traumatic stress disorder, feelings of guilt and other emotional and/or mental concerns, and/or exacerbation of existing mental health concerns.  Detailed resources provided and discussed.      Patient and Caregiver agrees to access appropriate resources in a timely manner as needed and/or as recommended, and to communicate concerns appropriately.  Patient and Caregiver also reports a clear understanding of treatment options available.     Patient and Caregiver received education in a group setting.   reviewed education, provided additional information, and answered questions.    Feelings or Concerns: Patient and Caregiver did not express any concerns at this time in regards to kidney transplant. Pt did express dissatisfaction that she had just learned about her kidney disease and reports that if her doctor would have said something earlier, it could have been prevented.    Coping: Pt reports coping well  with the transplant process at this time and reports sitting at home and spending time with friends as ways to cope. Pt reports [facility] as [facility] in [place], [place] with Jerel Cintron presiding.     Goals: Pt reports staying healthy, traveling with friends, and getting off dialysis as goals for after transplant.    Interview Behavior: Patient and Caregiver presents as alert and oriented x 4, pleasant, good eye contact, well groomed, recall good, concentration/judgement good, average intelligence, calm, communicative, cooperative and asking and answering questions appropriately. Pt presents with friends: Jimmy Luna at pt's request.  Pt's friends presented as supportive of pt's pursuit of transplant.         Transplant Social Work - Candidacy  Assessment/Plan:     Psychosocial Suitability: Patient presents as a suitable candidate for kidney transplant at this time.Based on psychosocial risk factors, patient presents as low risk, due to adequate caregiver plan, insurance, and financial plan in place.     Recommendations/Additional Comments: SW recommends that pt conduct fundraising to assist pt with pay for cost of medications, food, gas, and other transplant related needs. SW recommends that pt remain aware of potential mental health concerns and contact the team if any concerns arise. SW recommends that pt remain abstinent from tobacco, ETOH, and drug use. SW remains available to answer any questions or concerns that arise as the pt moves through the transplant process.     Joy Lemos LCSW

## 2020-03-17 NOTE — PROGRESS NOTES
Patient's chart reviewed today. Patient due for follow-up in September 2020. Appointments will be scheduled per protocol. HLA sample current, in lab, and being received on a regular basis.

## 2020-03-18 ENCOUNTER — TELEPHONE (OUTPATIENT)
Dept: TRANSPLANT | Facility: CLINIC | Age: 70
End: 2020-03-18

## 2020-03-20 ENCOUNTER — TELEPHONE (OUTPATIENT)
Dept: TRANSPLANT | Facility: CLINIC | Age: 70
End: 2020-03-20

## 2020-03-20 NOTE — TELEPHONE ENCOUNTER
"Compliance check:  Dialysis unit reports in the past three months pt has had "n/a compliant w/txs" no shows,  "n/a compliant w/txs"  AMAs, labs , no lab concerns, transportation no concerns noted and family support no concerns noted.    Reported by DU via fax back sheet    "

## 2020-07-27 DIAGNOSIS — Z76.82 ORGAN TRANSPLANT CANDIDATE: Primary | ICD-10-CM

## 2020-07-28 ENCOUNTER — TELEPHONE (OUTPATIENT)
Dept: TRANSPLANT | Facility: CLINIC | Age: 70
End: 2020-07-28

## 2020-10-15 ENCOUNTER — TELEPHONE (OUTPATIENT)
Dept: TRANSPLANT | Facility: CLINIC | Age: 70
End: 2020-10-15

## 2020-12-10 ENCOUNTER — PATIENT MESSAGE (OUTPATIENT)
Dept: TRANSPLANT | Facility: CLINIC | Age: 70
End: 2020-12-10

## 2020-12-29 DIAGNOSIS — Z76.82 ORGAN TRANSPLANT CANDIDATE: Primary | ICD-10-CM

## 2021-01-08 ENCOUNTER — PATIENT MESSAGE (OUTPATIENT)
Dept: TRANSPLANT | Facility: CLINIC | Age: 71
End: 2021-01-08

## 2021-01-08 ENCOUNTER — TELEPHONE (OUTPATIENT)
Dept: TRANSPLANT | Facility: CLINIC | Age: 71
End: 2021-01-08

## 2021-01-14 ENCOUNTER — HOSPITAL ENCOUNTER (OUTPATIENT)
Dept: RADIOLOGY | Facility: HOSPITAL | Age: 71
Discharge: HOME | End: 2021-01-14
Attending: NURSE PRACTITIONER
Payer: MEDICARE

## 2021-01-14 ENCOUNTER — OFFICE VISIT (OUTPATIENT)
Dept: TRANSPLANT | Facility: CLINIC | Age: 71
End: 2021-01-14
Payer: MEDICARE

## 2021-01-14 VITALS
HEIGHT: 63 IN | DIASTOLIC BLOOD PRESSURE: 75 MMHG | TEMPERATURE: 98 F | SYSTOLIC BLOOD PRESSURE: 183 MMHG | HEART RATE: 97 BPM | WEIGHT: 149.25 LBS | OXYGEN SATURATION: 98 % | BODY MASS INDEX: 26.45 KG/M2 | RESPIRATION RATE: 16 BRPM

## 2021-01-14 DIAGNOSIS — E11.21 TYPE 2 DIABETES MELLITUS WITH DIABETIC NEPHROPATHY, UNSPECIFIED WHETHER LONG TERM INSULIN USE: Chronic | ICD-10-CM

## 2021-01-14 DIAGNOSIS — N18.9 ANEMIA OF RENAL DISEASE: Chronic | ICD-10-CM

## 2021-01-14 DIAGNOSIS — I10 ESSENTIAL HYPERTENSION: Chronic | ICD-10-CM

## 2021-01-14 DIAGNOSIS — Z76.82 PATIENT ON WAITING LIST FOR KIDNEY TRANSPLANT: Primary | Chronic | ICD-10-CM

## 2021-01-14 DIAGNOSIS — E78.5 HYPERLIPIDEMIA, UNSPECIFIED HYPERLIPIDEMIA TYPE: Chronic | ICD-10-CM

## 2021-01-14 DIAGNOSIS — Z76.82 ORGAN TRANSPLANT CANDIDATE: ICD-10-CM

## 2021-01-14 DIAGNOSIS — E55.9 VITAMIN D DEFICIENCY: Chronic | ICD-10-CM

## 2021-01-14 DIAGNOSIS — D63.1 ANEMIA OF RENAL DISEASE: Chronic | ICD-10-CM

## 2021-01-14 DIAGNOSIS — Z99.2 ESRD ON PERITONEAL DIALYSIS: Chronic | ICD-10-CM

## 2021-01-14 DIAGNOSIS — N18.6 ESRD ON PERITONEAL DIALYSIS: Chronic | ICD-10-CM

## 2021-01-14 PROCEDURE — 3288F PR FALLS RISK ASSESSMENT DOCUMENTED: ICD-10-PCS | Mod: CPTII,S$GLB,TXP, | Performed by: NURSE PRACTITIONER

## 2021-01-14 PROCEDURE — 3077F SYST BP >= 140 MM HG: CPT | Mod: CPTII,S$GLB,TXP, | Performed by: NURSE PRACTITIONER

## 2021-01-14 PROCEDURE — 71046 XR CHEST PA AND LATERAL: ICD-10-PCS | Mod: 26,TXP,, | Performed by: RADIOLOGY

## 2021-01-14 PROCEDURE — 3077F PR MOST RECENT SYSTOLIC BLOOD PRESSURE >= 140 MM HG: ICD-10-PCS | Mod: CPTII,S$GLB,TXP, | Performed by: NURSE PRACTITIONER

## 2021-01-14 PROCEDURE — 99215 OFFICE O/P EST HI 40 MIN: CPT | Mod: S$GLB,TXP,, | Performed by: NURSE PRACTITIONER

## 2021-01-14 PROCEDURE — 76770 US EXAM ABDO BACK WALL COMP: CPT | Mod: TC,TXP

## 2021-01-14 PROCEDURE — 1101F PR PT FALLS ASSESS DOC 0-1 FALLS W/OUT INJ PAST YR: ICD-10-PCS | Mod: CPTII,S$GLB,TXP, | Performed by: NURSE PRACTITIONER

## 2021-01-14 PROCEDURE — 1101F PT FALLS ASSESS-DOCD LE1/YR: CPT | Mod: CPTII,S$GLB,TXP, | Performed by: NURSE PRACTITIONER

## 2021-01-14 PROCEDURE — 1126F PR PAIN SEVERITY QUANTIFIED, NO PAIN PRESENT: ICD-10-PCS | Mod: S$GLB,TXP,, | Performed by: NURSE PRACTITIONER

## 2021-01-14 PROCEDURE — 1159F MED LIST DOCD IN RCRD: CPT | Mod: S$GLB,TXP,, | Performed by: NURSE PRACTITIONER

## 2021-01-14 PROCEDURE — 3288F FALL RISK ASSESSMENT DOCD: CPT | Mod: CPTII,S$GLB,TXP, | Performed by: NURSE PRACTITIONER

## 2021-01-14 PROCEDURE — 99999 PR PBB SHADOW E&M-EST. PATIENT-LVL IV: CPT | Mod: PBBFAC,TXP,, | Performed by: NURSE PRACTITIONER

## 2021-01-14 PROCEDURE — 71046 X-RAY EXAM CHEST 2 VIEWS: CPT | Mod: 26,TXP,, | Performed by: RADIOLOGY

## 2021-01-14 PROCEDURE — 3008F BODY MASS INDEX DOCD: CPT | Mod: CPTII,S$GLB,TXP, | Performed by: NURSE PRACTITIONER

## 2021-01-14 PROCEDURE — 76770 US RETROPERITONEAL COMPLETE: ICD-10-PCS | Mod: 26,TXP,, | Performed by: RADIOLOGY

## 2021-01-14 PROCEDURE — 3078F DIAST BP <80 MM HG: CPT | Mod: CPTII,S$GLB,TXP, | Performed by: NURSE PRACTITIONER

## 2021-01-14 PROCEDURE — 76770 US EXAM ABDO BACK WALL COMP: CPT | Mod: 26,TXP,, | Performed by: RADIOLOGY

## 2021-01-14 PROCEDURE — 3078F PR MOST RECENT DIASTOLIC BLOOD PRESSURE < 80 MM HG: ICD-10-PCS | Mod: CPTII,S$GLB,TXP, | Performed by: NURSE PRACTITIONER

## 2021-01-14 PROCEDURE — 1126F AMNT PAIN NOTED NONE PRSNT: CPT | Mod: S$GLB,TXP,, | Performed by: NURSE PRACTITIONER

## 2021-01-14 PROCEDURE — 1159F PR MEDICATION LIST DOCUMENTED IN MEDICAL RECORD: ICD-10-PCS | Mod: S$GLB,TXP,, | Performed by: NURSE PRACTITIONER

## 2021-01-14 PROCEDURE — 99999 PR PBB SHADOW E&M-EST. PATIENT-LVL IV: ICD-10-PCS | Mod: PBBFAC,TXP,, | Performed by: NURSE PRACTITIONER

## 2021-01-14 PROCEDURE — 3008F PR BODY MASS INDEX (BMI) DOCUMENTED: ICD-10-PCS | Mod: CPTII,S$GLB,TXP, | Performed by: NURSE PRACTITIONER

## 2021-01-14 PROCEDURE — 71046 X-RAY EXAM CHEST 2 VIEWS: CPT | Mod: TC,TXP

## 2021-01-14 PROCEDURE — 99215 PR OFFICE/OUTPT VISIT, EST, LEVL V, 40-54 MIN: ICD-10-PCS | Mod: S$GLB,TXP,, | Performed by: NURSE PRACTITIONER

## 2021-01-14 RX ORDER — FERRIC CITRATE 210 MG/1
TABLET, COATED ORAL
Status: ON HOLD | COMMUNITY
End: 2021-03-29 | Stop reason: HOSPADM

## 2021-01-28 ENCOUNTER — TELEPHONE (OUTPATIENT)
Dept: TRANSPLANT | Facility: CLINIC | Age: 71
End: 2021-01-28

## 2021-02-07 ENCOUNTER — TELEPHONE (OUTPATIENT)
Dept: TRANSPLANT | Facility: CLINIC | Age: 71
End: 2021-02-07

## 2021-02-08 ENCOUNTER — HOSPITAL ENCOUNTER (INPATIENT)
Facility: HOSPITAL | Age: 71
LOS: 1 days | Discharge: HOME | DRG: 682 | End: 2021-02-08
Attending: TRANSPLANT SURGERY | Admitting: SURGERY
Payer: MEDICARE

## 2021-02-08 VITALS
RESPIRATION RATE: 16 BRPM | TEMPERATURE: 98 F | OXYGEN SATURATION: 98 % | HEIGHT: 63 IN | SYSTOLIC BLOOD PRESSURE: 142 MMHG | WEIGHT: 145.75 LBS | DIASTOLIC BLOOD PRESSURE: 70 MMHG | HEART RATE: 81 BPM | BODY MASS INDEX: 25.82 KG/M2

## 2021-02-08 DIAGNOSIS — Z01.818 PRE-OP EVALUATION: ICD-10-CM

## 2021-02-08 DIAGNOSIS — N18.6 ESRD ON PERITONEAL DIALYSIS: Primary | Chronic | ICD-10-CM

## 2021-02-08 DIAGNOSIS — Z76.82 KIDNEY TRANSPLANT CANDIDATE: ICD-10-CM

## 2021-02-08 DIAGNOSIS — U07.1 COVID-19 VIRUS DETECTED: ICD-10-CM

## 2021-02-08 DIAGNOSIS — Z99.2 ESRD ON PERITONEAL DIALYSIS: Primary | Chronic | ICD-10-CM

## 2021-02-08 LAB
25(OH)D3+25(OH)D2 SERPL-MCNC: 45 NG/ML (ref 30–96)
ABO + RH BLD: NORMAL
ALBUMIN SERPL BCP-MCNC: 3.3 G/DL (ref 3.5–5.2)
ALP SERPL-CCNC: 49 U/L (ref 55–135)
ALT SERPL W/O P-5'-P-CCNC: 15 U/L (ref 10–44)
ANION GAP SERPL CALC-SCNC: 20 MMOL/L (ref 8–16)
APPEARANCE FLD: CLEAR
APTT BLDCRRT: 21.9 SEC (ref 21–32)
AST SERPL-CCNC: 16 U/L (ref 10–40)
BASOPHILS # BLD AUTO: 0.07 K/UL (ref 0–0.2)
BASOPHILS NFR BLD: 0.5 % (ref 0–1.9)
BILIRUB SERPL-MCNC: 0.3 MG/DL (ref 0.1–1)
BLD GP AB SCN CELLS X3 SERPL QL: NORMAL
BODY FLD TYPE: NORMAL
BUN SERPL-MCNC: 42 MG/DL (ref 8–23)
CALCIUM SERPL-MCNC: 8.9 MG/DL (ref 8.7–10.5)
CHLORIDE SERPL-SCNC: 97 MMOL/L (ref 95–110)
CHOLEST SERPL-MCNC: 156 MG/DL (ref 120–199)
CHOLEST/HDLC SERPL: 4.2 {RATIO} (ref 2–5)
CO2 SERPL-SCNC: 22 MMOL/L (ref 23–29)
COLOR FLD: COLORLESS
CREAT SERPL-MCNC: 11.3 MG/DL (ref 0.5–1.4)
CREAT UR-MCNC: 150 MG/DL (ref 15–325)
DIFFERENTIAL METHOD: ABNORMAL
EOSINOPHIL # BLD AUTO: 0.4 K/UL (ref 0–0.5)
EOSINOPHIL NFR BLD: 3.1 % (ref 0–8)
EOSINOPHIL NFR FLD MANUAL: 1 %
ERYTHROCYTE [DISTWIDTH] IN BLOOD BY AUTOMATED COUNT: 13.1 % (ref 11.5–14.5)
EST. GFR  (AFRICAN AMERICAN): 3.5 ML/MIN/1.73 M^2
EST. GFR  (NON AFRICAN AMERICAN): 3 ML/MIN/1.73 M^2
GLUCOSE SERPL-MCNC: 164 MG/DL (ref 70–110)
HBV CORE IGM SERPL QL IA: NEGATIVE
HBV SURFACE AG SERPL QL IA: NEGATIVE
HCT VFR BLD AUTO: 24.4 % (ref 37–48.5)
HDLC SERPL-MCNC: 37 MG/DL (ref 40–75)
HDLC SERPL: 23.7 % (ref 20–50)
HGB BLD-MCNC: 7.9 G/DL (ref 12–16)
HIV 1+2 AB+HIV1 P24 AG SERPL QL IA: NEGATIVE
IMM GRANULOCYTES # BLD AUTO: 0.13 K/UL (ref 0–0.04)
IMM GRANULOCYTES NFR BLD AUTO: 0.9 % (ref 0–0.5)
INR PPP: 1 (ref 0.8–1.2)
LDH SERPL L TO P-CCNC: 234 U/L (ref 110–260)
LDLC SERPL CALC-MCNC: 85.4 MG/DL (ref 63–159)
LYMPHOCYTES # BLD AUTO: 3.1 K/UL (ref 1–4.8)
LYMPHOCYTES NFR BLD: 21.9 % (ref 18–48)
LYMPHOCYTES NFR FLD MANUAL: 5 %
MCH RBC QN AUTO: 28.8 PG (ref 27–31)
MCHC RBC AUTO-ENTMCNC: 32.4 G/DL (ref 32–36)
MCV RBC AUTO: 89 FL (ref 82–98)
MESOTHL CELL NFR FLD MANUAL: 2 %
MONOCYTES # BLD AUTO: 1.9 K/UL (ref 0.3–1)
MONOCYTES NFR BLD: 13.3 % (ref 4–15)
MONOS+MACROS NFR FLD MANUAL: 90 %
NEUTROPHILS # BLD AUTO: 8.4 K/UL (ref 1.8–7.7)
NEUTROPHILS NFR BLD: 60.3 % (ref 38–73)
NEUTROPHILS NFR FLD MANUAL: 2 %
NONHDLC SERPL-MCNC: 119 MG/DL
NRBC BLD-RTO: 0 /100 WBC
PHOSPHATE SERPL-MCNC: 5.1 MG/DL (ref 2.7–4.5)
PLATELET # BLD AUTO: 266 K/UL (ref 150–350)
PMV BLD AUTO: 9.1 FL (ref 9.2–12.9)
POCT GLUCOSE: 175 MG/DL (ref 70–110)
POTASSIUM SERPL-SCNC: 3.3 MMOL/L (ref 3.5–5.1)
PROT SERPL-MCNC: 6.9 G/DL (ref 6–8.4)
PROT UR-MCNC: 374 MG/DL (ref 0–15)
PROT/CREAT UR: 2.49 MG/G{CREAT} (ref 0–0.2)
PROTHROMBIN TIME: 10.9 SEC (ref 9–12.5)
PTH-INTACT SERPL-MCNC: 737 PG/ML (ref 9–77)
RBC # BLD AUTO: 2.74 M/UL (ref 4–5.4)
SARS-COV-2 RDRP RESP QL NAA+PROBE: POSITIVE
SODIUM SERPL-SCNC: 139 MMOL/L (ref 136–145)
TRIGL SERPL-MCNC: 168 MG/DL (ref 30–150)
URATE SERPL-MCNC: 7.8 MG/DL (ref 2.4–5.7)
WBC # BLD AUTO: 13.97 K/UL (ref 3.9–12.7)
WBC # FLD: 7 /CU MM

## 2021-02-08 PROCEDURE — 85730 THROMBOPLASTIN TIME PARTIAL: CPT | Mod: NTX

## 2021-02-08 PROCEDURE — 87340 HEPATITIS B SURFACE AG IA: CPT | Mod: NTX

## 2021-02-08 PROCEDURE — 27200950 HC CAPD SUPPORT: Mod: NTX

## 2021-02-08 PROCEDURE — 84156 ASSAY OF PROTEIN URINE: CPT | Mod: NTX

## 2021-02-08 PROCEDURE — 20600001 HC STEP DOWN PRIVATE ROOM: Mod: NTX

## 2021-02-08 PROCEDURE — 93010 ELECTROCARDIOGRAM REPORT: CPT | Mod: NTX,,, | Performed by: INTERNAL MEDICINE

## 2021-02-08 PROCEDURE — 82306 VITAMIN D 25 HYDROXY: CPT | Mod: NTX

## 2021-02-08 PROCEDURE — 83615 LACTATE (LD) (LDH) ENZYME: CPT | Mod: NTX

## 2021-02-08 PROCEDURE — 84100 ASSAY OF PHOSPHORUS: CPT | Mod: NTX

## 2021-02-08 PROCEDURE — 85025 COMPLETE CBC W/AUTO DIFF WBC: CPT | Mod: NTX

## 2021-02-08 PROCEDURE — 89051 BODY FLUID CELL COUNT: CPT | Mod: NTX

## 2021-02-08 PROCEDURE — 80061 LIPID PANEL: CPT | Mod: NTX

## 2021-02-08 PROCEDURE — 36415 COLL VENOUS BLD VENIPUNCTURE: CPT | Mod: NTX

## 2021-02-08 PROCEDURE — 87075 CULTR BACTERIA EXCEPT BLOOD: CPT | Mod: NTX

## 2021-02-08 PROCEDURE — 93005 ELECTROCARDIOGRAM TRACING: CPT | Mod: NTX

## 2021-02-08 PROCEDURE — 83970 ASSAY OF PARATHORMONE: CPT | Mod: NTX

## 2021-02-08 PROCEDURE — 99239 HOSP IP/OBS DSCHRG MGMT >30: CPT | Mod: NTX,,, | Performed by: NURSE PRACTITIONER

## 2021-02-08 PROCEDURE — 99223 PR INITIAL HOSPITAL CARE,LEVL III: ICD-10-PCS | Mod: NTX,,, | Performed by: NURSE PRACTITIONER

## 2021-02-08 PROCEDURE — 87522 HEPATITIS C REVRS TRNSCRPJ: CPT | Mod: NTX

## 2021-02-08 PROCEDURE — 80053 COMPREHEN METABOLIC PANEL: CPT | Mod: NTX

## 2021-02-08 PROCEDURE — 99239 PR HOSPITAL DISCHARGE DAY,>30 MIN: ICD-10-PCS | Mod: NTX,,, | Performed by: NURSE PRACTITIONER

## 2021-02-08 PROCEDURE — 86706 HEP B SURFACE ANTIBODY: CPT | Mod: NTX

## 2021-02-08 PROCEDURE — U0002 COVID-19 LAB TEST NON-CDC: HCPCS | Mod: NTX

## 2021-02-08 PROCEDURE — 86900 BLOOD TYPING SEROLOGIC ABO: CPT | Mod: NTX

## 2021-02-08 PROCEDURE — 99223 1ST HOSP IP/OBS HIGH 75: CPT | Mod: NTX,,, | Performed by: NURSE PRACTITIONER

## 2021-02-08 PROCEDURE — 86703 HIV-1/HIV-2 1 RESULT ANTBDY: CPT | Mod: NTX

## 2021-02-08 PROCEDURE — 84550 ASSAY OF BLOOD/URIC ACID: CPT | Mod: NTX

## 2021-02-08 PROCEDURE — 87070 CULTURE OTHR SPECIMN AEROBIC: CPT | Mod: NTX

## 2021-02-08 PROCEDURE — 63600175 PHARM REV CODE 636 W HCPCS: Mod: NTX | Performed by: NURSE PRACTITIONER

## 2021-02-08 PROCEDURE — 86705 HEP B CORE ANTIBODY IGM: CPT | Mod: NTX

## 2021-02-08 PROCEDURE — 87205 SMEAR GRAM STAIN: CPT | Mod: NTX

## 2021-02-08 PROCEDURE — 93010 EKG 12-LEAD: ICD-10-PCS | Mod: NTX,,, | Performed by: INTERNAL MEDICINE

## 2021-02-08 PROCEDURE — 85610 PROTHROMBIN TIME: CPT | Mod: NTX

## 2021-02-08 RX ORDER — PREGABALIN 75 MG/1
75 CAPSULE ORAL
Status: DISCONTINUED | OUTPATIENT
Start: 2021-02-08 | End: 2021-02-08 | Stop reason: HOSPADM

## 2021-02-08 RX ORDER — MUPIROCIN 20 MG/G
OINTMENT TOPICAL
Status: DISCONTINUED | OUTPATIENT
Start: 2021-02-08 | End: 2021-02-08 | Stop reason: HOSPADM

## 2021-02-08 RX ORDER — CEFAZOLIN SODIUM 1 G/3ML
2 INJECTION, POWDER, FOR SOLUTION INTRAMUSCULAR; INTRAVENOUS
Status: DISCONTINUED | OUTPATIENT
Start: 2021-02-08 | End: 2021-02-08 | Stop reason: HOSPADM

## 2021-02-08 RX ORDER — HEPARIN SODIUM 5000 [USP'U]/ML
5000 INJECTION, SOLUTION INTRAVENOUS; SUBCUTANEOUS ONCE
Status: COMPLETED | OUTPATIENT
Start: 2021-02-08 | End: 2021-02-08

## 2021-02-08 RX ADMIN — HEPARIN SODIUM 5000 UNITS: 5000 INJECTION INTRAVENOUS; SUBCUTANEOUS at 03:02

## 2021-02-10 LAB
HBV SURFACE AB SER QL IA: POSITIVE
HBV SURFACE AB SERPL IA-ACNC: 489 MIU/ML
HCV RNA SERPL NAA+PROBE-LOG IU: <1.08 LOG (10) IU/ML
HCV RNA SERPL QL NAA+PROBE: NOT DETECTED IU/ML
HCV RNA SPEC NAA+PROBE-ACNC: <12 IU/ML

## 2021-02-12 LAB
BACTERIA FLD AEROBE CULT: NO GROWTH
GRAM STN SPEC: NORMAL
GRAM STN SPEC: NORMAL

## 2021-02-15 LAB — BACTERIA SPEC ANAEROBE CULT: NORMAL

## 2021-02-19 ENCOUNTER — TELEPHONE (OUTPATIENT)
Dept: TRANSPLANT | Facility: CLINIC | Age: 71
End: 2021-02-19

## 2021-02-26 ENCOUNTER — OFFICE VISIT (OUTPATIENT)
Dept: OBSTETRICS AND GYNECOLOGY | Facility: CLINIC | Age: 71
End: 2021-02-26
Payer: MEDICARE

## 2021-02-26 VITALS
HEIGHT: 63 IN | DIASTOLIC BLOOD PRESSURE: 80 MMHG | WEIGHT: 148.38 LBS | SYSTOLIC BLOOD PRESSURE: 140 MMHG | BODY MASS INDEX: 26.29 KG/M2

## 2021-02-26 DIAGNOSIS — Z91.89 GYN EXAM FOR HIGH-RISK MEDICARE PATIENT: Primary | ICD-10-CM

## 2021-02-26 PROCEDURE — 1126F AMNT PAIN NOTED NONE PRSNT: CPT | Mod: S$GLB,,, | Performed by: NURSE PRACTITIONER

## 2021-02-26 PROCEDURE — 99999 PR PBB SHADOW E&M-EST. PATIENT-LVL IV: ICD-10-PCS | Mod: PBBFAC,,, | Performed by: NURSE PRACTITIONER

## 2021-02-26 PROCEDURE — 1126F PR PAIN SEVERITY QUANTIFIED, NO PAIN PRESENT: ICD-10-PCS | Mod: S$GLB,,, | Performed by: NURSE PRACTITIONER

## 2021-02-26 PROCEDURE — G0101 CA SCREEN;PELVIC/BREAST EXAM: HCPCS | Mod: S$GLB,,, | Performed by: NURSE PRACTITIONER

## 2021-02-26 PROCEDURE — 99999 PR PBB SHADOW E&M-EST. PATIENT-LVL IV: CPT | Mod: PBBFAC,,, | Performed by: NURSE PRACTITIONER

## 2021-02-26 PROCEDURE — G0101 PR CA SCREEN;PELVIC/BREAST EXAM: ICD-10-PCS | Mod: S$GLB,,, | Performed by: NURSE PRACTITIONER

## 2021-02-26 PROCEDURE — 3008F BODY MASS INDEX DOCD: CPT | Mod: CPTII,S$GLB,, | Performed by: NURSE PRACTITIONER

## 2021-02-26 PROCEDURE — 3008F PR BODY MASS INDEX (BMI) DOCUMENTED: ICD-10-PCS | Mod: CPTII,S$GLB,, | Performed by: NURSE PRACTITIONER

## 2021-02-26 RX ORDER — LANCETS
EACH MISCELLANEOUS
COMMUNITY
Start: 2020-12-14

## 2021-02-26 RX ORDER — BLOOD SUGAR DIAGNOSTIC
STRIP MISCELLANEOUS
COMMUNITY
Start: 2020-12-14 | End: 2021-07-15 | Stop reason: SDUPTHER

## 2021-03-24 ENCOUNTER — TELEPHONE (OUTPATIENT)
Dept: TRANSPLANT | Facility: HOSPITAL | Age: 71
End: 2021-03-24

## 2021-03-24 ENCOUNTER — TELEPHONE (OUTPATIENT)
Dept: TRANSPLANT | Facility: CLINIC | Age: 71
End: 2021-03-24

## 2021-03-24 DIAGNOSIS — Z76.82 AWAITING ORGAN TRANSPLANT STATUS: Primary | ICD-10-CM

## 2021-03-25 ENCOUNTER — ANESTHESIA (OUTPATIENT)
Dept: SURGERY | Facility: HOSPITAL | Age: 71
DRG: 652 | End: 2021-03-25
Payer: MEDICARE

## 2021-03-25 ENCOUNTER — ANESTHESIA EVENT (OUTPATIENT)
Dept: SURGERY | Facility: HOSPITAL | Age: 71
DRG: 652 | End: 2021-03-25
Payer: MEDICARE

## 2021-03-25 ENCOUNTER — HOSPITAL ENCOUNTER (INPATIENT)
Facility: HOSPITAL | Age: 71
LOS: 4 days | Discharge: HOME HEALTH | DRG: 652 | End: 2021-03-29
Attending: TRANSPLANT SURGERY | Admitting: TRANSPLANT SURGERY
Payer: MEDICARE

## 2021-03-25 DIAGNOSIS — Z94.0 S/P KIDNEY TRANSPLANT: Primary | ICD-10-CM

## 2021-03-25 DIAGNOSIS — Z79.01 CURRENT USE OF LONG TERM ANTICOAGULATION: Chronic | ICD-10-CM

## 2021-03-25 DIAGNOSIS — Z01.818 PRE-OP TESTING: ICD-10-CM

## 2021-03-25 DIAGNOSIS — I10 ESSENTIAL HYPERTENSION: Chronic | ICD-10-CM

## 2021-03-25 DIAGNOSIS — N18.6 ESRD (END STAGE RENAL DISEASE): ICD-10-CM

## 2021-03-25 DIAGNOSIS — Z29.89 PROPHYLACTIC IMMUNOTHERAPY: ICD-10-CM

## 2021-03-25 DIAGNOSIS — D62 ACUTE BLOOD LOSS ANEMIA: ICD-10-CM

## 2021-03-25 DIAGNOSIS — N18.6 ESRD ON PERITONEAL DIALYSIS: Chronic | ICD-10-CM

## 2021-03-25 DIAGNOSIS — G40.802 EPILEPSY WITH BOTH GENERALIZED AND FOCAL FEATURES: ICD-10-CM

## 2021-03-25 DIAGNOSIS — D63.8 ANEMIA OF CHRONIC DISEASE: ICD-10-CM

## 2021-03-25 DIAGNOSIS — Z99.2 ESRD ON PERITONEAL DIALYSIS: Chronic | ICD-10-CM

## 2021-03-25 DIAGNOSIS — Z79.60 LONG-TERM USE OF IMMUNOSUPPRESSANT MEDICATION: ICD-10-CM

## 2021-03-25 PROBLEM — T38.0X5A ADRENAL CORTICAL STEROIDS CAUSING ADVERSE EFFECT IN THERAPEUTIC USE: Status: ACTIVE | Noted: 2021-03-25

## 2021-03-25 LAB
25(OH)D3+25(OH)D2 SERPL-MCNC: 44 NG/ML (ref 30–96)
ABO + RH BLD: NORMAL
ALBUMIN SERPL BCP-MCNC: 2.8 G/DL (ref 3.5–5.2)
ALBUMIN SERPL BCP-MCNC: 2.9 G/DL (ref 3.5–5.2)
ALBUMIN SERPL BCP-MCNC: 3.1 G/DL (ref 3.5–5.2)
ALP SERPL-CCNC: 63 U/L (ref 55–135)
ALT SERPL W/O P-5'-P-CCNC: 16 U/L (ref 10–44)
ANION GAP SERPL CALC-SCNC: 15 MMOL/L (ref 8–16)
ANION GAP SERPL CALC-SCNC: 22 MMOL/L (ref 8–16)
ANION GAP SERPL CALC-SCNC: 23 MMOL/L (ref 8–16)
ANISOCYTOSIS BLD QL SMEAR: SLIGHT
APPEARANCE FLD: CLEAR
APTT BLDCRRT: <21 SEC (ref 21–32)
AST SERPL-CCNC: 15 U/L (ref 10–40)
BASOPHILS # BLD AUTO: 0.04 K/UL (ref 0–0.2)
BASOPHILS # BLD AUTO: 0.12 K/UL (ref 0–0.2)
BASOPHILS NFR BLD: 0.2 % (ref 0–1.9)
BASOPHILS NFR BLD: 0.9 % (ref 0–1.9)
BILIRUB SERPL-MCNC: 0.3 MG/DL (ref 0.1–1)
BLD GP AB SCN CELLS X3 SERPL QL: NORMAL
BODY FLD TYPE: NORMAL
BUN SERPL-MCNC: 47 MG/DL (ref 8–23)
BUN SERPL-MCNC: 48 MG/DL (ref 8–23)
BUN SERPL-MCNC: 48 MG/DL (ref 8–23)
CALCIUM SERPL-MCNC: 8.8 MG/DL (ref 8.7–10.5)
CALCIUM SERPL-MCNC: 8.9 MG/DL (ref 8.7–10.5)
CALCIUM SERPL-MCNC: 9.7 MG/DL (ref 8.7–10.5)
CHLORIDE SERPL-SCNC: 102 MMOL/L (ref 95–110)
CHLORIDE SERPL-SCNC: 98 MMOL/L (ref 95–110)
CHLORIDE SERPL-SCNC: 99 MMOL/L (ref 95–110)
CHOLEST SERPL-MCNC: 153 MG/DL (ref 120–199)
CHOLEST/HDLC SERPL: 4.1 {RATIO} (ref 2–5)
CO2 SERPL-SCNC: 17 MMOL/L (ref 23–29)
CO2 SERPL-SCNC: 18 MMOL/L (ref 23–29)
CO2 SERPL-SCNC: 24 MMOL/L (ref 23–29)
COLOR FLD: COLORLESS
CREAT SERPL-MCNC: 10.5 MG/DL (ref 0.5–1.4)
CREAT SERPL-MCNC: 8.6 MG/DL (ref 0.5–1.4)
CREAT SERPL-MCNC: 9.8 MG/DL (ref 0.5–1.4)
CREAT UR-MCNC: 95 MG/DL (ref 15–325)
DACRYOCYTES BLD QL SMEAR: ABNORMAL
DIFFERENTIAL METHOD: ABNORMAL
DIFFERENTIAL METHOD: ABNORMAL
EOSINOPHIL # BLD AUTO: 0 K/UL (ref 0–0.5)
EOSINOPHIL # BLD AUTO: 0.4 K/UL (ref 0–0.5)
EOSINOPHIL NFR BLD: 0 % (ref 0–8)
EOSINOPHIL NFR BLD: 3.3 % (ref 0–8)
ERYTHROCYTE [DISTWIDTH] IN BLOOD BY AUTOMATED COUNT: 14.6 % (ref 11.5–14.5)
ERYTHROCYTE [DISTWIDTH] IN BLOOD BY AUTOMATED COUNT: 14.9 % (ref 11.5–14.5)
EST. GFR  (AFRICAN AMERICAN): 3.8 ML/MIN/1.73 M^2
EST. GFR  (AFRICAN AMERICAN): 4.1 ML/MIN/1.73 M^2
EST. GFR  (AFRICAN AMERICAN): 4.8 ML/MIN/1.73 M^2
EST. GFR  (NON AFRICAN AMERICAN): 3.3 ML/MIN/1.73 M^2
EST. GFR  (NON AFRICAN AMERICAN): 3.6 ML/MIN/1.73 M^2
EST. GFR  (NON AFRICAN AMERICAN): 4.2 ML/MIN/1.73 M^2
GLUCOSE SERPL-MCNC: 148 MG/DL (ref 70–110)
GLUCOSE SERPL-MCNC: 305 MG/DL (ref 70–110)
GLUCOSE SERPL-MCNC: 378 MG/DL (ref 70–110)
HBV CORE AB SERPL QL IA: NEGATIVE
HBV CORE IGM SERPL QL IA: NEGATIVE
HBV SURFACE AG SERPL QL IA: NEGATIVE
HCT VFR BLD AUTO: 27.8 % (ref 37–48.5)
HCT VFR BLD AUTO: 29 % (ref 37–48.5)
HCT VFR BLD AUTO: 29.1 % (ref 37–48.5)
HCV AB SERPL QL IA: NEGATIVE
HDLC SERPL-MCNC: 37 MG/DL (ref 40–75)
HDLC SERPL: 24.2 % (ref 20–50)
HGB BLD-MCNC: 8.8 G/DL (ref 12–16)
HGB BLD-MCNC: 9.7 G/DL (ref 12–16)
HIV 1+2 AB+HIV1 P24 AG SERPL QL IA: NEGATIVE
HOWELL-JOLLY BOD BLD QL SMEAR: ABNORMAL
HYPOCHROMIA BLD QL SMEAR: ABNORMAL
IMM GRANULOCYTES # BLD AUTO: 0.18 K/UL (ref 0–0.04)
IMM GRANULOCYTES # BLD AUTO: 0.33 K/UL (ref 0–0.04)
IMM GRANULOCYTES NFR BLD AUTO: 1.3 % (ref 0–0.5)
IMM GRANULOCYTES NFR BLD AUTO: 1.4 % (ref 0–0.5)
INR PPP: 0.9 (ref 0.8–1.2)
LDH SERPL L TO P-CCNC: 260 U/L (ref 110–260)
LDLC SERPL CALC-MCNC: 89.8 MG/DL (ref 63–159)
LYMPHOCYTES # BLD AUTO: 0.9 K/UL (ref 1–4.8)
LYMPHOCYTES # BLD AUTO: 2.8 K/UL (ref 1–4.8)
LYMPHOCYTES NFR BLD: 20.9 % (ref 18–48)
LYMPHOCYTES NFR BLD: 3.5 % (ref 18–48)
LYMPHOCYTES NFR FLD MANUAL: 100 %
MCH RBC QN AUTO: 29.9 PG (ref 27–31)
MCH RBC QN AUTO: 30 PG (ref 27–31)
MCHC RBC AUTO-ENTMCNC: 31.7 G/DL (ref 32–36)
MCHC RBC AUTO-ENTMCNC: 33.4 G/DL (ref 32–36)
MCV RBC AUTO: 90 FL (ref 82–98)
MCV RBC AUTO: 95 FL (ref 82–98)
MONOCYTES # BLD AUTO: 0.5 K/UL (ref 0.3–1)
MONOCYTES # BLD AUTO: 1.6 K/UL (ref 0.3–1)
MONOCYTES NFR BLD: 11.6 % (ref 4–15)
MONOCYTES NFR BLD: 2.1 % (ref 4–15)
NEUTROPHILS # BLD AUTO: 23 K/UL (ref 1.8–7.7)
NEUTROPHILS # BLD AUTO: 8.3 K/UL (ref 1.8–7.7)
NEUTROPHILS NFR BLD: 61.9 % (ref 38–73)
NEUTROPHILS NFR BLD: 92.9 % (ref 38–73)
NONHDLC SERPL-MCNC: 116 MG/DL
NRBC BLD-RTO: 0 /100 WBC
NRBC BLD-RTO: 0 /100 WBC
OVALOCYTES BLD QL SMEAR: ABNORMAL
PHOSPHATE SERPL-MCNC: 4.9 MG/DL (ref 2.7–4.5)
PHOSPHATE SERPL-MCNC: 5.9 MG/DL (ref 2.7–4.5)
PHOSPHATE SERPL-MCNC: 6.3 MG/DL (ref 2.7–4.5)
PLATELET # BLD AUTO: 277 K/UL (ref 150–350)
PLATELET # BLD AUTO: 282 K/UL (ref 150–350)
PLATELET BLD QL SMEAR: ABNORMAL
PMV BLD AUTO: 8.8 FL (ref 9.2–12.9)
PMV BLD AUTO: 9.2 FL (ref 9.2–12.9)
POCT GLUCOSE: 179 MG/DL (ref 70–110)
POCT GLUCOSE: 324 MG/DL (ref 70–110)
POCT GLUCOSE: 334 MG/DL (ref 70–110)
POCT GLUCOSE: 382 MG/DL (ref 70–110)
POIKILOCYTOSIS BLD QL SMEAR: SLIGHT
POLYCHROMASIA BLD QL SMEAR: ABNORMAL
POTASSIUM SERPL-SCNC: 3.8 MMOL/L (ref 3.5–5.1)
POTASSIUM SERPL-SCNC: 4.2 MMOL/L (ref 3.5–5.1)
POTASSIUM SERPL-SCNC: 4.2 MMOL/L (ref 3.5–5.1)
PROT SERPL-MCNC: 6.7 G/DL (ref 6–8.4)
PROT UR-MCNC: 384 MG/DL (ref 0–15)
PROT/CREAT UR: 4.04 MG/G{CREAT} (ref 0–0.2)
PROTHROMBIN TIME: 10.3 SEC (ref 9–12.5)
PTH-INTACT SERPL-MCNC: 295 PG/ML (ref 9–77)
RBC # BLD AUTO: 2.94 M/UL (ref 4–5.4)
RBC # BLD AUTO: 3.23 M/UL (ref 4–5.4)
SODIUM SERPL-SCNC: 138 MMOL/L (ref 136–145)
SODIUM SERPL-SCNC: 139 MMOL/L (ref 136–145)
SODIUM SERPL-SCNC: 141 MMOL/L (ref 136–145)
TRIGL SERPL-MCNC: 131 MG/DL (ref 30–150)
URATE SERPL-MCNC: 7.1 MG/DL (ref 2.4–5.7)
WBC # BLD AUTO: 13.33 K/UL (ref 3.9–12.7)
WBC # BLD AUTO: 24.79 K/UL (ref 3.9–12.7)
WBC # FLD: 2 /CU MM

## 2021-03-25 PROCEDURE — 36000931 HC OR TIME LEV VII EA ADD 15 MIN: Performed by: TRANSPLANT SURGERY

## 2021-03-25 PROCEDURE — 63600175 PHARM REV CODE 636 W HCPCS: Performed by: STUDENT IN AN ORGANIZED HEALTH CARE EDUCATION/TRAINING PROGRAM

## 2021-03-25 PROCEDURE — 85610 PROTHROMBIN TIME: CPT | Performed by: PHYSICIAN ASSISTANT

## 2021-03-25 PROCEDURE — 81200001 HC KIDNEY ACQUISITION - CADAVER

## 2021-03-25 PROCEDURE — 82962 GLUCOSE BLOOD TEST: CPT | Performed by: TRANSPLANT SURGERY

## 2021-03-25 PROCEDURE — 87075 CULTR BACTERIA EXCEPT BLOOD: CPT | Performed by: PHYSICIAN ASSISTANT

## 2021-03-25 PROCEDURE — 63600175 PHARM REV CODE 636 W HCPCS: Performed by: TRANSPLANT SURGERY

## 2021-03-25 PROCEDURE — 86703 HIV-1/HIV-2 1 RESULT ANTBDY: CPT | Performed by: PHYSICIAN ASSISTANT

## 2021-03-25 PROCEDURE — 50360 PR TRANSPLANTATION OF KIDNEY: ICD-10-PCS | Mod: LT,,, | Performed by: TRANSPLANT SURGERY

## 2021-03-25 PROCEDURE — 49422 PR REMOVAL TUNNELED INTRAPERITONEAL CATHETER: ICD-10-PCS | Mod: 51,,, | Performed by: TRANSPLANT SURGERY

## 2021-03-25 PROCEDURE — 25000003 PHARM REV CODE 250: Performed by: STUDENT IN AN ORGANIZED HEALTH CARE EDUCATION/TRAINING PROGRAM

## 2021-03-25 PROCEDURE — 36000930 HC OR TIME LEV VII 1ST 15 MIN: Performed by: TRANSPLANT SURGERY

## 2021-03-25 PROCEDURE — 63600175 PHARM REV CODE 636 W HCPCS: Performed by: NURSE ANESTHETIST, CERTIFIED REGISTERED

## 2021-03-25 PROCEDURE — 86705 HEP B CORE ANTIBODY IGM: CPT | Performed by: PHYSICIAN ASSISTANT

## 2021-03-25 PROCEDURE — 50360 RNL ALTRNSPLJ W/O RCP NFRCT: CPT | Mod: LT,,, | Performed by: TRANSPLANT SURGERY

## 2021-03-25 PROCEDURE — 80053 COMPREHEN METABOLIC PANEL: CPT | Performed by: PHYSICIAN ASSISTANT

## 2021-03-25 PROCEDURE — 20600001 HC STEP DOWN PRIVATE ROOM

## 2021-03-25 PROCEDURE — 49422 REMOVE TUNNELED IP CATH: CPT | Mod: 51,,, | Performed by: TRANSPLANT SURGERY

## 2021-03-25 PROCEDURE — 94761 N-INVAS EAR/PLS OXIMETRY MLT: CPT

## 2021-03-25 PROCEDURE — 25000003 PHARM REV CODE 250: Performed by: TRANSPLANT SURGERY

## 2021-03-25 PROCEDURE — D9220A PRA ANESTHESIA: Mod: CRNA,,, | Performed by: NURSE ANESTHETIST, CERTIFIED REGISTERED

## 2021-03-25 PROCEDURE — 87340 HEPATITIS B SURFACE AG IA: CPT | Performed by: PHYSICIAN ASSISTANT

## 2021-03-25 PROCEDURE — 99222 1ST HOSP IP/OBS MODERATE 55: CPT | Mod: ,,, | Performed by: NURSE PRACTITIONER

## 2021-03-25 PROCEDURE — 27201423 OPTIME MED/SURG SUP & DEVICES STERILE SUPPLY: Performed by: TRANSPLANT SURGERY

## 2021-03-25 PROCEDURE — 36415 COLL VENOUS BLD VENIPUNCTURE: CPT | Performed by: PHYSICIAN ASSISTANT

## 2021-03-25 PROCEDURE — 89051 BODY FLUID CELL COUNT: CPT | Performed by: PHYSICIAN ASSISTANT

## 2021-03-25 PROCEDURE — 36620 INSERTION CATHETER ARTERY: CPT | Mod: 59,,, | Performed by: STUDENT IN AN ORGANIZED HEALTH CARE EDUCATION/TRAINING PROGRAM

## 2021-03-25 PROCEDURE — 80069 RENAL FUNCTION PANEL: CPT | Mod: 91 | Performed by: TRANSPLANT SURGERY

## 2021-03-25 PROCEDURE — 63600175 PHARM REV CODE 636 W HCPCS: Performed by: NURSE PRACTITIONER

## 2021-03-25 PROCEDURE — 85025 COMPLETE CBC W/AUTO DIFF WBC: CPT | Performed by: PHYSICIAN ASSISTANT

## 2021-03-25 PROCEDURE — 50605 PR URETEROTOMY TO INSERT STENT: ICD-10-PCS | Mod: 51,LT,, | Performed by: TRANSPLANT SURGERY

## 2021-03-25 PROCEDURE — 93005 ELECTROCARDIOGRAM TRACING: CPT | Mod: NTX

## 2021-03-25 PROCEDURE — 84156 ASSAY OF PROTEIN URINE: CPT | Performed by: PHYSICIAN ASSISTANT

## 2021-03-25 PROCEDURE — 99900035 HC TECH TIME PER 15 MIN (STAT)

## 2021-03-25 PROCEDURE — 80069 RENAL FUNCTION PANEL: CPT | Performed by: STUDENT IN AN ORGANIZED HEALTH CARE EDUCATION/TRAINING PROGRAM

## 2021-03-25 PROCEDURE — 80061 LIPID PANEL: CPT | Performed by: PHYSICIAN ASSISTANT

## 2021-03-25 PROCEDURE — 63600175 PHARM REV CODE 636 W HCPCS: Mod: NTX | Performed by: PHYSICIAN ASSISTANT

## 2021-03-25 PROCEDURE — 85014 HEMATOCRIT: CPT | Performed by: STUDENT IN AN ORGANIZED HEALTH CARE EDUCATION/TRAINING PROGRAM

## 2021-03-25 PROCEDURE — 86803 HEPATITIS C AB TEST: CPT | Performed by: PHYSICIAN ASSISTANT

## 2021-03-25 PROCEDURE — 36620 PR INSERT CATH,ART,PERCUT,SHORTTERM: ICD-10-PCS | Mod: 59,,, | Performed by: STUDENT IN AN ORGANIZED HEALTH CARE EDUCATION/TRAINING PROGRAM

## 2021-03-25 PROCEDURE — 99223 PR INITIAL HOSPITAL CARE,LEVL III: ICD-10-PCS | Mod: AI,NTX,, | Performed by: PHYSICIAN ASSISTANT

## 2021-03-25 PROCEDURE — 27200950 HC CAPD SUPPORT: Mod: NTX

## 2021-03-25 PROCEDURE — 71000033 HC RECOVERY, INTIAL HOUR: Performed by: TRANSPLANT SURGERY

## 2021-03-25 PROCEDURE — 87070 CULTURE OTHR SPECIMN AEROBIC: CPT | Performed by: PHYSICIAN ASSISTANT

## 2021-03-25 PROCEDURE — 25000003 PHARM REV CODE 250: Performed by: NURSE ANESTHETIST, CERTIFIED REGISTERED

## 2021-03-25 PROCEDURE — 84100 ASSAY OF PHOSPHORUS: CPT | Performed by: PHYSICIAN ASSISTANT

## 2021-03-25 PROCEDURE — 36415 COLL VENOUS BLD VENIPUNCTURE: CPT | Performed by: TRANSPLANT SURGERY

## 2021-03-25 PROCEDURE — 71000016 HC POSTOP RECOV ADDL HR: Performed by: TRANSPLANT SURGERY

## 2021-03-25 PROCEDURE — 86706 HEP B SURFACE ANTIBODY: CPT | Performed by: PHYSICIAN ASSISTANT

## 2021-03-25 PROCEDURE — 83970 ASSAY OF PARATHORMONE: CPT | Performed by: PHYSICIAN ASSISTANT

## 2021-03-25 PROCEDURE — 87205 SMEAR GRAM STAIN: CPT | Performed by: PHYSICIAN ASSISTANT

## 2021-03-25 PROCEDURE — 93010 EKG 12-LEAD: ICD-10-PCS | Mod: NTX,,, | Performed by: INTERNAL MEDICINE

## 2021-03-25 PROCEDURE — C2617 STENT, NON-COR, TEM W/O DEL: HCPCS | Performed by: TRANSPLANT SURGERY

## 2021-03-25 PROCEDURE — 84550 ASSAY OF BLOOD/URIC ACID: CPT | Performed by: PHYSICIAN ASSISTANT

## 2021-03-25 PROCEDURE — 50605 INSERT URETERAL SUPPORT: CPT | Mod: 51,LT,, | Performed by: TRANSPLANT SURGERY

## 2021-03-25 PROCEDURE — 85025 COMPLETE CBC W/AUTO DIFF WBC: CPT | Mod: 91 | Performed by: PHYSICIAN ASSISTANT

## 2021-03-25 PROCEDURE — 85730 THROMBOPLASTIN TIME PARTIAL: CPT | Performed by: PHYSICIAN ASSISTANT

## 2021-03-25 PROCEDURE — 93010 ELECTROCARDIOGRAM REPORT: CPT | Mod: NTX,,, | Performed by: INTERNAL MEDICINE

## 2021-03-25 PROCEDURE — 50323 PR TRANSPLANT,PREP CADAVER RENAL GRAFT: ICD-10-PCS | Mod: 51,LT,, | Performed by: TRANSPLANT SURGERY

## 2021-03-25 PROCEDURE — 25000003 PHARM REV CODE 250: Mod: NTX | Performed by: PHYSICIAN ASSISTANT

## 2021-03-25 PROCEDURE — 99223 1ST HOSP IP/OBS HIGH 75: CPT | Mod: AI,NTX,, | Performed by: PHYSICIAN ASSISTANT

## 2021-03-25 PROCEDURE — 81300002 HC KIDNEY TRANSPORT, GROUND 4-5 HOURS

## 2021-03-25 PROCEDURE — D9220A PRA ANESTHESIA: ICD-10-PCS | Mod: CRNA,,, | Performed by: NURSE ANESTHETIST, CERTIFIED REGISTERED

## 2021-03-25 PROCEDURE — 71000015 HC POSTOP RECOV 1ST HR: Performed by: TRANSPLANT SURGERY

## 2021-03-25 PROCEDURE — 86900 BLOOD TYPING SEROLOGIC ABO: CPT | Performed by: PHYSICIAN ASSISTANT

## 2021-03-25 PROCEDURE — 37000009 HC ANESTHESIA EA ADD 15 MINS: Performed by: TRANSPLANT SURGERY

## 2021-03-25 PROCEDURE — 83615 LACTATE (LD) (LDH) ENZYME: CPT | Performed by: PHYSICIAN ASSISTANT

## 2021-03-25 PROCEDURE — 87522 HEPATITIS C REVRS TRNSCRPJ: CPT | Performed by: PHYSICIAN ASSISTANT

## 2021-03-25 PROCEDURE — D9220A PRA ANESTHESIA: Mod: ANES,,, | Performed by: STUDENT IN AN ORGANIZED HEALTH CARE EDUCATION/TRAINING PROGRAM

## 2021-03-25 PROCEDURE — 94799 UNLISTED PULMONARY SVC/PX: CPT

## 2021-03-25 PROCEDURE — D9220A PRA ANESTHESIA: ICD-10-PCS | Mod: ANES,,, | Performed by: STUDENT IN AN ORGANIZED HEALTH CARE EDUCATION/TRAINING PROGRAM

## 2021-03-25 PROCEDURE — S5010 5% DEXTROSE AND 0.45% SALINE: HCPCS | Performed by: STUDENT IN AN ORGANIZED HEALTH CARE EDUCATION/TRAINING PROGRAM

## 2021-03-25 PROCEDURE — 27201037 HC PRESSURE MONITORING SET UP: Mod: NTX

## 2021-03-25 PROCEDURE — 86704 HEP B CORE ANTIBODY TOTAL: CPT | Performed by: PHYSICIAN ASSISTANT

## 2021-03-25 PROCEDURE — 37000008 HC ANESTHESIA 1ST 15 MINUTES: Performed by: TRANSPLANT SURGERY

## 2021-03-25 PROCEDURE — 99222 PR INITIAL HOSPITAL CARE,LEVL II: ICD-10-PCS | Mod: ,,, | Performed by: NURSE PRACTITIONER

## 2021-03-25 PROCEDURE — 82306 VITAMIN D 25 HYDROXY: CPT | Performed by: PHYSICIAN ASSISTANT

## 2021-03-25 DEVICE — STENT DOUBLE J 7FRX12CM
Type: IMPLANTABLE DEVICE | Site: URETER | Status: NON-FUNCTIONAL
Removed: 2021-04-21

## 2021-03-25 RX ORDER — SODIUM CHLORIDE 0.9 % (FLUSH) 0.9 %
10 SYRINGE (ML) INJECTION
Status: DISCONTINUED | OUTPATIENT
Start: 2021-03-25 | End: 2021-03-29 | Stop reason: HOSPADM

## 2021-03-25 RX ORDER — PROPOFOL 10 MG/ML
VIAL (ML) INTRAVENOUS
Status: DISCONTINUED | OUTPATIENT
Start: 2021-03-25 | End: 2021-03-25

## 2021-03-25 RX ORDER — EPHEDRINE SULFATE 50 MG/ML
INJECTION, SOLUTION INTRAVENOUS
Status: DISCONTINUED | OUTPATIENT
Start: 2021-03-25 | End: 2021-03-25

## 2021-03-25 RX ORDER — SODIUM CHLORIDE 9 MG/ML
INJECTION, SOLUTION INTRAVENOUS CONTINUOUS
Status: DISCONTINUED | OUTPATIENT
Start: 2021-03-25 | End: 2021-03-26

## 2021-03-25 RX ORDER — CEFAZOLIN SODIUM 1 G/3ML
2 INJECTION, POWDER, FOR SOLUTION INTRAMUSCULAR; INTRAVENOUS
Status: COMPLETED | OUTPATIENT
Start: 2021-03-25 | End: 2021-03-25

## 2021-03-25 RX ORDER — ONDANSETRON 2 MG/ML
4 INJECTION INTRAMUSCULAR; INTRAVENOUS DAILY PRN
Status: DISCONTINUED | OUTPATIENT
Start: 2021-03-25 | End: 2021-03-25 | Stop reason: HOSPADM

## 2021-03-25 RX ORDER — CEFAZOLIN SODIUM 1 G/3ML
INJECTION, POWDER, FOR SOLUTION INTRAMUSCULAR; INTRAVENOUS
Status: DISCONTINUED | OUTPATIENT
Start: 2021-03-25 | End: 2021-03-25 | Stop reason: HOSPADM

## 2021-03-25 RX ORDER — LEVOTHYROXINE SODIUM 100 UG/1
100 TABLET ORAL DAILY
Status: DISCONTINUED | OUTPATIENT
Start: 2021-03-25 | End: 2021-03-25

## 2021-03-25 RX ORDER — INSULIN ASPART 100 [IU]/ML
3-5 INJECTION, SOLUTION INTRAVENOUS; SUBCUTANEOUS
Status: DISCONTINUED | OUTPATIENT
Start: 2021-03-25 | End: 2021-03-26

## 2021-03-25 RX ORDER — FENTANYL CITRATE 50 UG/ML
INJECTION, SOLUTION INTRAMUSCULAR; INTRAVENOUS
Status: DISCONTINUED | OUTPATIENT
Start: 2021-03-25 | End: 2021-03-25

## 2021-03-25 RX ORDER — OXYCODONE HYDROCHLORIDE 5 MG/1
5 TABLET ORAL EVERY 6 HOURS PRN
Status: DISCONTINUED | OUTPATIENT
Start: 2021-03-25 | End: 2021-03-29 | Stop reason: HOSPADM

## 2021-03-25 RX ORDER — INSULIN ASPART 100 [IU]/ML
0-5 INJECTION, SOLUTION INTRAVENOUS; SUBCUTANEOUS
Status: DISCONTINUED | OUTPATIENT
Start: 2021-03-25 | End: 2021-03-29 | Stop reason: HOSPADM

## 2021-03-25 RX ORDER — PREDNISONE 20 MG/1
20 TABLET ORAL DAILY
Status: DISCONTINUED | OUTPATIENT
Start: 2021-03-28 | End: 2021-03-29 | Stop reason: HOSPADM

## 2021-03-25 RX ORDER — DOCUSATE SODIUM 100 MG/1
100 CAPSULE, LIQUID FILLED ORAL 2 TIMES DAILY
Status: DISCONTINUED | OUTPATIENT
Start: 2021-03-25 | End: 2021-03-25

## 2021-03-25 RX ORDER — TRAMADOL HYDROCHLORIDE 50 MG/1
50 TABLET ORAL EVERY 6 HOURS PRN
Status: DISCONTINUED | OUTPATIENT
Start: 2021-03-25 | End: 2021-03-29 | Stop reason: HOSPADM

## 2021-03-25 RX ORDER — ROSUVASTATIN CALCIUM 10 MG/1
10 TABLET, COATED ORAL DAILY
Status: DISCONTINUED | OUTPATIENT
Start: 2021-03-25 | End: 2021-03-26

## 2021-03-25 RX ORDER — HEPARIN SODIUM 5000 [USP'U]/ML
5000 INJECTION, SOLUTION INTRAVENOUS; SUBCUTANEOUS ONCE
Status: COMPLETED | OUTPATIENT
Start: 2021-03-25 | End: 2021-03-25

## 2021-03-25 RX ORDER — GLUCAGON 1 MG
1 KIT INJECTION CONTINUOUS PRN
Status: DISCONTINUED | OUTPATIENT
Start: 2021-03-25 | End: 2021-03-25

## 2021-03-25 RX ORDER — PHENYLEPHRINE HYDROCHLORIDE 10 MG/ML
INJECTION INTRAVENOUS
Status: DISCONTINUED | OUTPATIENT
Start: 2021-03-25 | End: 2021-03-25

## 2021-03-25 RX ORDER — MONTELUKAST SODIUM 10 MG/1
10 TABLET ORAL DAILY
Status: DISCONTINUED | OUTPATIENT
Start: 2021-03-25 | End: 2021-03-29 | Stop reason: HOSPADM

## 2021-03-25 RX ORDER — MIDAZOLAM HYDROCHLORIDE 1 MG/ML
INJECTION, SOLUTION INTRAMUSCULAR; INTRAVENOUS
Status: DISCONTINUED | OUTPATIENT
Start: 2021-03-25 | End: 2021-03-25

## 2021-03-25 RX ORDER — GLUCAGON 1 MG
1 KIT INJECTION
Status: DISCONTINUED | OUTPATIENT
Start: 2021-03-25 | End: 2021-03-29 | Stop reason: HOSPADM

## 2021-03-25 RX ORDER — DIPHENHYDRAMINE HYDROCHLORIDE 50 MG/ML
INJECTION INTRAMUSCULAR; INTRAVENOUS
Status: DISCONTINUED | OUTPATIENT
Start: 2021-03-25 | End: 2021-03-25

## 2021-03-25 RX ORDER — FENTANYL CITRATE 50 UG/ML
25 INJECTION, SOLUTION INTRAMUSCULAR; INTRAVENOUS EVERY 5 MIN PRN
Status: DISCONTINUED | OUTPATIENT
Start: 2021-03-25 | End: 2021-03-25 | Stop reason: HOSPADM

## 2021-03-25 RX ORDER — MYCOPHENOLATE MOFETIL 250 MG/1
1000 CAPSULE ORAL 2 TIMES DAILY
Status: DISCONTINUED | OUTPATIENT
Start: 2021-03-25 | End: 2021-03-29 | Stop reason: HOSPADM

## 2021-03-25 RX ORDER — ACETAMINOPHEN 650 MG/20.3ML
LIQUID ORAL
Status: DISCONTINUED | OUTPATIENT
Start: 2021-03-25 | End: 2021-03-25

## 2021-03-25 RX ORDER — DOCUSATE SODIUM 100 MG/1
100 CAPSULE, LIQUID FILLED ORAL 3 TIMES DAILY
Status: DISCONTINUED | OUTPATIENT
Start: 2021-03-25 | End: 2021-03-29 | Stop reason: HOSPADM

## 2021-03-25 RX ORDER — NYSTATIN 100000 [USP'U]/ML
500000 SUSPENSION ORAL
Status: DISCONTINUED | OUTPATIENT
Start: 2021-03-25 | End: 2021-03-29 | Stop reason: HOSPADM

## 2021-03-25 RX ORDER — BACITRACIN ZINC 500 UNIT/G
OINTMENT (GRAM) TOPICAL
Status: DISCONTINUED | OUTPATIENT
Start: 2021-03-25 | End: 2021-03-25 | Stop reason: HOSPADM

## 2021-03-25 RX ORDER — INSULIN ASPART 100 [IU]/ML
0-5 INJECTION, SOLUTION INTRAVENOUS; SUBCUTANEOUS
Status: DISCONTINUED | OUTPATIENT
Start: 2021-03-25 | End: 2021-03-25

## 2021-03-25 RX ORDER — IBUPROFEN 200 MG
16 TABLET ORAL
Status: DISCONTINUED | OUTPATIENT
Start: 2021-03-25 | End: 2021-03-25

## 2021-03-25 RX ORDER — SULFAMETHOXAZOLE AND TRIMETHOPRIM 400; 80 MG/1; MG/1
1 TABLET ORAL EVERY MORNING
Status: DISCONTINUED | OUTPATIENT
Start: 2021-03-26 | End: 2021-03-29 | Stop reason: HOSPADM

## 2021-03-25 RX ORDER — FUROSEMIDE 10 MG/ML
INJECTION INTRAMUSCULAR; INTRAVENOUS
Status: DISCONTINUED | OUTPATIENT
Start: 2021-03-25 | End: 2021-03-25

## 2021-03-25 RX ORDER — NEOSTIGMINE METHYLSULFATE 0.5 MG/ML
INJECTION, SOLUTION INTRAVENOUS
Status: DISCONTINUED | OUTPATIENT
Start: 2021-03-25 | End: 2021-03-25

## 2021-03-25 RX ORDER — IBUPROFEN 200 MG
24 TABLET ORAL
Status: DISCONTINUED | OUTPATIENT
Start: 2021-03-25 | End: 2021-03-29 | Stop reason: HOSPADM

## 2021-03-25 RX ORDER — HEPARIN SODIUM 10000 [USP'U]/ML
INJECTION, SOLUTION INTRAVENOUS; SUBCUTANEOUS
Status: DISCONTINUED | OUTPATIENT
Start: 2021-03-25 | End: 2021-03-25 | Stop reason: HOSPADM

## 2021-03-25 RX ORDER — ACETAMINOPHEN 500 MG
1000 TABLET ORAL EVERY 8 HOURS
Status: COMPLETED | OUTPATIENT
Start: 2021-03-25 | End: 2021-03-27

## 2021-03-25 RX ORDER — ONDANSETRON 2 MG/ML
INJECTION INTRAMUSCULAR; INTRAVENOUS
Status: DISCONTINUED | OUTPATIENT
Start: 2021-03-25 | End: 2021-03-25

## 2021-03-25 RX ORDER — MANNITOL 250 MG/ML
INJECTION, SOLUTION INTRAVENOUS
Status: DISCONTINUED | OUTPATIENT
Start: 2021-03-25 | End: 2021-03-25

## 2021-03-25 RX ORDER — ONDANSETRON 2 MG/ML
4 INJECTION INTRAMUSCULAR; INTRAVENOUS ONCE AS NEEDED
Status: COMPLETED | OUTPATIENT
Start: 2021-03-25 | End: 2021-03-25

## 2021-03-25 RX ORDER — CISATRACURIUM BESYLATE 10 MG/ML
INJECTION, SOLUTION INTRAVENOUS
Status: DISCONTINUED | OUTPATIENT
Start: 2021-03-25 | End: 2021-03-25

## 2021-03-25 RX ORDER — BISACODYL 5 MG
10 TABLET, DELAYED RELEASE (ENTERIC COATED) ORAL NIGHTLY
Status: DISCONTINUED | OUTPATIENT
Start: 2021-03-25 | End: 2021-03-29 | Stop reason: HOSPADM

## 2021-03-25 RX ORDER — KETAMINE HCL IN 0.9 % NACL 50 MG/5 ML
SYRINGE (ML) INTRAVENOUS
Status: DISCONTINUED | OUTPATIENT
Start: 2021-03-25 | End: 2021-03-25

## 2021-03-25 RX ORDER — METHYLPREDNISOLONE SOD SUCC 125 MG
125 VIAL (EA) INJECTION ONCE
Status: COMPLETED | OUTPATIENT
Start: 2021-03-27 | End: 2021-03-27

## 2021-03-25 RX ORDER — VALGANCICLOVIR 450 MG/1
450 TABLET, FILM COATED ORAL EVERY MORNING
Status: DISCONTINUED | OUTPATIENT
Start: 2021-04-04 | End: 2021-03-29 | Stop reason: HOSPADM

## 2021-03-25 RX ORDER — DEXTROSE MONOHYDRATE AND SODIUM CHLORIDE 5; .45 G/100ML; G/100ML
INJECTION, SOLUTION INTRAVENOUS CONTINUOUS
Status: DISCONTINUED | OUTPATIENT
Start: 2021-03-25 | End: 2021-03-26

## 2021-03-25 RX ORDER — FAMOTIDINE 20 MG/1
20 TABLET, FILM COATED ORAL NIGHTLY
Status: DISCONTINUED | OUTPATIENT
Start: 2021-03-25 | End: 2021-03-26

## 2021-03-25 RX ORDER — LEVOTHYROXINE SODIUM 100 UG/1
100 TABLET ORAL DAILY
Status: DISCONTINUED | OUTPATIENT
Start: 2021-03-26 | End: 2021-03-29 | Stop reason: HOSPADM

## 2021-03-25 RX ORDER — FAMOTIDINE 10 MG/ML
INJECTION INTRAVENOUS
Status: DISCONTINUED | OUTPATIENT
Start: 2021-03-25 | End: 2021-03-25

## 2021-03-25 RX ORDER — PREGABALIN 75 MG/1
75 CAPSULE ORAL
Status: DISCONTINUED | OUTPATIENT
Start: 2021-03-25 | End: 2021-03-25 | Stop reason: HOSPADM

## 2021-03-25 RX ORDER — IBUPROFEN 200 MG
16 TABLET ORAL
Status: DISCONTINUED | OUTPATIENT
Start: 2021-03-25 | End: 2021-03-29 | Stop reason: HOSPADM

## 2021-03-25 RX ORDER — IBUPROFEN 200 MG
24 TABLET ORAL
Status: DISCONTINUED | OUTPATIENT
Start: 2021-03-25 | End: 2021-03-25

## 2021-03-25 RX ORDER — MUPIROCIN 20 MG/G
1 OINTMENT TOPICAL 2 TIMES DAILY
Status: DISCONTINUED | OUTPATIENT
Start: 2021-03-25 | End: 2021-03-29 | Stop reason: HOSPADM

## 2021-03-25 RX ORDER — TACROLIMUS 1 MG/1
3 CAPSULE ORAL 2 TIMES DAILY
Status: DISCONTINUED | OUTPATIENT
Start: 2021-03-25 | End: 2021-03-26

## 2021-03-25 RX ORDER — LIDOCAINE HYDROCHLORIDE 20 MG/ML
INJECTION INTRAVENOUS
Status: DISCONTINUED | OUTPATIENT
Start: 2021-03-25 | End: 2021-03-25

## 2021-03-25 RX ORDER — MUPIROCIN 20 MG/G
OINTMENT TOPICAL
Status: DISCONTINUED | OUTPATIENT
Start: 2021-03-25 | End: 2021-03-25 | Stop reason: HOSPADM

## 2021-03-25 RX ADMIN — CEFAZOLIN 2 G: 330 INJECTION, POWDER, FOR SOLUTION INTRAMUSCULAR; INTRAVENOUS at 11:03

## 2021-03-25 RX ADMIN — LEVOTHYROXINE SODIUM 100 MCG: 25 TABLET ORAL at 01:03

## 2021-03-25 RX ADMIN — INSULIN ASPART 4 UNITS: 100 INJECTION, SOLUTION INTRAVENOUS; SUBCUTANEOUS at 01:03

## 2021-03-25 RX ADMIN — DIPHENHYDRAMINE HYDROCHLORIDE 50 MG: 50 INJECTION INTRAMUSCULAR; INTRAVENOUS at 08:03

## 2021-03-25 RX ADMIN — Medication 20 MG: at 07:03

## 2021-03-25 RX ADMIN — FENTANYL CITRATE 50 MCG: 50 INJECTION, SOLUTION INTRAMUSCULAR; INTRAVENOUS at 11:03

## 2021-03-25 RX ADMIN — SODIUM CHLORIDE 500 MG: 9 INJECTION, SOLUTION INTRAVENOUS at 08:03

## 2021-03-25 RX ADMIN — CEFAZOLIN 2 G: 330 INJECTION, POWDER, FOR SOLUTION INTRAMUSCULAR; INTRAVENOUS at 04:03

## 2021-03-25 RX ADMIN — FENTANYL CITRATE 50 MCG: 50 INJECTION, SOLUTION INTRAMUSCULAR; INTRAVENOUS at 10:03

## 2021-03-25 RX ADMIN — HEPARIN SODIUM 5000 UNITS: 5000 INJECTION INTRAVENOUS; SUBCUTANEOUS at 03:03

## 2021-03-25 RX ADMIN — FENTANYL CITRATE 50 MCG: 50 INJECTION, SOLUTION INTRAMUSCULAR; INTRAVENOUS at 09:03

## 2021-03-25 RX ADMIN — MIDAZOLAM HYDROCHLORIDE 2 MG: 1 INJECTION, SOLUTION INTRAMUSCULAR; INTRAVENOUS at 07:03

## 2021-03-25 RX ADMIN — DEXTROSE AND SODIUM CHLORIDE: 5; .45 INJECTION, SOLUTION INTRAVENOUS at 12:03

## 2021-03-25 RX ADMIN — PROPOFOL 50 MG: 10 INJECTION, EMULSION INTRAVENOUS at 10:03

## 2021-03-25 RX ADMIN — ACETAMINOPHEN 1000 MG: 500 TABLET ORAL at 08:03

## 2021-03-25 RX ADMIN — Medication 10 MG: at 11:03

## 2021-03-25 RX ADMIN — PHENYLEPHRINE HYDROCHLORIDE 100 MCG: 10 INJECTION INTRAVENOUS at 08:03

## 2021-03-25 RX ADMIN — OXYCODONE 5 MG: 5 TABLET ORAL at 01:03

## 2021-03-25 RX ADMIN — FUROSEMIDE 100 MG: 10 INJECTION, SOLUTION INTRAMUSCULAR; INTRAVENOUS at 10:03

## 2021-03-25 RX ADMIN — FENTANYL CITRATE 25 MCG: 50 INJECTION, SOLUTION INTRAMUSCULAR; INTRAVENOUS at 11:03

## 2021-03-25 RX ADMIN — SODIUM CHLORIDE 20 MG: 9 INJECTION, SOLUTION INTRAVENOUS at 09:03

## 2021-03-25 RX ADMIN — SODIUM CHLORIDE: 0.9 INJECTION, SOLUTION INTRAVENOUS at 12:03

## 2021-03-25 RX ADMIN — CISATRACURIUM BESYLATE 4 MG: 10 INJECTION INTRAVENOUS at 09:03

## 2021-03-25 RX ADMIN — EPHEDRINE SULFATE 5 MG: 50 INJECTION INTRAVENOUS at 10:03

## 2021-03-25 RX ADMIN — CISATRACURIUM BESYLATE 20 MG: 10 INJECTION INTRAVENOUS at 07:03

## 2021-03-25 RX ADMIN — ACETAMINOPHEN ORAL SOLUTION 650 MG: 650 SOLUTION ORAL at 08:03

## 2021-03-25 RX ADMIN — EPHEDRINE SULFATE 5 MG: 50 INJECTION INTRAVENOUS at 11:03

## 2021-03-25 RX ADMIN — MANNITOL 25 G: 250 INJECTION, SOLUTION INTRAVENOUS at 10:03

## 2021-03-25 RX ADMIN — Medication 10 MG: at 09:03

## 2021-03-25 RX ADMIN — MYCOPHENOLATE MOFETIL 1000 MG: 250 CAPSULE ORAL at 08:03

## 2021-03-25 RX ADMIN — FENTANYL CITRATE 50 MCG: 50 INJECTION, SOLUTION INTRAMUSCULAR; INTRAVENOUS at 07:03

## 2021-03-25 RX ADMIN — FAMOTIDINE 20 MG: 10 INJECTION, SOLUTION INTRAVENOUS at 07:03

## 2021-03-25 RX ADMIN — CEFAZOLIN 2 G: 330 INJECTION, POWDER, FOR SOLUTION INTRAMUSCULAR; INTRAVENOUS at 08:03

## 2021-03-25 RX ADMIN — TACROLIMUS 3 MG: 1 CAPSULE ORAL at 06:03

## 2021-03-25 RX ADMIN — PHENYLEPHRINE HYDROCHLORIDE 100 MCG: 10 INJECTION INTRAVENOUS at 09:03

## 2021-03-25 RX ADMIN — THERA TABS 1 TABLET: TAB at 08:03

## 2021-03-25 RX ADMIN — NYSTATIN 500000 UNITS: 100000 SUSPENSION ORAL at 08:03

## 2021-03-25 RX ADMIN — PROPOFOL 100 MG: 10 INJECTION, EMULSION INTRAVENOUS at 07:03

## 2021-03-25 RX ADMIN — FENTANYL CITRATE 25 MCG: 50 INJECTION, SOLUTION INTRAMUSCULAR; INTRAVENOUS at 01:03

## 2021-03-25 RX ADMIN — ONDANSETRON 4 MG: 2 INJECTION, SOLUTION INTRAMUSCULAR; INTRAVENOUS at 07:03

## 2021-03-25 RX ADMIN — LIDOCAINE HYDROCHLORIDE 100 MG: 20 INJECTION, SOLUTION INTRAVENOUS at 07:03

## 2021-03-25 RX ADMIN — NYSTATIN 500000 UNITS: 100000 SUSPENSION ORAL at 01:03

## 2021-03-25 RX ADMIN — DOCUSATE SODIUM 100 MG: 100 CAPSULE, LIQUID FILLED ORAL at 08:03

## 2021-03-25 RX ADMIN — OXYCODONE 5 MG: 5 TABLET ORAL at 08:03

## 2021-03-25 RX ADMIN — PROPOFOL 50 MG: 10 INJECTION, EMULSION INTRAVENOUS at 09:03

## 2021-03-25 RX ADMIN — INSULIN ASPART 3 UNITS: 100 INJECTION, SOLUTION INTRAVENOUS; SUBCUTANEOUS at 06:03

## 2021-03-25 RX ADMIN — EPHEDRINE SULFATE 5 MG: 50 INJECTION INTRAVENOUS at 09:03

## 2021-03-25 RX ADMIN — NEOSTIGMINE METHYLSULFATE 5 MG: 0.5 INJECTION INTRAVENOUS at 11:03

## 2021-03-25 RX ADMIN — CISATRACURIUM BESYLATE 2 MG: 10 INJECTION INTRAVENOUS at 10:03

## 2021-03-25 RX ADMIN — INSULIN ASPART 4 UNITS: 100 INJECTION, SOLUTION INTRAVENOUS; SUBCUTANEOUS at 06:03

## 2021-03-25 RX ADMIN — GLYCOPYRROLATE 0.6 MG: 0.2 INJECTION, SOLUTION INTRAMUSCULAR; INTRAVITREAL at 11:03

## 2021-03-25 RX ADMIN — ACETAMINOPHEN 1000 MG: 500 TABLET ORAL at 01:03

## 2021-03-25 RX ADMIN — MUPIROCIN 1 G: 20 OINTMENT TOPICAL at 08:03

## 2021-03-25 RX ADMIN — BISACODYL 10 MG: 5 TABLET, COATED ORAL at 08:03

## 2021-03-25 RX ADMIN — ONDANSETRON 4 MG: 2 INJECTION INTRAMUSCULAR; INTRAVENOUS at 05:03

## 2021-03-25 RX ADMIN — MONTELUKAST 10 MG: 10 TABLET, FILM COATED ORAL at 08:03

## 2021-03-25 RX ADMIN — CISATRACURIUM BESYLATE 2 MG: 10 INJECTION INTRAVENOUS at 11:03

## 2021-03-25 RX ADMIN — ROSUVASTATIN CALCIUM 10 MG: 10 TABLET, FILM COATED ORAL at 08:03

## 2021-03-25 RX ADMIN — INSULIN ASPART 3 UNITS: 100 INJECTION, SOLUTION INTRAVENOUS; SUBCUTANEOUS at 09:03

## 2021-03-25 RX ADMIN — FAMOTIDINE 20 MG: 20 TABLET ORAL at 08:03

## 2021-03-25 RX ADMIN — FENTANYL CITRATE 50 MCG: 50 INJECTION, SOLUTION INTRAMUSCULAR; INTRAVENOUS at 08:03

## 2021-03-25 RX ADMIN — MYCOPHENOLATE MOFETIL 1000 MG: 250 CAPSULE ORAL at 01:03

## 2021-03-26 PROBLEM — N18.6 ESRD (END STAGE RENAL DISEASE): Status: RESOLVED | Noted: 2021-03-25 | Resolved: 2021-03-26

## 2021-03-26 PROBLEM — D62 ACUTE BLOOD LOSS ANEMIA: Status: ACTIVE | Noted: 2021-03-26

## 2021-03-26 PROBLEM — D63.8 ANEMIA OF CHRONIC DISEASE: Status: ACTIVE | Noted: 2021-03-26

## 2021-03-26 PROBLEM — Z79.60 LONG-TERM USE OF IMMUNOSUPPRESSANT MEDICATION: Status: ACTIVE | Noted: 2021-03-26

## 2021-03-26 LAB
ALBUMIN SERPL BCP-MCNC: 2.7 G/DL (ref 3.5–5.2)
ANION GAP SERPL CALC-SCNC: 20 MMOL/L (ref 8–16)
BASOPHILS # BLD AUTO: 0.06 K/UL (ref 0–0.2)
BASOPHILS NFR BLD: 0.2 % (ref 0–1.9)
BUN SERPL-MCNC: 49 MG/DL (ref 8–23)
CALCIUM SERPL-MCNC: 8.8 MG/DL (ref 8.7–10.5)
CHLORIDE SERPL-SCNC: 97 MMOL/L (ref 95–110)
CO2 SERPL-SCNC: 19 MMOL/L (ref 23–29)
CREAT SERPL-MCNC: 7.3 MG/DL (ref 0.5–1.4)
CREAT UR-MCNC: 129 MG/DL (ref 15–325)
DIFFERENTIAL METHOD: ABNORMAL
EOSINOPHIL # BLD AUTO: 0 K/UL (ref 0–0.5)
EOSINOPHIL NFR BLD: 0 % (ref 0–8)
ERYTHROCYTE [DISTWIDTH] IN BLOOD BY AUTOMATED COUNT: 15.1 % (ref 11.5–14.5)
EST. GFR  (AFRICAN AMERICAN): 5.9 ML/MIN/1.73 M^2
EST. GFR  (NON AFRICAN AMERICAN): 5.1 ML/MIN/1.73 M^2
ESTIMATED AVG GLUCOSE: 137 MG/DL (ref 68–131)
GLUCOSE SERPL-MCNC: 115 MG/DL (ref 70–110)
GLUCOSE SERPL-MCNC: 397 MG/DL (ref 70–110)
HBA1C MFR BLD: 6.4 % (ref 4–5.6)
HCO3 UR-SCNC: 26.7 MMOL/L (ref 24–28)
HCT VFR BLD AUTO: 28.2 % (ref 37–48.5)
HCT VFR BLD CALC: 25 %PCV (ref 36–54)
HGB BLD-MCNC: 9.1 G/DL (ref 12–16)
IMM GRANULOCYTES # BLD AUTO: 0.27 K/UL (ref 0–0.04)
IMM GRANULOCYTES NFR BLD AUTO: 1.1 % (ref 0–0.5)
LYMPHOCYTES # BLD AUTO: 1.1 K/UL (ref 1–4.8)
LYMPHOCYTES NFR BLD: 4.4 % (ref 18–48)
MAGNESIUM SERPL-MCNC: 2.1 MG/DL (ref 1.6–2.6)
MCH RBC QN AUTO: 30 PG (ref 27–31)
MCHC RBC AUTO-ENTMCNC: 32.3 G/DL (ref 32–36)
MCV RBC AUTO: 93 FL (ref 82–98)
MONOCYTES # BLD AUTO: 2 K/UL (ref 0.3–1)
MONOCYTES NFR BLD: 8 % (ref 4–15)
NEUTROPHILS # BLD AUTO: 21.2 K/UL (ref 1.8–7.7)
NEUTROPHILS NFR BLD: 86.3 % (ref 38–73)
NRBC BLD-RTO: 0 /100 WBC
PCO2 BLDA: 36.2 MMHG (ref 35–45)
PH SMN: 7.48 [PH] (ref 7.35–7.45)
PHOSPHATE SERPL-MCNC: 6.3 MG/DL (ref 2.7–4.5)
PHOSPHATE SERPL-MCNC: 6.3 MG/DL (ref 2.7–4.5)
PLATELET # BLD AUTO: 308 K/UL (ref 150–350)
PMV BLD AUTO: 9.2 FL (ref 9.2–12.9)
PO2 BLDA: 242 MMHG (ref 80–100)
POC BE: 3 MMOL/L
POC IONIZED CALCIUM: 2 MMOL/L (ref 1.06–1.42)
POC SATURATED O2: 100 % (ref 95–100)
POC TCO2: 28 MMOL/L (ref 23–27)
POCT GLUCOSE: 262 MG/DL (ref 70–110)
POCT GLUCOSE: 319 MG/DL (ref 70–110)
POCT GLUCOSE: 336 MG/DL (ref 70–110)
POCT GLUCOSE: 448 MG/DL (ref 70–110)
POTASSIUM BLD-SCNC: 3.8 MMOL/L (ref 3.5–5.1)
POTASSIUM SERPL-SCNC: 4 MMOL/L (ref 3.5–5.1)
PROT UR-MCNC: 86 MG/DL (ref 0–15)
PROT/CREAT UR: 0.67 MG/G{CREAT} (ref 0–0.2)
RBC # BLD AUTO: 3.03 M/UL (ref 4–5.4)
SAMPLE: ABNORMAL
SODIUM BLD-SCNC: 138 MMOL/L (ref 136–145)
SODIUM SERPL-SCNC: 136 MMOL/L (ref 136–145)
TACROLIMUS BLD-MCNC: 9.6 NG/ML (ref 5–15)
WBC # BLD AUTO: 24.52 K/UL (ref 3.9–12.7)

## 2021-03-26 PROCEDURE — 25000003 PHARM REV CODE 250: Performed by: STUDENT IN AN ORGANIZED HEALTH CARE EDUCATION/TRAINING PROGRAM

## 2021-03-26 PROCEDURE — 84156 ASSAY OF PROTEIN URINE: CPT | Performed by: PHYSICIAN ASSISTANT

## 2021-03-26 PROCEDURE — 63600175 PHARM REV CODE 636 W HCPCS: Performed by: STUDENT IN AN ORGANIZED HEALTH CARE EDUCATION/TRAINING PROGRAM

## 2021-03-26 PROCEDURE — 25000003 PHARM REV CODE 250: Performed by: PHYSICIAN ASSISTANT

## 2021-03-26 PROCEDURE — C9399 UNCLASSIFIED DRUGS OR BIOLOG: HCPCS | Performed by: NURSE PRACTITIONER

## 2021-03-26 PROCEDURE — 20600001 HC STEP DOWN PRIVATE ROOM

## 2021-03-26 PROCEDURE — 99233 PR SUBSEQUENT HOSPITAL CARE,LEVL III: ICD-10-PCS | Mod: ,,, | Performed by: PHYSICIAN ASSISTANT

## 2021-03-26 PROCEDURE — 25000003 PHARM REV CODE 250: Performed by: TRANSPLANT SURGERY

## 2021-03-26 PROCEDURE — 25000003 PHARM REV CODE 250: Performed by: NURSE PRACTITIONER

## 2021-03-26 PROCEDURE — 63600175 PHARM REV CODE 636 W HCPCS: Performed by: PHYSICIAN ASSISTANT

## 2021-03-26 PROCEDURE — 99233 SBSQ HOSP IP/OBS HIGH 50: CPT | Mod: ,,, | Performed by: PHYSICIAN ASSISTANT

## 2021-03-26 PROCEDURE — 80197 ASSAY OF TACROLIMUS: CPT | Performed by: STUDENT IN AN ORGANIZED HEALTH CARE EDUCATION/TRAINING PROGRAM

## 2021-03-26 PROCEDURE — 99232 SBSQ HOSP IP/OBS MODERATE 35: CPT | Mod: ,,, | Performed by: NURSE PRACTITIONER

## 2021-03-26 PROCEDURE — 80069 RENAL FUNCTION PANEL: CPT | Performed by: STUDENT IN AN ORGANIZED HEALTH CARE EDUCATION/TRAINING PROGRAM

## 2021-03-26 PROCEDURE — 36415 COLL VENOUS BLD VENIPUNCTURE: CPT | Performed by: TRANSPLANT SURGERY

## 2021-03-26 PROCEDURE — 99232 PR SUBSEQUENT HOSPITAL CARE,LEVL II: ICD-10-PCS | Mod: ,,, | Performed by: NURSE PRACTITIONER

## 2021-03-26 PROCEDURE — 83036 HEMOGLOBIN GLYCOSYLATED A1C: CPT | Performed by: TRANSPLANT SURGERY

## 2021-03-26 PROCEDURE — 85025 COMPLETE CBC W/AUTO DIFF WBC: CPT | Performed by: STUDENT IN AN ORGANIZED HEALTH CARE EDUCATION/TRAINING PROGRAM

## 2021-03-26 PROCEDURE — 83735 ASSAY OF MAGNESIUM: CPT | Performed by: STUDENT IN AN ORGANIZED HEALTH CARE EDUCATION/TRAINING PROGRAM

## 2021-03-26 PROCEDURE — 63600175 PHARM REV CODE 636 W HCPCS: Performed by: INTERNAL MEDICINE

## 2021-03-26 RX ORDER — SULFAMETHOXAZOLE AND TRIMETHOPRIM 400; 80 MG/1; MG/1
1 TABLET ORAL EVERY MORNING
Qty: 30 TABLET | Refills: 5 | Status: SHIPPED | OUTPATIENT
Start: 2021-03-26 | End: 2021-04-01 | Stop reason: SDUPTHER

## 2021-03-26 RX ORDER — VALGANCICLOVIR 450 MG/1
450 TABLET, FILM COATED ORAL EVERY MORNING
Qty: 30 TABLET | Refills: 2 | Status: SHIPPED | OUTPATIENT
Start: 2021-03-26 | End: 2021-04-01 | Stop reason: SDUPTHER

## 2021-03-26 RX ORDER — MONTELUKAST SODIUM 10 MG/1
10 TABLET ORAL DAILY
Qty: 30 TABLET | Refills: 5 | Status: SHIPPED | OUTPATIENT
Start: 2021-03-26 | End: 2021-04-01 | Stop reason: SDUPTHER

## 2021-03-26 RX ORDER — MYCOPHENOLATE MOFETIL 250 MG/1
1000 CAPSULE ORAL 2 TIMES DAILY
Qty: 240 CAPSULE | Refills: 11 | Status: SHIPPED | OUTPATIENT
Start: 2021-03-26 | End: 2021-04-01 | Stop reason: SDUPTHER

## 2021-03-26 RX ORDER — FUROSEMIDE 10 MG/ML
60 INJECTION INTRAMUSCULAR; INTRAVENOUS ONCE
Status: COMPLETED | OUTPATIENT
Start: 2021-03-26 | End: 2021-03-26

## 2021-03-26 RX ORDER — TACROLIMUS 1 MG/1
2 CAPSULE ORAL 2 TIMES DAILY
Status: DISCONTINUED | OUTPATIENT
Start: 2021-03-26 | End: 2021-03-28

## 2021-03-26 RX ORDER — ROSUVASTATIN CALCIUM 20 MG/1
20 TABLET, COATED ORAL DAILY
Qty: 30 TABLET | Refills: 5 | Status: SHIPPED | OUTPATIENT
Start: 2021-03-26 | End: 2021-04-01 | Stop reason: SDUPTHER

## 2021-03-26 RX ORDER — ROSUVASTATIN CALCIUM 10 MG/1
20 TABLET, COATED ORAL DAILY
Status: DISCONTINUED | OUTPATIENT
Start: 2021-03-27 | End: 2021-03-29 | Stop reason: HOSPADM

## 2021-03-26 RX ORDER — ROSUVASTATIN CALCIUM 10 MG/1
10 TABLET, COATED ORAL DAILY
Qty: 30 TABLET | Refills: 5 | Status: CANCELLED | OUTPATIENT
Start: 2021-03-26

## 2021-03-26 RX ORDER — LEVETIRACETAM 500 MG/1
500 TABLET ORAL 2 TIMES DAILY
Status: DISCONTINUED | OUTPATIENT
Start: 2021-03-26 | End: 2021-03-29 | Stop reason: HOSPADM

## 2021-03-26 RX ORDER — PANTOPRAZOLE SODIUM 40 MG/1
40 TABLET, DELAYED RELEASE ORAL
Qty: 60 TABLET | Refills: 11 | Status: SHIPPED | OUTPATIENT
Start: 2021-03-26 | End: 2021-04-01 | Stop reason: SDUPTHER

## 2021-03-26 RX ORDER — HEPARIN SODIUM 5000 [USP'U]/ML
5000 INJECTION, SOLUTION INTRAVENOUS; SUBCUTANEOUS EVERY 8 HOURS
Status: DISCONTINUED | OUTPATIENT
Start: 2021-03-26 | End: 2021-03-29 | Stop reason: HOSPADM

## 2021-03-26 RX ORDER — TACROLIMUS 1 MG/1
6 CAPSULE ORAL EVERY 12 HOURS
Qty: 360 CAPSULE | Refills: 11 | Status: SHIPPED | OUTPATIENT
Start: 2021-03-26 | End: 2021-03-29

## 2021-03-26 RX ORDER — INSULIN ASPART 100 [IU]/ML
8 INJECTION, SOLUTION INTRAVENOUS; SUBCUTANEOUS
Status: DISCONTINUED | OUTPATIENT
Start: 2021-03-26 | End: 2021-03-27

## 2021-03-26 RX ORDER — SODIUM BICARBONATE 650 MG/1
1300 TABLET ORAL 2 TIMES DAILY
Qty: 120 TABLET | Refills: 11 | Status: SHIPPED | OUTPATIENT
Start: 2021-03-26 | End: 2021-03-29 | Stop reason: HOSPADM

## 2021-03-26 RX ORDER — SODIUM BICARBONATE 650 MG/1
1300 TABLET ORAL 2 TIMES DAILY
Status: DISCONTINUED | OUTPATIENT
Start: 2021-03-26 | End: 2021-03-28

## 2021-03-26 RX ORDER — PREDNISONE 5 MG/1
TABLET ORAL
Qty: 120 TABLET | Refills: 11 | Status: SHIPPED | OUTPATIENT
Start: 2021-03-26 | End: 2021-04-01 | Stop reason: SDUPTHER

## 2021-03-26 RX ORDER — LEVETIRACETAM 250 MG/1
500 TABLET ORAL 2 TIMES DAILY
Qty: 120 TABLET | Refills: 5 | Status: SHIPPED | OUTPATIENT
Start: 2021-03-26 | End: 2021-04-01 | Stop reason: SDUPTHER

## 2021-03-26 RX ORDER — LEVOTHYROXINE SODIUM 100 UG/1
100 TABLET ORAL DAILY
Qty: 30 TABLET | Refills: 5 | Status: SHIPPED | OUTPATIENT
Start: 2021-03-26 | End: 2021-04-01 | Stop reason: SDUPTHER

## 2021-03-26 RX ORDER — FUROSEMIDE 10 MG/ML
40 INJECTION INTRAMUSCULAR; INTRAVENOUS ONCE
Status: DISCONTINUED | OUTPATIENT
Start: 2021-03-26 | End: 2021-03-26

## 2021-03-26 RX ORDER — NYSTATIN 100000 [USP'U]/ML
500000 SUSPENSION ORAL
Qty: 480 ML | Refills: 1 | Status: SHIPPED | OUTPATIENT
Start: 2021-03-26 | End: 2021-06-02

## 2021-03-26 RX ORDER — PANTOPRAZOLE SODIUM 40 MG/1
40 TABLET, DELAYED RELEASE ORAL
Status: DISCONTINUED | OUTPATIENT
Start: 2021-03-26 | End: 2021-03-29 | Stop reason: HOSPADM

## 2021-03-26 RX ORDER — SODIUM CHLORIDE 9 MG/ML
INJECTION, SOLUTION INTRAVENOUS CONTINUOUS
Status: DISCONTINUED | OUTPATIENT
Start: 2021-03-26 | End: 2021-03-27

## 2021-03-26 RX ADMIN — INSULIN ASPART 8 UNITS: 100 INJECTION, SOLUTION INTRAVENOUS; SUBCUTANEOUS at 12:03

## 2021-03-26 RX ADMIN — INSULIN ASPART 4 UNITS: 100 INJECTION, SOLUTION INTRAVENOUS; SUBCUTANEOUS at 05:03

## 2021-03-26 RX ADMIN — NYSTATIN 500000 UNITS: 100000 SUSPENSION ORAL at 06:03

## 2021-03-26 RX ADMIN — BISACODYL 10 MG: 5 TABLET, COATED ORAL at 08:03

## 2021-03-26 RX ADMIN — MONTELUKAST 10 MG: 10 TABLET, FILM COATED ORAL at 12:03

## 2021-03-26 RX ADMIN — HEPARIN SODIUM 5000 UNITS: 5000 INJECTION INTRAVENOUS; SUBCUTANEOUS at 02:03

## 2021-03-26 RX ADMIN — NYSTATIN 500000 UNITS: 100000 SUSPENSION ORAL at 01:03

## 2021-03-26 RX ADMIN — THERA TABS 1 TABLET: TAB at 01:03

## 2021-03-26 RX ADMIN — INSULIN ASPART 8 UNITS: 100 INJECTION, SOLUTION INTRAVENOUS; SUBCUTANEOUS at 05:03

## 2021-03-26 RX ADMIN — SULFAMETHOXAZOLE AND TRIMETHOPRIM 1 TABLET: 400; 80 TABLET ORAL at 01:03

## 2021-03-26 RX ADMIN — PANTOPRAZOLE SODIUM 40 MG: 40 TABLET, DELAYED RELEASE ORAL at 04:03

## 2021-03-26 RX ADMIN — ROSUVASTATIN CALCIUM 10 MG: 10 TABLET, FILM COATED ORAL at 12:03

## 2021-03-26 RX ADMIN — TACROLIMUS 2 MG: 1 CAPSULE ORAL at 06:03

## 2021-03-26 RX ADMIN — OXYCODONE 5 MG: 5 TABLET ORAL at 09:03

## 2021-03-26 RX ADMIN — SODIUM BICARBONATE 650 MG TABLET 1300 MG: at 01:03

## 2021-03-26 RX ADMIN — ACETAMINOPHEN 1000 MG: 500 TABLET ORAL at 08:03

## 2021-03-26 RX ADMIN — DOCUSATE SODIUM 100 MG: 100 CAPSULE, LIQUID FILLED ORAL at 08:03

## 2021-03-26 RX ADMIN — DEXTROSE 250 MG: 50 INJECTION, SOLUTION INTRAVENOUS at 09:03

## 2021-03-26 RX ADMIN — MUPIROCIN 1 G: 20 OINTMENT TOPICAL at 09:03

## 2021-03-26 RX ADMIN — MUPIROCIN 1 G: 20 OINTMENT TOPICAL at 08:03

## 2021-03-26 RX ADMIN — OXYCODONE 5 MG: 5 TABLET ORAL at 08:03

## 2021-03-26 RX ADMIN — DOCUSATE SODIUM 100 MG: 100 CAPSULE, LIQUID FILLED ORAL at 02:03

## 2021-03-26 RX ADMIN — SODIUM BICARBONATE 650 MG TABLET 1300 MG: at 08:03

## 2021-03-26 RX ADMIN — LEVOTHYROXINE SODIUM 100 MCG: 100 TABLET ORAL at 05:03

## 2021-03-26 RX ADMIN — INSULIN ASPART 1 UNITS: 100 INJECTION, SOLUTION INTRAVENOUS; SUBCUTANEOUS at 09:03

## 2021-03-26 RX ADMIN — INSULIN DETEMIR 10 UNITS: 100 INJECTION, SOLUTION SUBCUTANEOUS at 08:03

## 2021-03-26 RX ADMIN — INSULIN ASPART 8 UNITS: 100 INJECTION, SOLUTION INTRAVENOUS; SUBCUTANEOUS at 08:03

## 2021-03-26 RX ADMIN — TACROLIMUS 3 MG: 1 CAPSULE ORAL at 09:03

## 2021-03-26 RX ADMIN — SODIUM CHLORIDE: 0.9 INJECTION, SOLUTION INTRAVENOUS at 10:03

## 2021-03-26 RX ADMIN — MYCOPHENOLATE MOFETIL 1000 MG: 250 CAPSULE ORAL at 10:03

## 2021-03-26 RX ADMIN — INSULIN ASPART 4 UNITS: 100 INJECTION, SOLUTION INTRAVENOUS; SUBCUTANEOUS at 12:03

## 2021-03-26 RX ADMIN — CEFTRIAXONE 2 G: 2 INJECTION, SOLUTION INTRAVENOUS at 01:03

## 2021-03-26 RX ADMIN — LEVETIRACETAM 500 MG: 500 TABLET ORAL at 08:03

## 2021-03-26 RX ADMIN — ACETAMINOPHEN 1000 MG: 500 TABLET ORAL at 05:03

## 2021-03-26 RX ADMIN — NYSTATIN 500000 UNITS: 100000 SUSPENSION ORAL at 08:03

## 2021-03-26 RX ADMIN — ACETAMINOPHEN 1000 MG: 500 TABLET ORAL at 01:03

## 2021-03-26 RX ADMIN — MYCOPHENOLATE MOFETIL 1000 MG: 250 CAPSULE ORAL at 08:03

## 2021-03-26 RX ADMIN — INSULIN ASPART 5 UNITS: 100 INJECTION, SOLUTION INTRAVENOUS; SUBCUTANEOUS at 08:03

## 2021-03-26 RX ADMIN — FUROSEMIDE 60 MG: 10 INJECTION, SOLUTION INTRAMUSCULAR; INTRAVENOUS at 04:03

## 2021-03-26 RX ADMIN — HEPARIN SODIUM 5000 UNITS: 5000 INJECTION INTRAVENOUS; SUBCUTANEOUS at 08:03

## 2021-03-27 LAB
ALBUMIN SERPL BCP-MCNC: 2.7 G/DL (ref 3.5–5.2)
ANION GAP SERPL CALC-SCNC: 16 MMOL/L (ref 8–16)
ANISOCYTOSIS BLD QL SMEAR: SLIGHT
BASOPHILS # BLD AUTO: 0.03 K/UL (ref 0–0.2)
BASOPHILS NFR BLD: 0.1 % (ref 0–1.9)
BUN SERPL-MCNC: 49 MG/DL (ref 8–23)
CALCIUM SERPL-MCNC: 8.8 MG/DL (ref 8.7–10.5)
CHLORIDE SERPL-SCNC: 100 MMOL/L (ref 95–110)
CO2 SERPL-SCNC: 20 MMOL/L (ref 23–29)
CREAT SERPL-MCNC: 4.4 MG/DL (ref 0.5–1.4)
CREAT UR-MCNC: 103 MG/DL (ref 15–325)
DACRYOCYTES BLD QL SMEAR: ABNORMAL
DIFFERENTIAL METHOD: ABNORMAL
EOSINOPHIL # BLD AUTO: 0 K/UL (ref 0–0.5)
EOSINOPHIL NFR BLD: 0 % (ref 0–8)
ERYTHROCYTE [DISTWIDTH] IN BLOOD BY AUTOMATED COUNT: 14.9 % (ref 11.5–14.5)
EST. GFR  (AFRICAN AMERICAN): 10.8 ML/MIN/1.73 M^2
EST. GFR  (NON AFRICAN AMERICAN): 9.4 ML/MIN/1.73 M^2
GLUCOSE SERPL-MCNC: 327 MG/DL (ref 70–110)
HBV SURFACE AB SER QL IA: POSITIVE
HBV SURFACE AB SERPL IA-ACNC: 380 MIU/ML
HCT VFR BLD AUTO: 25.4 % (ref 37–48.5)
HGB BLD-MCNC: 8.3 G/DL (ref 12–16)
HYPOCHROMIA BLD QL SMEAR: ABNORMAL
IMM GRANULOCYTES # BLD AUTO: 0.2 K/UL (ref 0–0.04)
IMM GRANULOCYTES NFR BLD AUTO: 0.7 % (ref 0–0.5)
LYMPHOCYTES # BLD AUTO: 1 K/UL (ref 1–4.8)
LYMPHOCYTES NFR BLD: 3.8 % (ref 18–48)
MAGNESIUM SERPL-MCNC: 2.1 MG/DL (ref 1.6–2.6)
MCH RBC QN AUTO: 29.7 PG (ref 27–31)
MCHC RBC AUTO-ENTMCNC: 32.7 G/DL (ref 32–36)
MCV RBC AUTO: 91 FL (ref 82–98)
MONOCYTES # BLD AUTO: 1.7 K/UL (ref 0.3–1)
MONOCYTES NFR BLD: 6.3 % (ref 4–15)
NEUTROPHILS # BLD AUTO: 24.3 K/UL (ref 1.8–7.7)
NEUTROPHILS NFR BLD: 89.1 % (ref 38–73)
NRBC BLD-RTO: 0 /100 WBC
OVALOCYTES BLD QL SMEAR: ABNORMAL
PHOSPHATE SERPL-MCNC: 5.7 MG/DL (ref 2.7–4.5)
PLATELET # BLD AUTO: 271 K/UL (ref 150–350)
PLATELET BLD QL SMEAR: ABNORMAL
PMV BLD AUTO: 9.4 FL (ref 9.2–12.9)
POCT GLUCOSE: 221 MG/DL (ref 70–110)
POCT GLUCOSE: 255 MG/DL (ref 70–110)
POCT GLUCOSE: 337 MG/DL (ref 70–110)
POIKILOCYTOSIS BLD QL SMEAR: SLIGHT
POLYCHROMASIA BLD QL SMEAR: ABNORMAL
POTASSIUM SERPL-SCNC: 3.7 MMOL/L (ref 3.5–5.1)
PROT UR-MCNC: 57 MG/DL (ref 0–15)
PROT/CREAT UR: 0.55 MG/G{CREAT} (ref 0–0.2)
RBC # BLD AUTO: 2.79 M/UL (ref 4–5.4)
SODIUM SERPL-SCNC: 136 MMOL/L (ref 136–145)
TACROLIMUS BLD-MCNC: 9.4 NG/ML (ref 5–15)
WBC # BLD AUTO: 27.31 K/UL (ref 3.9–12.7)

## 2021-03-27 PROCEDURE — 63600175 PHARM REV CODE 636 W HCPCS: Performed by: STUDENT IN AN ORGANIZED HEALTH CARE EDUCATION/TRAINING PROGRAM

## 2021-03-27 PROCEDURE — 25000003 PHARM REV CODE 250: Performed by: TRANSPLANT SURGERY

## 2021-03-27 PROCEDURE — 99232 SBSQ HOSP IP/OBS MODERATE 35: CPT | Mod: ,,, | Performed by: INTERNAL MEDICINE

## 2021-03-27 PROCEDURE — 63600175 PHARM REV CODE 636 W HCPCS: Performed by: PHYSICIAN ASSISTANT

## 2021-03-27 PROCEDURE — 25000003 PHARM REV CODE 250: Performed by: NURSE PRACTITIONER

## 2021-03-27 PROCEDURE — 20600001 HC STEP DOWN PRIVATE ROOM

## 2021-03-27 PROCEDURE — 99900035 HC TECH TIME PER 15 MIN (STAT)

## 2021-03-27 PROCEDURE — 63600175 PHARM REV CODE 636 W HCPCS: Performed by: INTERNAL MEDICINE

## 2021-03-27 PROCEDURE — 80197 ASSAY OF TACROLIMUS: CPT | Performed by: STUDENT IN AN ORGANIZED HEALTH CARE EDUCATION/TRAINING PROGRAM

## 2021-03-27 PROCEDURE — 25000003 PHARM REV CODE 250: Performed by: PHYSICIAN ASSISTANT

## 2021-03-27 PROCEDURE — 25000003 PHARM REV CODE 250: Performed by: INTERNAL MEDICINE

## 2021-03-27 PROCEDURE — 82570 ASSAY OF URINE CREATININE: CPT | Performed by: PHYSICIAN ASSISTANT

## 2021-03-27 PROCEDURE — 97116 GAIT TRAINING THERAPY: CPT

## 2021-03-27 PROCEDURE — 25000003 PHARM REV CODE 250: Performed by: STUDENT IN AN ORGANIZED HEALTH CARE EDUCATION/TRAINING PROGRAM

## 2021-03-27 PROCEDURE — 99232 PR SUBSEQUENT HOSPITAL CARE,LEVL II: ICD-10-PCS | Mod: ,,, | Performed by: INTERNAL MEDICINE

## 2021-03-27 PROCEDURE — 83735 ASSAY OF MAGNESIUM: CPT | Performed by: STUDENT IN AN ORGANIZED HEALTH CARE EDUCATION/TRAINING PROGRAM

## 2021-03-27 PROCEDURE — 80069 RENAL FUNCTION PANEL: CPT | Performed by: STUDENT IN AN ORGANIZED HEALTH CARE EDUCATION/TRAINING PROGRAM

## 2021-03-27 PROCEDURE — C9399 UNCLASSIFIED DRUGS OR BIOLOG: HCPCS | Performed by: NURSE PRACTITIONER

## 2021-03-27 PROCEDURE — 36415 COLL VENOUS BLD VENIPUNCTURE: CPT | Performed by: STUDENT IN AN ORGANIZED HEALTH CARE EDUCATION/TRAINING PROGRAM

## 2021-03-27 PROCEDURE — 97161 PT EVAL LOW COMPLEX 20 MIN: CPT

## 2021-03-27 PROCEDURE — 85025 COMPLETE CBC W/AUTO DIFF WBC: CPT | Performed by: STUDENT IN AN ORGANIZED HEALTH CARE EDUCATION/TRAINING PROGRAM

## 2021-03-27 PROCEDURE — 99232 PR SUBSEQUENT HOSPITAL CARE,LEVL II: ICD-10-PCS | Mod: ,,, | Performed by: NURSE PRACTITIONER

## 2021-03-27 PROCEDURE — 99232 SBSQ HOSP IP/OBS MODERATE 35: CPT | Mod: ,,, | Performed by: NURSE PRACTITIONER

## 2021-03-27 RX ORDER — NIFEDIPINE 30 MG/1
30 TABLET, EXTENDED RELEASE ORAL DAILY
Status: DISCONTINUED | OUTPATIENT
Start: 2021-03-27 | End: 2021-03-29 | Stop reason: HOSPADM

## 2021-03-27 RX ORDER — INSULIN ASPART 100 [IU]/ML
10 INJECTION, SOLUTION INTRAVENOUS; SUBCUTANEOUS
Status: DISCONTINUED | OUTPATIENT
Start: 2021-03-27 | End: 2021-03-28

## 2021-03-27 RX ADMIN — SODIUM BICARBONATE 650 MG TABLET 1300 MG: at 08:03

## 2021-03-27 RX ADMIN — MYCOPHENOLATE MOFETIL 1000 MG: 250 CAPSULE ORAL at 08:03

## 2021-03-27 RX ADMIN — PANTOPRAZOLE SODIUM 40 MG: 40 TABLET, DELAYED RELEASE ORAL at 05:03

## 2021-03-27 RX ADMIN — LEVETIRACETAM 500 MG: 500 TABLET ORAL at 08:03

## 2021-03-27 RX ADMIN — TACROLIMUS 2 MG: 1 CAPSULE ORAL at 08:03

## 2021-03-27 RX ADMIN — TRAMADOL HYDROCHLORIDE 50 MG: 50 TABLET, COATED ORAL at 08:03

## 2021-03-27 RX ADMIN — NYSTATIN 500000 UNITS: 100000 SUSPENSION ORAL at 08:03

## 2021-03-27 RX ADMIN — SULFAMETHOXAZOLE AND TRIMETHOPRIM 1 TABLET: 400; 80 TABLET ORAL at 08:03

## 2021-03-27 RX ADMIN — INSULIN ASPART 10 UNITS: 100 INJECTION, SOLUTION INTRAVENOUS; SUBCUTANEOUS at 08:03

## 2021-03-27 RX ADMIN — NIFEDIPINE 30 MG: 30 TABLET, FILM COATED, EXTENDED RELEASE ORAL at 02:03

## 2021-03-27 RX ADMIN — NYSTATIN 500000 UNITS: 100000 SUSPENSION ORAL at 02:03

## 2021-03-27 RX ADMIN — METHYLPREDNISOLONE SODIUM SUCCINATE 125 MG: 125 INJECTION, POWDER, FOR SOLUTION INTRAMUSCULAR; INTRAVENOUS at 08:03

## 2021-03-27 RX ADMIN — ACETAMINOPHEN 1000 MG: 500 TABLET ORAL at 05:03

## 2021-03-27 RX ADMIN — INSULIN ASPART 4 UNITS: 100 INJECTION, SOLUTION INTRAVENOUS; SUBCUTANEOUS at 08:03

## 2021-03-27 RX ADMIN — TACROLIMUS 2 MG: 1 CAPSULE ORAL at 05:03

## 2021-03-27 RX ADMIN — HEPARIN SODIUM 5000 UNITS: 5000 INJECTION INTRAVENOUS; SUBCUTANEOUS at 02:03

## 2021-03-27 RX ADMIN — INSULIN ASPART 10 UNITS: 100 INJECTION, SOLUTION INTRAVENOUS; SUBCUTANEOUS at 05:03

## 2021-03-27 RX ADMIN — DOCUSATE SODIUM 100 MG: 100 CAPSULE, LIQUID FILLED ORAL at 02:03

## 2021-03-27 RX ADMIN — ROSUVASTATIN CALCIUM 20 MG: 10 TABLET, FILM COATED ORAL at 08:03

## 2021-03-27 RX ADMIN — DOCUSATE SODIUM 100 MG: 100 CAPSULE, LIQUID FILLED ORAL at 08:03

## 2021-03-27 RX ADMIN — INSULIN ASPART 2 UNITS: 100 INJECTION, SOLUTION INTRAVENOUS; SUBCUTANEOUS at 05:03

## 2021-03-27 RX ADMIN — THERA TABS 1 TABLET: TAB at 08:03

## 2021-03-27 RX ADMIN — HEPARIN SODIUM 5000 UNITS: 5000 INJECTION INTRAVENOUS; SUBCUTANEOUS at 05:03

## 2021-03-27 RX ADMIN — INSULIN ASPART 3 UNITS: 100 INJECTION, SOLUTION INTRAVENOUS; SUBCUTANEOUS at 11:03

## 2021-03-27 RX ADMIN — BISACODYL 10 MG: 5 TABLET, COATED ORAL at 08:03

## 2021-03-27 RX ADMIN — CEFTRIAXONE 2 G: 2 INJECTION, SOLUTION INTRAVENOUS at 02:03

## 2021-03-27 RX ADMIN — LEVOTHYROXINE SODIUM 100 MCG: 100 TABLET ORAL at 08:03

## 2021-03-27 RX ADMIN — MUPIROCIN 1 G: 20 OINTMENT TOPICAL at 08:03

## 2021-03-27 RX ADMIN — INSULIN ASPART 10 UNITS: 100 INJECTION, SOLUTION INTRAVENOUS; SUBCUTANEOUS at 11:03

## 2021-03-27 RX ADMIN — NYSTATIN 500000 UNITS: 100000 SUSPENSION ORAL at 05:03

## 2021-03-27 RX ADMIN — HEPARIN SODIUM 5000 UNITS: 5000 INJECTION INTRAVENOUS; SUBCUTANEOUS at 08:03

## 2021-03-27 RX ADMIN — MONTELUKAST 10 MG: 10 TABLET, FILM COATED ORAL at 08:03

## 2021-03-27 RX ADMIN — INSULIN DETEMIR 12 UNITS: 100 INJECTION, SOLUTION SUBCUTANEOUS at 08:03

## 2021-03-28 LAB
ALBUMIN SERPL BCP-MCNC: 2.8 G/DL (ref 3.5–5.2)
ANION GAP SERPL CALC-SCNC: 13 MMOL/L (ref 8–16)
ANISOCYTOSIS BLD QL SMEAR: SLIGHT
BASO STIPL BLD QL SMEAR: ABNORMAL
BASOPHILS # BLD AUTO: 0.04 K/UL (ref 0–0.2)
BASOPHILS NFR BLD: 0.2 % (ref 0–1.9)
BUN SERPL-MCNC: 45 MG/DL (ref 8–23)
CALCIUM SERPL-MCNC: 8.8 MG/DL (ref 8.7–10.5)
CHLORIDE SERPL-SCNC: 102 MMOL/L (ref 95–110)
CO2 SERPL-SCNC: 28 MMOL/L (ref 23–29)
CREAT SERPL-MCNC: 2.9 MG/DL (ref 0.5–1.4)
CREAT UR-MCNC: 98 MG/DL (ref 15–325)
DIFFERENTIAL METHOD: ABNORMAL
EOSINOPHIL # BLD AUTO: 0 K/UL (ref 0–0.5)
EOSINOPHIL NFR BLD: 0.2 % (ref 0–8)
ERYTHROCYTE [DISTWIDTH] IN BLOOD BY AUTOMATED COUNT: 15.2 % (ref 11.5–14.5)
EST. GFR  (AFRICAN AMERICAN): 18 ML/MIN/1.73 M^2
EST. GFR  (NON AFRICAN AMERICAN): 15.6 ML/MIN/1.73 M^2
GLUCOSE SERPL-MCNC: 215 MG/DL (ref 70–110)
HCT VFR BLD AUTO: 26.7 % (ref 37–48.5)
HGB BLD-MCNC: 8.2 G/DL (ref 12–16)
IMM GRANULOCYTES # BLD AUTO: 0.2 K/UL (ref 0–0.04)
IMM GRANULOCYTES NFR BLD AUTO: 1 % (ref 0–0.5)
LYMPHOCYTES # BLD AUTO: 1.9 K/UL (ref 1–4.8)
LYMPHOCYTES NFR BLD: 9.1 % (ref 18–48)
MAGNESIUM SERPL-MCNC: 2.2 MG/DL (ref 1.6–2.6)
MCH RBC QN AUTO: 29.3 PG (ref 27–31)
MCHC RBC AUTO-ENTMCNC: 30.7 G/DL (ref 32–36)
MCV RBC AUTO: 95 FL (ref 82–98)
MONOCYTES # BLD AUTO: 2.3 K/UL (ref 0.3–1)
MONOCYTES NFR BLD: 11 % (ref 4–15)
NEUTROPHILS # BLD AUTO: 16.4 K/UL (ref 1.8–7.7)
NEUTROPHILS NFR BLD: 78.5 % (ref 38–73)
NRBC BLD-RTO: 0 /100 WBC
PHOSPHATE SERPL-MCNC: 2.8 MG/DL (ref 2.7–4.5)
PLATELET # BLD AUTO: 263 K/UL (ref 150–350)
PLATELET BLD QL SMEAR: ABNORMAL
PMV BLD AUTO: 9.1 FL (ref 9.2–12.9)
POCT GLUCOSE: 177 MG/DL (ref 70–110)
POCT GLUCOSE: 181 MG/DL (ref 70–110)
POCT GLUCOSE: 227 MG/DL (ref 70–110)
POCT GLUCOSE: 232 MG/DL (ref 70–110)
POCT GLUCOSE: 237 MG/DL (ref 70–110)
POLYCHROMASIA BLD QL SMEAR: ABNORMAL
POTASSIUM SERPL-SCNC: 3.2 MMOL/L (ref 3.5–5.1)
PROT UR-MCNC: 56 MG/DL (ref 0–15)
PROT/CREAT UR: 0.57 MG/G{CREAT} (ref 0–0.2)
RBC # BLD AUTO: 2.8 M/UL (ref 4–5.4)
SODIUM SERPL-SCNC: 143 MMOL/L (ref 136–145)
TACROLIMUS BLD-MCNC: 5.9 NG/ML (ref 5–15)
WBC # BLD AUTO: 20.89 K/UL (ref 3.9–12.7)

## 2021-03-28 PROCEDURE — 63600175 PHARM REV CODE 636 W HCPCS: Performed by: PHYSICIAN ASSISTANT

## 2021-03-28 PROCEDURE — 20600001 HC STEP DOWN PRIVATE ROOM

## 2021-03-28 PROCEDURE — 25000003 PHARM REV CODE 250: Performed by: PHYSICIAN ASSISTANT

## 2021-03-28 PROCEDURE — 85025 COMPLETE CBC W/AUTO DIFF WBC: CPT | Performed by: STUDENT IN AN ORGANIZED HEALTH CARE EDUCATION/TRAINING PROGRAM

## 2021-03-28 PROCEDURE — 25000003 PHARM REV CODE 250: Performed by: TRANSPLANT SURGERY

## 2021-03-28 PROCEDURE — 80069 RENAL FUNCTION PANEL: CPT | Performed by: STUDENT IN AN ORGANIZED HEALTH CARE EDUCATION/TRAINING PROGRAM

## 2021-03-28 PROCEDURE — 25000003 PHARM REV CODE 250: Performed by: INTERNAL MEDICINE

## 2021-03-28 PROCEDURE — 99232 PR SUBSEQUENT HOSPITAL CARE,LEVL II: ICD-10-PCS | Mod: ,,, | Performed by: INTERNAL MEDICINE

## 2021-03-28 PROCEDURE — 63600175 PHARM REV CODE 636 W HCPCS: Performed by: STUDENT IN AN ORGANIZED HEALTH CARE EDUCATION/TRAINING PROGRAM

## 2021-03-28 PROCEDURE — 80197 ASSAY OF TACROLIMUS: CPT | Performed by: STUDENT IN AN ORGANIZED HEALTH CARE EDUCATION/TRAINING PROGRAM

## 2021-03-28 PROCEDURE — 83735 ASSAY OF MAGNESIUM: CPT | Performed by: STUDENT IN AN ORGANIZED HEALTH CARE EDUCATION/TRAINING PROGRAM

## 2021-03-28 PROCEDURE — 63600175 PHARM REV CODE 636 W HCPCS: Performed by: INTERNAL MEDICINE

## 2021-03-28 PROCEDURE — 99232 SBSQ HOSP IP/OBS MODERATE 35: CPT | Mod: ,,, | Performed by: INTERNAL MEDICINE

## 2021-03-28 PROCEDURE — 84156 ASSAY OF PROTEIN URINE: CPT | Performed by: PHYSICIAN ASSISTANT

## 2021-03-28 PROCEDURE — 36415 COLL VENOUS BLD VENIPUNCTURE: CPT | Performed by: STUDENT IN AN ORGANIZED HEALTH CARE EDUCATION/TRAINING PROGRAM

## 2021-03-28 PROCEDURE — 25000003 PHARM REV CODE 250: Performed by: STUDENT IN AN ORGANIZED HEALTH CARE EDUCATION/TRAINING PROGRAM

## 2021-03-28 RX ORDER — BISACODYL 10 MG
10 SUPPOSITORY, RECTAL RECTAL ONCE
Status: COMPLETED | OUTPATIENT
Start: 2021-03-28 | End: 2021-03-28

## 2021-03-28 RX ORDER — TACROLIMUS 1 MG/1
3 CAPSULE ORAL 2 TIMES DAILY
Status: DISCONTINUED | OUTPATIENT
Start: 2021-03-28 | End: 2021-03-29

## 2021-03-28 RX ORDER — POTASSIUM CHLORIDE 20 MEQ/1
40 TABLET, EXTENDED RELEASE ORAL ONCE
Status: COMPLETED | OUTPATIENT
Start: 2021-03-28 | End: 2021-03-28

## 2021-03-28 RX ORDER — INSULIN ASPART 100 [IU]/ML
12 INJECTION, SOLUTION INTRAVENOUS; SUBCUTANEOUS
Status: DISCONTINUED | OUTPATIENT
Start: 2021-03-28 | End: 2021-03-29 | Stop reason: HOSPADM

## 2021-03-28 RX ADMIN — THERA TABS 1 TABLET: TAB at 08:03

## 2021-03-28 RX ADMIN — LEVETIRACETAM 500 MG: 500 TABLET ORAL at 08:03

## 2021-03-28 RX ADMIN — MUPIROCIN 1 G: 20 OINTMENT TOPICAL at 08:03

## 2021-03-28 RX ADMIN — PREDNISONE 20 MG: 20 TABLET ORAL at 08:03

## 2021-03-28 RX ADMIN — INSULIN ASPART 2 UNITS: 100 INJECTION, SOLUTION INTRAVENOUS; SUBCUTANEOUS at 08:03

## 2021-03-28 RX ADMIN — BISACODYL 10 MG: 5 TABLET, COATED ORAL at 07:03

## 2021-03-28 RX ADMIN — TRAMADOL HYDROCHLORIDE 50 MG: 50 TABLET, COATED ORAL at 08:03

## 2021-03-28 RX ADMIN — PANTOPRAZOLE SODIUM 40 MG: 40 TABLET, DELAYED RELEASE ORAL at 05:03

## 2021-03-28 RX ADMIN — INSULIN ASPART 2 UNITS: 100 INJECTION, SOLUTION INTRAVENOUS; SUBCUTANEOUS at 04:03

## 2021-03-28 RX ADMIN — HEPARIN SODIUM 5000 UNITS: 5000 INJECTION INTRAVENOUS; SUBCUTANEOUS at 02:03

## 2021-03-28 RX ADMIN — SODIUM BICARBONATE 650 MG TABLET 1300 MG: at 08:03

## 2021-03-28 RX ADMIN — INSULIN ASPART 10 UNITS: 100 INJECTION, SOLUTION INTRAVENOUS; SUBCUTANEOUS at 08:03

## 2021-03-28 RX ADMIN — NYSTATIN 500000 UNITS: 100000 SUSPENSION ORAL at 07:03

## 2021-03-28 RX ADMIN — NYSTATIN 500000 UNITS: 100000 SUSPENSION ORAL at 08:03

## 2021-03-28 RX ADMIN — TACROLIMUS 2 MG: 1 CAPSULE ORAL at 08:03

## 2021-03-28 RX ADMIN — CEFTRIAXONE 2 G: 2 INJECTION, SOLUTION INTRAVENOUS at 02:03

## 2021-03-28 RX ADMIN — HEPARIN SODIUM 5000 UNITS: 5000 INJECTION INTRAVENOUS; SUBCUTANEOUS at 05:03

## 2021-03-28 RX ADMIN — POTASSIUM CHLORIDE 40 MEQ: 1500 TABLET, EXTENDED RELEASE ORAL at 02:03

## 2021-03-28 RX ADMIN — HEPARIN SODIUM 5000 UNITS: 5000 INJECTION INTRAVENOUS; SUBCUTANEOUS at 07:03

## 2021-03-28 RX ADMIN — MYCOPHENOLATE MOFETIL 1000 MG: 250 CAPSULE ORAL at 07:03

## 2021-03-28 RX ADMIN — DOCUSATE SODIUM 100 MG: 100 CAPSULE, LIQUID FILLED ORAL at 08:03

## 2021-03-28 RX ADMIN — TACROLIMUS 3 MG: 1 CAPSULE ORAL at 04:03

## 2021-03-28 RX ADMIN — DOCUSATE SODIUM 100 MG: 100 CAPSULE, LIQUID FILLED ORAL at 07:03

## 2021-03-28 RX ADMIN — NYSTATIN 500000 UNITS: 100000 SUSPENSION ORAL at 02:03

## 2021-03-28 RX ADMIN — INSULIN DETEMIR 12 UNITS: 100 INJECTION, SOLUTION SUBCUTANEOUS at 08:03

## 2021-03-28 RX ADMIN — LEVETIRACETAM 500 MG: 500 TABLET ORAL at 07:03

## 2021-03-28 RX ADMIN — INSULIN ASPART 2 UNITS: 100 INJECTION, SOLUTION INTRAVENOUS; SUBCUTANEOUS at 12:03

## 2021-03-28 RX ADMIN — MYCOPHENOLATE MOFETIL 1000 MG: 250 CAPSULE ORAL at 08:03

## 2021-03-28 RX ADMIN — MONTELUKAST 10 MG: 10 TABLET, FILM COATED ORAL at 08:03

## 2021-03-28 RX ADMIN — MUPIROCIN 1 G: 20 OINTMENT TOPICAL at 07:03

## 2021-03-28 RX ADMIN — INSULIN ASPART 12 UNITS: 100 INJECTION, SOLUTION INTRAVENOUS; SUBCUTANEOUS at 04:03

## 2021-03-28 RX ADMIN — INSULIN ASPART 10 UNITS: 100 INJECTION, SOLUTION INTRAVENOUS; SUBCUTANEOUS at 12:03

## 2021-03-28 RX ADMIN — SULFAMETHOXAZOLE AND TRIMETHOPRIM 1 TABLET: 400; 80 TABLET ORAL at 08:03

## 2021-03-28 RX ADMIN — ROSUVASTATIN CALCIUM 20 MG: 10 TABLET, FILM COATED ORAL at 08:03

## 2021-03-28 RX ADMIN — NYSTATIN 500000 UNITS: 100000 SUSPENSION ORAL at 04:03

## 2021-03-28 RX ADMIN — BISACODYL 10 MG: 10 SUPPOSITORY RECTAL at 02:03

## 2021-03-28 RX ADMIN — NIFEDIPINE 30 MG: 30 TABLET, FILM COATED, EXTENDED RELEASE ORAL at 08:03

## 2021-03-28 RX ADMIN — LEVOTHYROXINE SODIUM 100 MCG: 100 TABLET ORAL at 08:03

## 2021-03-28 RX ADMIN — DOCUSATE SODIUM 100 MG: 100 CAPSULE, LIQUID FILLED ORAL at 02:03

## 2021-03-28 RX ADMIN — PANTOPRAZOLE SODIUM 40 MG: 40 TABLET, DELAYED RELEASE ORAL at 04:03

## 2021-03-29 VITALS
HEIGHT: 63 IN | SYSTOLIC BLOOD PRESSURE: 144 MMHG | WEIGHT: 153.56 LBS | BODY MASS INDEX: 27.21 KG/M2 | TEMPERATURE: 98 F | DIASTOLIC BLOOD PRESSURE: 84 MMHG | HEART RATE: 90 BPM | RESPIRATION RATE: 17 BRPM | OXYGEN SATURATION: 97 %

## 2021-03-29 DIAGNOSIS — Z94.0 KIDNEY REPLACED BY TRANSPLANT: Primary | ICD-10-CM

## 2021-03-29 LAB
ALBUMIN SERPL BCP-MCNC: 2.7 G/DL (ref 3.5–5.2)
ANION GAP SERPL CALC-SCNC: 11 MMOL/L (ref 8–16)
BACTERIA FLD AEROBE CULT: NO GROWTH
BASOPHILS # BLD AUTO: 0.08 K/UL (ref 0–0.2)
BASOPHILS NFR BLD: 0.5 % (ref 0–1.9)
BUN SERPL-MCNC: 36 MG/DL (ref 8–23)
CALCIUM SERPL-MCNC: 8.6 MG/DL (ref 8.7–10.5)
CHLORIDE SERPL-SCNC: 106 MMOL/L (ref 95–110)
CO2 SERPL-SCNC: 27 MMOL/L (ref 23–29)
CREAT SERPL-MCNC: 1.9 MG/DL (ref 0.5–1.4)
CREAT UR-MCNC: 99 MG/DL (ref 15–325)
DIFFERENTIAL METHOD: ABNORMAL
EOSINOPHIL # BLD AUTO: 0.2 K/UL (ref 0–0.5)
EOSINOPHIL NFR BLD: 1 % (ref 0–8)
ERYTHROCYTE [DISTWIDTH] IN BLOOD BY AUTOMATED COUNT: 15.1 % (ref 11.5–14.5)
EST. GFR  (AFRICAN AMERICAN): 29.9 ML/MIN/1.73 M^2
EST. GFR  (NON AFRICAN AMERICAN): 26 ML/MIN/1.73 M^2
GLUCOSE SERPL-MCNC: 185 MG/DL (ref 70–110)
GRAM STN SPEC: NORMAL
HCT VFR BLD AUTO: 26.1 % (ref 37–48.5)
HCV RNA SERPL NAA+PROBE-LOG IU: <1.08 LOG (10) IU/ML
HCV RNA SERPL QL NAA+PROBE: NOT DETECTED IU/ML
HCV RNA SPEC NAA+PROBE-ACNC: <12 IU/ML
HGB BLD-MCNC: 8.2 G/DL (ref 12–16)
IMM GRANULOCYTES # BLD AUTO: 0.26 K/UL (ref 0–0.04)
IMM GRANULOCYTES NFR BLD AUTO: 1.6 % (ref 0–0.5)
LYMPHOCYTES # BLD AUTO: 2 K/UL (ref 1–4.8)
LYMPHOCYTES NFR BLD: 11.9 % (ref 18–48)
MAGNESIUM SERPL-MCNC: 2.1 MG/DL (ref 1.6–2.6)
MCH RBC QN AUTO: 29.3 PG (ref 27–31)
MCHC RBC AUTO-ENTMCNC: 31.4 G/DL (ref 32–36)
MCV RBC AUTO: 93 FL (ref 82–98)
MONOCYTES # BLD AUTO: 2 K/UL (ref 0.3–1)
MONOCYTES NFR BLD: 11.9 % (ref 4–15)
NEUTROPHILS # BLD AUTO: 12.2 K/UL (ref 1.8–7.7)
NEUTROPHILS NFR BLD: 73.1 % (ref 38–73)
NRBC BLD-RTO: 0 /100 WBC
PHOSPHATE SERPL-MCNC: 2 MG/DL (ref 2.7–4.5)
PLATELET # BLD AUTO: 255 K/UL (ref 150–450)
PMV BLD AUTO: 9.4 FL (ref 9.2–12.9)
POCT GLUCOSE: 142 MG/DL (ref 70–110)
POCT GLUCOSE: 203 MG/DL (ref 70–110)
POTASSIUM SERPL-SCNC: 3.2 MMOL/L (ref 3.5–5.1)
PROT UR-MCNC: 96 MG/DL (ref 0–15)
PROT/CREAT UR: 0.97 MG/G{CREAT} (ref 0–0.2)
RBC # BLD AUTO: 2.8 M/UL (ref 4–5.4)
SODIUM SERPL-SCNC: 144 MMOL/L (ref 136–145)
TACROLIMUS BLD-MCNC: 4.7 NG/ML (ref 5–15)
WBC # BLD AUTO: 16.69 K/UL (ref 3.9–12.7)

## 2021-03-29 PROCEDURE — 99232 SBSQ HOSP IP/OBS MODERATE 35: CPT | Mod: ,,, | Performed by: NURSE PRACTITIONER

## 2021-03-29 PROCEDURE — 85025 COMPLETE CBC W/AUTO DIFF WBC: CPT | Performed by: STUDENT IN AN ORGANIZED HEALTH CARE EDUCATION/TRAINING PROGRAM

## 2021-03-29 PROCEDURE — 80197 ASSAY OF TACROLIMUS: CPT | Performed by: STUDENT IN AN ORGANIZED HEALTH CARE EDUCATION/TRAINING PROGRAM

## 2021-03-29 PROCEDURE — 63600175 PHARM REV CODE 636 W HCPCS: Performed by: STUDENT IN AN ORGANIZED HEALTH CARE EDUCATION/TRAINING PROGRAM

## 2021-03-29 PROCEDURE — 63600175 PHARM REV CODE 636 W HCPCS: Performed by: PHYSICIAN ASSISTANT

## 2021-03-29 PROCEDURE — 83735 ASSAY OF MAGNESIUM: CPT | Performed by: STUDENT IN AN ORGANIZED HEALTH CARE EDUCATION/TRAINING PROGRAM

## 2021-03-29 PROCEDURE — 63600175 PHARM REV CODE 636 W HCPCS: Performed by: INTERNAL MEDICINE

## 2021-03-29 PROCEDURE — 99239 PR HOSPITAL DISCHARGE DAY,>30 MIN: ICD-10-PCS | Mod: 24,,, | Performed by: PHYSICIAN ASSISTANT

## 2021-03-29 PROCEDURE — 25000003 PHARM REV CODE 250: Performed by: PHYSICIAN ASSISTANT

## 2021-03-29 PROCEDURE — 1111F DSCHRG MED/CURRENT MED MERGE: CPT | Mod: CPTII,,, | Performed by: PHYSICIAN ASSISTANT

## 2021-03-29 PROCEDURE — 25000003 PHARM REV CODE 250: Performed by: STUDENT IN AN ORGANIZED HEALTH CARE EDUCATION/TRAINING PROGRAM

## 2021-03-29 PROCEDURE — 25000003 PHARM REV CODE 250: Performed by: TRANSPLANT SURGERY

## 2021-03-29 PROCEDURE — 1111F PR DISCHARGE MEDS RECONCILED W/ CURRENT OUTPATIENT MED LIST: ICD-10-PCS | Mod: CPTII,,, | Performed by: PHYSICIAN ASSISTANT

## 2021-03-29 PROCEDURE — 99232 PR SUBSEQUENT HOSPITAL CARE,LEVL II: ICD-10-PCS | Mod: ,,, | Performed by: NURSE PRACTITIONER

## 2021-03-29 PROCEDURE — 80069 RENAL FUNCTION PANEL: CPT | Performed by: STUDENT IN AN ORGANIZED HEALTH CARE EDUCATION/TRAINING PROGRAM

## 2021-03-29 PROCEDURE — 25000003 PHARM REV CODE 250: Performed by: INTERNAL MEDICINE

## 2021-03-29 PROCEDURE — 36415 COLL VENOUS BLD VENIPUNCTURE: CPT | Performed by: STUDENT IN AN ORGANIZED HEALTH CARE EDUCATION/TRAINING PROGRAM

## 2021-03-29 PROCEDURE — 82570 ASSAY OF URINE CREATININE: CPT | Performed by: PHYSICIAN ASSISTANT

## 2021-03-29 PROCEDURE — 99239 HOSP IP/OBS DSCHRG MGMT >30: CPT | Mod: 24,,, | Performed by: PHYSICIAN ASSISTANT

## 2021-03-29 RX ORDER — TRAMADOL HYDROCHLORIDE 50 MG/1
50 TABLET ORAL EVERY 8 HOURS PRN
Qty: 15 TABLET | Refills: 0 | Status: SHIPPED | OUTPATIENT
Start: 2021-03-29 | End: 2021-04-14

## 2021-03-29 RX ORDER — TACROLIMUS 1 MG/1
4 CAPSULE ORAL EVERY 12 HOURS
Qty: 240 CAPSULE | Refills: 11 | Status: SHIPPED | OUTPATIENT
Start: 2021-03-29 | End: 2021-03-30 | Stop reason: DRUGHIGH

## 2021-03-29 RX ORDER — TACROLIMUS 1 MG/1
4 CAPSULE ORAL 2 TIMES DAILY
Status: DISCONTINUED | OUTPATIENT
Start: 2021-03-29 | End: 2021-03-29 | Stop reason: HOSPADM

## 2021-03-29 RX ORDER — INSULIN ASPART 100 [IU]/ML
12 INJECTION, SOLUTION INTRAVENOUS; SUBCUTANEOUS 3 TIMES DAILY
Qty: 15 ML | Refills: 1 | Status: SHIPPED | OUTPATIENT
Start: 2021-03-29 | End: 2021-04-01 | Stop reason: SDUPTHER

## 2021-03-29 RX ORDER — LIDOCAINE HYDROCHLORIDE 10 MG/ML
10 INJECTION INFILTRATION; PERINEURAL ONCE
Status: COMPLETED | OUTPATIENT
Start: 2021-03-29 | End: 2021-03-29

## 2021-03-29 RX ORDER — NIFEDIPINE 30 MG/1
30 TABLET, EXTENDED RELEASE ORAL DAILY
Qty: 30 TABLET | Refills: 6 | Status: SHIPPED | OUTPATIENT
Start: 2021-03-30 | End: 2021-03-30

## 2021-03-29 RX ORDER — BISACODYL 5 MG
10 TABLET, DELAYED RELEASE (ENTERIC COATED) ORAL DAILY PRN
Refills: 0 | COMMUNITY
Start: 2021-03-29 | End: 2022-01-11

## 2021-03-29 RX ORDER — NAPROXEN SODIUM 220 MG/1
81 TABLET, FILM COATED ORAL DAILY
Status: DISCONTINUED | OUTPATIENT
Start: 2021-03-29 | End: 2021-03-29 | Stop reason: HOSPADM

## 2021-03-29 RX ORDER — POTASSIUM CHLORIDE 750 MG/1
40 CAPSULE, EXTENDED RELEASE ORAL ONCE
Status: COMPLETED | OUTPATIENT
Start: 2021-03-29 | End: 2021-03-29

## 2021-03-29 RX ORDER — PEN NEEDLE, DIABETIC 30 GX3/16"
1 NEEDLE, DISPOSABLE MISCELLANEOUS 4 TIMES DAILY
Qty: 100 EACH | Refills: 0 | Status: SHIPPED | OUTPATIENT
Start: 2021-03-29 | End: 2021-04-14 | Stop reason: SDUPTHER

## 2021-03-29 RX ADMIN — THERA TABS 1 TABLET: TAB at 08:03

## 2021-03-29 RX ADMIN — POTASSIUM CHLORIDE 40 MEQ: 10 CAPSULE, COATED, EXTENDED RELEASE ORAL at 11:03

## 2021-03-29 RX ADMIN — INSULIN DETEMIR 12 UNITS: 100 INJECTION, SOLUTION SUBCUTANEOUS at 08:03

## 2021-03-29 RX ADMIN — LEVETIRACETAM 500 MG: 500 TABLET ORAL at 08:03

## 2021-03-29 RX ADMIN — DOCUSATE SODIUM 100 MG: 100 CAPSULE, LIQUID FILLED ORAL at 08:03

## 2021-03-29 RX ADMIN — NYSTATIN 500000 UNITS: 100000 SUSPENSION ORAL at 08:03

## 2021-03-29 RX ADMIN — MUPIROCIN 1 G: 20 OINTMENT TOPICAL at 08:03

## 2021-03-29 RX ADMIN — DIBASIC SODIUM PHOSPHATE, MONOBASIC POTASSIUM PHOSPHATE AND MONOBASIC SODIUM PHOSPHATE 2 TABLET: 852; 155; 130 TABLET ORAL at 11:03

## 2021-03-29 RX ADMIN — TACROLIMUS 3 MG: 1 CAPSULE ORAL at 08:03

## 2021-03-29 RX ADMIN — INSULIN ASPART 12 UNITS: 100 INJECTION, SOLUTION INTRAVENOUS; SUBCUTANEOUS at 08:03

## 2021-03-29 RX ADMIN — PREDNISONE 20 MG: 20 TABLET ORAL at 08:03

## 2021-03-29 RX ADMIN — LIDOCAINE HYDROCHLORIDE 10 ML: 10 INJECTION, SOLUTION INFILTRATION; PERINEURAL at 12:03

## 2021-03-29 RX ADMIN — MYCOPHENOLATE MOFETIL 1000 MG: 250 CAPSULE ORAL at 08:03

## 2021-03-29 RX ADMIN — PANTOPRAZOLE SODIUM 40 MG: 40 TABLET, DELAYED RELEASE ORAL at 05:03

## 2021-03-29 RX ADMIN — LEVOTHYROXINE SODIUM 100 MCG: 100 TABLET ORAL at 08:03

## 2021-03-29 RX ADMIN — NIFEDIPINE 30 MG: 30 TABLET, FILM COATED, EXTENDED RELEASE ORAL at 08:03

## 2021-03-29 RX ADMIN — SODIUM CHLORIDE 20 MG: 9 INJECTION, SOLUTION INTRAVENOUS at 02:03

## 2021-03-29 RX ADMIN — ROSUVASTATIN CALCIUM 20 MG: 10 TABLET, FILM COATED ORAL at 08:03

## 2021-03-29 RX ADMIN — INSULIN ASPART 12 UNITS: 100 INJECTION, SOLUTION INTRAVENOUS; SUBCUTANEOUS at 11:03

## 2021-03-29 RX ADMIN — ASPIRIN 81 MG CHEWABLE TABLET 81 MG: 81 TABLET CHEWABLE at 11:03

## 2021-03-29 RX ADMIN — MONTELUKAST 10 MG: 10 TABLET, FILM COATED ORAL at 08:03

## 2021-03-29 RX ADMIN — HEPARIN SODIUM 5000 UNITS: 5000 INJECTION INTRAVENOUS; SUBCUTANEOUS at 05:03

## 2021-03-29 RX ADMIN — INSULIN ASPART 2 UNITS: 100 INJECTION, SOLUTION INTRAVENOUS; SUBCUTANEOUS at 08:03

## 2021-03-29 RX ADMIN — SULFAMETHOXAZOLE AND TRIMETHOPRIM 1 TABLET: 400; 80 TABLET ORAL at 08:03

## 2021-03-30 ENCOUNTER — PATIENT MESSAGE (OUTPATIENT)
Dept: TRANSPLANT | Facility: CLINIC | Age: 71
End: 2021-03-30

## 2021-03-30 ENCOUNTER — CLINICAL SUPPORT (OUTPATIENT)
Dept: TRANSPLANT | Facility: CLINIC | Age: 71
End: 2021-03-30
Attending: INTERNAL MEDICINE
Payer: MEDICARE

## 2021-03-30 ENCOUNTER — LAB VISIT (OUTPATIENT)
Dept: LAB | Facility: HOSPITAL | Age: 71
End: 2021-03-30
Attending: INTERNAL MEDICINE
Payer: MEDICARE

## 2021-03-30 VITALS
TEMPERATURE: 98 F | RESPIRATION RATE: 18 BRPM | HEART RATE: 96 BPM | HEIGHT: 63 IN | DIASTOLIC BLOOD PRESSURE: 85 MMHG | OXYGEN SATURATION: 100 % | BODY MASS INDEX: 27.65 KG/M2 | SYSTOLIC BLOOD PRESSURE: 181 MMHG | WEIGHT: 156.06 LBS

## 2021-03-30 DIAGNOSIS — Z94.0 KIDNEY REPLACED BY TRANSPLANT: Primary | ICD-10-CM

## 2021-03-30 DIAGNOSIS — Z94.0 KIDNEY REPLACED BY TRANSPLANT: ICD-10-CM

## 2021-03-30 DIAGNOSIS — I10 ESSENTIAL HYPERTENSION: Primary | ICD-10-CM

## 2021-03-30 LAB
ALBUMIN SERPL BCP-MCNC: 2.8 G/DL (ref 3.5–5.2)
ANION GAP SERPL CALC-SCNC: 11 MMOL/L (ref 8–16)
BASOPHILS # BLD AUTO: 0.07 K/UL (ref 0–0.2)
BASOPHILS NFR BLD: 0.4 % (ref 0–1.9)
BUN SERPL-MCNC: 30 MG/DL (ref 8–23)
CALCIUM SERPL-MCNC: 8.7 MG/DL (ref 8.7–10.5)
CHLORIDE SERPL-SCNC: 106 MMOL/L (ref 95–110)
CO2 SERPL-SCNC: 27 MMOL/L (ref 23–29)
CREAT SERPL-MCNC: 1.5 MG/DL (ref 0.5–1.4)
DIFFERENTIAL METHOD: ABNORMAL
EOSINOPHIL # BLD AUTO: 0.2 K/UL (ref 0–0.5)
EOSINOPHIL NFR BLD: 1.5 % (ref 0–8)
ERYTHROCYTE [DISTWIDTH] IN BLOOD BY AUTOMATED COUNT: 15.2 % (ref 11.5–14.5)
EST. GFR  (AFRICAN AMERICAN): 39.8 ML/MIN/1.73 M^2
EST. GFR  (NON AFRICAN AMERICAN): 34.6 ML/MIN/1.73 M^2
GLUCOSE SERPL-MCNC: 248 MG/DL (ref 70–110)
HCT VFR BLD AUTO: 27.3 % (ref 37–48.5)
HGB BLD-MCNC: 8.5 G/DL (ref 12–16)
IMM GRANULOCYTES # BLD AUTO: 0.41 K/UL (ref 0–0.04)
IMM GRANULOCYTES NFR BLD AUTO: 2.5 % (ref 0–0.5)
LYMPHOCYTES # BLD AUTO: 1.6 K/UL (ref 1–4.8)
LYMPHOCYTES NFR BLD: 10.1 % (ref 18–48)
MAGNESIUM SERPL-MCNC: 1.8 MG/DL (ref 1.6–2.6)
MCH RBC QN AUTO: 29.1 PG (ref 27–31)
MCHC RBC AUTO-ENTMCNC: 31.1 G/DL (ref 32–36)
MCV RBC AUTO: 94 FL (ref 82–98)
MONOCYTES # BLD AUTO: 1.9 K/UL (ref 0.3–1)
MONOCYTES NFR BLD: 11.9 % (ref 4–15)
NEUTROPHILS # BLD AUTO: 11.9 K/UL (ref 1.8–7.7)
NEUTROPHILS NFR BLD: 73.6 % (ref 38–73)
NRBC BLD-RTO: 0 /100 WBC
PHOSPHATE SERPL-MCNC: 2.3 MG/DL (ref 2.7–4.5)
PLATELET # BLD AUTO: 279 K/UL (ref 150–450)
PMV BLD AUTO: 9.2 FL (ref 9.2–12.9)
POTASSIUM SERPL-SCNC: 3.7 MMOL/L (ref 3.5–5.1)
RBC # BLD AUTO: 2.92 M/UL (ref 4–5.4)
SODIUM SERPL-SCNC: 144 MMOL/L (ref 136–145)
TACROLIMUS BLD-MCNC: 5.1 NG/ML (ref 5–15)
WBC # BLD AUTO: 16.11 K/UL (ref 3.9–12.7)

## 2021-03-30 PROCEDURE — 85025 COMPLETE CBC W/AUTO DIFF WBC: CPT | Performed by: INTERNAL MEDICINE

## 2021-03-30 PROCEDURE — 99999 PR PBB SHADOW E&M-EST. PATIENT-LVL IV: CPT | Mod: PBBFAC,,,

## 2021-03-30 PROCEDURE — 80069 RENAL FUNCTION PANEL: CPT | Performed by: INTERNAL MEDICINE

## 2021-03-30 PROCEDURE — 80197 ASSAY OF TACROLIMUS: CPT | Performed by: INTERNAL MEDICINE

## 2021-03-30 PROCEDURE — 36415 COLL VENOUS BLD VENIPUNCTURE: CPT | Performed by: INTERNAL MEDICINE

## 2021-03-30 PROCEDURE — 83735 ASSAY OF MAGNESIUM: CPT | Performed by: INTERNAL MEDICINE

## 2021-03-30 PROCEDURE — 99999 PR PBB SHADOW E&M-EST. PATIENT-LVL IV: ICD-10-PCS | Mod: PBBFAC,,,

## 2021-03-30 RX ORDER — NIFEDIPINE 30 MG/1
60 TABLET, EXTENDED RELEASE ORAL DAILY
Qty: 30 TABLET | Refills: 6 | Status: SHIPPED | OUTPATIENT
Start: 2021-03-30 | End: 2021-04-01 | Stop reason: SDUPTHER

## 2021-03-30 RX ORDER — TACROLIMUS 1 MG/1
5 CAPSULE ORAL EVERY 12 HOURS
Qty: 300 CAPSULE | Refills: 11 | Status: SHIPPED | OUTPATIENT
Start: 2021-03-30 | End: 2021-04-01 | Stop reason: SDUPTHER

## 2021-03-30 NOTE — TELEPHONE ENCOUNTER
----- Message from Dino Torres MD sent at 3/30/2021  9:22 AM CDT -----  Please make sure she follows with endocrine for DM management

## 2021-03-30 NOTE — TELEPHONE ENCOUNTER
----- Message from Dino Torres MD sent at 3/30/2021 12:20 PM CDT -----  Please increase prograf to 5 mg po bid

## 2021-03-31 ENCOUNTER — OFFICE VISIT (OUTPATIENT)
Dept: TRANSPLANT | Facility: CLINIC | Age: 71
End: 2021-03-31
Attending: INTERNAL MEDICINE
Payer: MEDICARE

## 2021-03-31 VITALS
BODY MASS INDEX: 27.85 KG/M2 | RESPIRATION RATE: 18 BRPM | HEART RATE: 106 BPM | HEIGHT: 63 IN | SYSTOLIC BLOOD PRESSURE: 140 MMHG | DIASTOLIC BLOOD PRESSURE: 73 MMHG | OXYGEN SATURATION: 100 % | WEIGHT: 157.19 LBS

## 2021-03-31 DIAGNOSIS — Z79.899 IMMUNOSUPPRESSIVE MANAGEMENT ENCOUNTER FOLLOWING KIDNEY TRANSPLANT: ICD-10-CM

## 2021-03-31 DIAGNOSIS — T86.10 COMPLICATION OF TRANSPLANTED KIDNEY, UNSPECIFIED COMPLICATION: ICD-10-CM

## 2021-03-31 DIAGNOSIS — Z94.0 IMMUNOSUPPRESSIVE MANAGEMENT ENCOUNTER FOLLOWING KIDNEY TRANSPLANT: ICD-10-CM

## 2021-03-31 DIAGNOSIS — E78.49 STEROID-INDUCED HYPERLIPIDEMIA: ICD-10-CM

## 2021-03-31 DIAGNOSIS — Z91.89 AT RISK FOR INFECTION TRANSMITTED FROM DONOR: Primary | ICD-10-CM

## 2021-03-31 DIAGNOSIS — Z57.8 EMPLOYEE EXPOSURE TO BLOOD: ICD-10-CM

## 2021-03-31 DIAGNOSIS — R80.9 PROTEINURIA, UNSPECIFIED TYPE: ICD-10-CM

## 2021-03-31 DIAGNOSIS — T38.0X5A STEROID-INDUCED HYPERLIPIDEMIA: ICD-10-CM

## 2021-03-31 DIAGNOSIS — B25.9 CYTOMEGALOVIRUS INFECTION, UNSPECIFIED CYTOMEGALOVIRAL INFECTION TYPE: ICD-10-CM

## 2021-03-31 DIAGNOSIS — N25.81 SECONDARY HYPERPARATHYROIDISM OF RENAL ORIGIN: ICD-10-CM

## 2021-03-31 DIAGNOSIS — N28.9 KIDNEY DISEASE: ICD-10-CM

## 2021-03-31 DIAGNOSIS — Z94.0 KIDNEY REPLACED BY TRANSPLANT: ICD-10-CM

## 2021-03-31 DIAGNOSIS — I10 ESSENTIAL HYPERTENSION: ICD-10-CM

## 2021-03-31 DIAGNOSIS — Z51.81 ENCOUNTER FOR THERAPEUTIC DRUG MONITORING: Primary | ICD-10-CM

## 2021-03-31 PROCEDURE — 3077F PR MOST RECENT SYSTOLIC BLOOD PRESSURE >= 140 MM HG: ICD-10-PCS | Mod: CPTII,S$GLB,, | Performed by: SURGERY

## 2021-03-31 PROCEDURE — 1101F PR PT FALLS ASSESS DOC 0-1 FALLS W/OUT INJ PAST YR: ICD-10-PCS | Mod: CPTII,S$GLB,, | Performed by: SURGERY

## 2021-03-31 PROCEDURE — 1159F PR MEDICATION LIST DOCUMENTED IN MEDICAL RECORD: ICD-10-PCS | Mod: S$GLB,,, | Performed by: SURGERY

## 2021-03-31 PROCEDURE — 3288F FALL RISK ASSESSMENT DOCD: CPT | Mod: CPTII,S$GLB,, | Performed by: SURGERY

## 2021-03-31 PROCEDURE — 3288F PR FALLS RISK ASSESSMENT DOCUMENTED: ICD-10-PCS | Mod: CPTII,S$GLB,, | Performed by: SURGERY

## 2021-03-31 PROCEDURE — 3077F SYST BP >= 140 MM HG: CPT | Mod: CPTII,S$GLB,, | Performed by: SURGERY

## 2021-03-31 PROCEDURE — 3008F PR BODY MASS INDEX (BMI) DOCUMENTED: ICD-10-PCS | Mod: CPTII,S$GLB,, | Performed by: SURGERY

## 2021-03-31 PROCEDURE — 3008F BODY MASS INDEX DOCD: CPT | Mod: CPTII,S$GLB,, | Performed by: SURGERY

## 2021-03-31 PROCEDURE — 1101F PT FALLS ASSESS-DOCD LE1/YR: CPT | Mod: CPTII,S$GLB,, | Performed by: SURGERY

## 2021-03-31 PROCEDURE — 1126F PR PAIN SEVERITY QUANTIFIED, NO PAIN PRESENT: ICD-10-PCS | Mod: S$GLB,,, | Performed by: SURGERY

## 2021-03-31 PROCEDURE — 3078F DIAST BP <80 MM HG: CPT | Mod: CPTII,S$GLB,, | Performed by: SURGERY

## 2021-03-31 PROCEDURE — 99213 OFFICE O/P EST LOW 20 MIN: CPT | Mod: 24,S$GLB,, | Performed by: SURGERY

## 2021-03-31 PROCEDURE — 1126F AMNT PAIN NOTED NONE PRSNT: CPT | Mod: S$GLB,,, | Performed by: SURGERY

## 2021-03-31 PROCEDURE — 99213 PR OFFICE/OUTPT VISIT, EST, LEVL III, 20-29 MIN: ICD-10-PCS | Mod: 24,S$GLB,, | Performed by: SURGERY

## 2021-03-31 PROCEDURE — 3078F PR MOST RECENT DIASTOLIC BLOOD PRESSURE < 80 MM HG: ICD-10-PCS | Mod: CPTII,S$GLB,, | Performed by: SURGERY

## 2021-03-31 PROCEDURE — 99999 PR PBB SHADOW E&M-EST. PATIENT-LVL IV: CPT | Mod: PBBFAC,,,

## 2021-03-31 PROCEDURE — 99999 PR PBB SHADOW E&M-EST. PATIENT-LVL IV: ICD-10-PCS | Mod: PBBFAC,,,

## 2021-03-31 PROCEDURE — 1159F MED LIST DOCD IN RCRD: CPT | Mod: S$GLB,,, | Performed by: SURGERY

## 2021-03-31 SDOH — SOCIAL DETERMINANTS OF HEALTH (SDOH): OCCUPATIONAL EXPOSURE TO OTHER RISK FACTORS: Z57.8

## 2021-04-01 ENCOUNTER — PATIENT MESSAGE (OUTPATIENT)
Dept: TRANSPLANT | Facility: CLINIC | Age: 71
End: 2021-04-01

## 2021-04-01 ENCOUNTER — TELEPHONE (OUTPATIENT)
Dept: TRANSPLANT | Facility: CLINIC | Age: 71
End: 2021-04-01

## 2021-04-01 ENCOUNTER — LAB VISIT (OUTPATIENT)
Dept: LAB | Facility: HOSPITAL | Age: 71
End: 2021-04-01
Payer: MEDICARE

## 2021-04-01 DIAGNOSIS — Z94.0 KIDNEY REPLACED BY TRANSPLANT: ICD-10-CM

## 2021-04-01 DIAGNOSIS — Z94.0 KIDNEY REPLACED BY TRANSPLANT: Primary | ICD-10-CM

## 2021-04-01 DIAGNOSIS — Z79.899 IMMUNOSUPPRESSIVE MANAGEMENT ENCOUNTER FOLLOWING KIDNEY TRANSPLANT: ICD-10-CM

## 2021-04-01 DIAGNOSIS — E11.9 TYPE 2 DIABETES MELLITUS WITHOUT COMPLICATION, UNSPECIFIED WHETHER LONG TERM INSULIN USE: Primary | ICD-10-CM

## 2021-04-01 DIAGNOSIS — Z94.0 IMMUNOSUPPRESSIVE MANAGEMENT ENCOUNTER FOLLOWING KIDNEY TRANSPLANT: ICD-10-CM

## 2021-04-01 DIAGNOSIS — Z91.89 AT RISK FOR INFECTION TRANSMITTED FROM DONOR: ICD-10-CM

## 2021-04-01 LAB
ALBUMIN SERPL BCP-MCNC: 3 G/DL (ref 3.5–5.2)
ALBUMIN SERPL BCP-MCNC: 3 G/DL (ref 3.5–5.2)
ALP SERPL-CCNC: 59 U/L (ref 55–135)
ALT SERPL W/O P-5'-P-CCNC: 97 U/L (ref 10–44)
ANION GAP SERPL CALC-SCNC: 12 MMOL/L (ref 8–16)
AST SERPL-CCNC: 71 U/L (ref 10–40)
BACTERIA SPEC ANAEROBE CULT: NORMAL
BASOPHILS # BLD AUTO: 0.06 K/UL (ref 0–0.2)
BASOPHILS NFR BLD: 0.3 % (ref 0–1.9)
BILIRUB DIRECT SERPL-MCNC: 0.2 MG/DL (ref 0.1–0.3)
BILIRUB SERPL-MCNC: 0.3 MG/DL (ref 0.1–1)
BUN SERPL-MCNC: 25 MG/DL (ref 8–23)
CALCIUM SERPL-MCNC: 8.9 MG/DL (ref 8.7–10.5)
CHLORIDE SERPL-SCNC: 103 MMOL/L (ref 95–110)
CO2 SERPL-SCNC: 25 MMOL/L (ref 23–29)
CREAT SERPL-MCNC: 1.5 MG/DL (ref 0.5–1.4)
DIFFERENTIAL METHOD: ABNORMAL
EOSINOPHIL # BLD AUTO: 0.1 K/UL (ref 0–0.5)
EOSINOPHIL NFR BLD: 0.5 % (ref 0–8)
ERYTHROCYTE [DISTWIDTH] IN BLOOD BY AUTOMATED COUNT: 15.4 % (ref 11.5–14.5)
EST. GFR  (AFRICAN AMERICAN): 39.8 ML/MIN/1.73 M^2
EST. GFR  (NON AFRICAN AMERICAN): 34.6 ML/MIN/1.73 M^2
FERRITIN SERPL-MCNC: 3552 NG/ML (ref 20–300)
GLUCOSE SERPL-MCNC: 226 MG/DL (ref 70–110)
HCT VFR BLD AUTO: 29 % (ref 37–48.5)
HCV AB SERPL QL IA: NEGATIVE
HGB BLD-MCNC: 8.8 G/DL (ref 12–16)
IMM GRANULOCYTES # BLD AUTO: 0.21 K/UL (ref 0–0.04)
IMM GRANULOCYTES NFR BLD AUTO: 1.1 % (ref 0–0.5)
IRON SERPL-MCNC: 37 UG/DL (ref 30–160)
LYMPHOCYTES # BLD AUTO: 1.7 K/UL (ref 1–4.8)
LYMPHOCYTES NFR BLD: 9.3 % (ref 18–48)
MAGNESIUM SERPL-MCNC: 1.7 MG/DL (ref 1.6–2.6)
MCH RBC QN AUTO: 29.2 PG (ref 27–31)
MCHC RBC AUTO-ENTMCNC: 30.3 G/DL (ref 32–36)
MCV RBC AUTO: 96 FL (ref 82–98)
MONOCYTES # BLD AUTO: 1.8 K/UL (ref 0.3–1)
MONOCYTES NFR BLD: 9.9 % (ref 4–15)
NEUTROPHILS # BLD AUTO: 14.6 K/UL (ref 1.8–7.7)
NEUTROPHILS NFR BLD: 78.9 % (ref 38–73)
NRBC BLD-RTO: 0 /100 WBC
PHOSPHATE SERPL-MCNC: 2.8 MG/DL (ref 2.7–4.5)
PLATELET # BLD AUTO: 314 K/UL (ref 150–450)
PMV BLD AUTO: 9.9 FL (ref 9.2–12.9)
POTASSIUM SERPL-SCNC: 3.3 MMOL/L (ref 3.5–5.1)
PROT SERPL-MCNC: 6.4 G/DL (ref 6–8.4)
RBC # BLD AUTO: 3.01 M/UL (ref 4–5.4)
SATURATED IRON: 16 % (ref 20–50)
SODIUM SERPL-SCNC: 140 MMOL/L (ref 136–145)
TACROLIMUS BLD-MCNC: 8.4 NG/ML (ref 5–15)
TOTAL IRON BINDING CAPACITY: 225 UG/DL (ref 250–450)
TRANSFERRIN SERPL-MCNC: 152 MG/DL (ref 200–375)
WBC # BLD AUTO: 18.5 K/UL (ref 3.9–12.7)

## 2021-04-01 PROCEDURE — 87522 HEPATITIS C REVRS TRNSCRPJ: CPT | Performed by: INTERNAL MEDICINE

## 2021-04-01 PROCEDURE — 84075 ASSAY ALKALINE PHOSPHATASE: CPT | Performed by: INTERNAL MEDICINE

## 2021-04-01 PROCEDURE — 83735 ASSAY OF MAGNESIUM: CPT | Performed by: INTERNAL MEDICINE

## 2021-04-01 PROCEDURE — 36415 COLL VENOUS BLD VENIPUNCTURE: CPT | Performed by: INTERNAL MEDICINE

## 2021-04-01 PROCEDURE — 83540 ASSAY OF IRON: CPT | Performed by: INTERNAL MEDICINE

## 2021-04-01 PROCEDURE — 86803 HEPATITIS C AB TEST: CPT | Performed by: INTERNAL MEDICINE

## 2021-04-01 PROCEDURE — 84450 TRANSFERASE (AST) (SGOT): CPT | Performed by: INTERNAL MEDICINE

## 2021-04-01 PROCEDURE — 85025 COMPLETE CBC W/AUTO DIFF WBC: CPT | Performed by: INTERNAL MEDICINE

## 2021-04-01 PROCEDURE — 82728 ASSAY OF FERRITIN: CPT | Performed by: INTERNAL MEDICINE

## 2021-04-01 PROCEDURE — 80197 ASSAY OF TACROLIMUS: CPT | Performed by: INTERNAL MEDICINE

## 2021-04-01 PROCEDURE — 80069 RENAL FUNCTION PANEL: CPT | Performed by: INTERNAL MEDICINE

## 2021-04-01 RX ORDER — NIFEDIPINE 30 MG/1
60 TABLET, EXTENDED RELEASE ORAL DAILY
Qty: 30 TABLET | Refills: 6 | Status: SHIPPED | OUTPATIENT
Start: 2021-04-01

## 2021-04-01 RX ORDER — SULFAMETHOXAZOLE AND TRIMETHOPRIM 400; 80 MG/1; MG/1
1 TABLET ORAL EVERY MORNING
Qty: 30 TABLET | Refills: 4 | Status: SHIPPED | OUTPATIENT
Start: 2021-04-01 | End: 2022-01-11

## 2021-04-01 RX ORDER — PREDNISONE 5 MG/1
TABLET ORAL
Qty: 120 TABLET | Refills: 11 | Status: SHIPPED | OUTPATIENT
Start: 2021-04-01 | End: 2022-04-25 | Stop reason: SDUPTHER

## 2021-04-01 RX ORDER — INSULIN ASPART 100 [IU]/ML
12 INJECTION, SOLUTION INTRAVENOUS; SUBCUTANEOUS 3 TIMES DAILY
Qty: 15 ML | Refills: 1 | Status: SHIPPED | OUTPATIENT
Start: 2021-04-01 | End: 2021-04-20 | Stop reason: SDUPTHER

## 2021-04-01 RX ORDER — TACROLIMUS 1 MG/1
5 CAPSULE ORAL EVERY 12 HOURS
Qty: 300 CAPSULE | Refills: 11 | Status: SHIPPED | OUTPATIENT
Start: 2021-04-01 | End: 2021-04-15 | Stop reason: DRUGHIGH

## 2021-04-01 RX ORDER — ROSUVASTATIN CALCIUM 20 MG/1
20 TABLET, COATED ORAL DAILY
Qty: 30 TABLET | Refills: 5 | Status: SHIPPED | OUTPATIENT
Start: 2021-04-01

## 2021-04-01 RX ORDER — LEVETIRACETAM 250 MG/1
500 TABLET ORAL 2 TIMES DAILY
Qty: 120 TABLET | Refills: 5 | Status: SHIPPED | OUTPATIENT
Start: 2021-04-01 | End: 2021-10-11

## 2021-04-01 RX ORDER — MYCOPHENOLATE MOFETIL 250 MG/1
1000 CAPSULE ORAL 2 TIMES DAILY
Qty: 240 CAPSULE | Refills: 11 | Status: SHIPPED | OUTPATIENT
Start: 2021-04-01 | End: 2022-04-25 | Stop reason: SDUPTHER

## 2021-04-01 RX ORDER — PANTOPRAZOLE SODIUM 40 MG/1
40 TABLET, DELAYED RELEASE ORAL
Qty: 60 TABLET | Refills: 11 | Status: SHIPPED | OUTPATIENT
Start: 2021-04-01 | End: 2021-04-12

## 2021-04-01 RX ORDER — LEVOTHYROXINE SODIUM 100 UG/1
100 TABLET ORAL DAILY
Qty: 30 TABLET | Refills: 5 | Status: SHIPPED | OUTPATIENT
Start: 2021-04-01

## 2021-04-01 RX ORDER — ASPIRIN 81 MG/1
81 TABLET ORAL DAILY
Qty: 30 TABLET | Refills: 11 | Status: SHIPPED | OUTPATIENT
Start: 2021-04-01

## 2021-04-01 RX ORDER — MONTELUKAST SODIUM 10 MG/1
10 TABLET ORAL DAILY
Qty: 30 TABLET | Refills: 5 | Status: SHIPPED | OUTPATIENT
Start: 2021-04-01

## 2021-04-01 RX ORDER — VALGANCICLOVIR 450 MG/1
450 TABLET, FILM COATED ORAL EVERY MORNING
Qty: 30 TABLET | Refills: 1 | Status: SHIPPED | OUTPATIENT
Start: 2021-04-01 | End: 2021-06-04 | Stop reason: SDUPTHER

## 2021-04-02 ENCOUNTER — PATIENT MESSAGE (OUTPATIENT)
Dept: ADMINISTRATIVE | Facility: OTHER | Age: 71
End: 2021-04-02

## 2021-04-03 ENCOUNTER — PATIENT MESSAGE (OUTPATIENT)
Dept: TRANSPLANT | Facility: CLINIC | Age: 71
End: 2021-04-03

## 2021-04-03 ENCOUNTER — TELEPHONE (OUTPATIENT)
Dept: TRANSPLANT | Facility: CLINIC | Age: 71
End: 2021-04-03

## 2021-04-03 ENCOUNTER — NURSE TRIAGE (OUTPATIENT)
Dept: ADMINISTRATIVE | Facility: CLINIC | Age: 71
End: 2021-04-03

## 2021-04-03 ENCOUNTER — HOSPITAL ENCOUNTER (EMERGENCY)
Facility: HOSPITAL | Age: 71
Discharge: HOME | End: 2021-04-03
Attending: EMERGENCY MEDICINE
Payer: MEDICARE

## 2021-04-03 VITALS
DIASTOLIC BLOOD PRESSURE: 60 MMHG | RESPIRATION RATE: 18 BRPM | BODY MASS INDEX: 28.87 KG/M2 | OXYGEN SATURATION: 98 % | TEMPERATURE: 98 F | HEART RATE: 95 BPM | WEIGHT: 162.94 LBS | SYSTOLIC BLOOD PRESSURE: 128 MMHG | HEIGHT: 63 IN

## 2021-04-03 DIAGNOSIS — E87.6 HYPOKALEMIA: Primary | ICD-10-CM

## 2021-04-03 DIAGNOSIS — Z94.0 KIDNEY REPLACED BY TRANSPLANT: Primary | ICD-10-CM

## 2021-04-03 DIAGNOSIS — E87.6 HYPOKALEMIA: ICD-10-CM

## 2021-04-03 PROCEDURE — 96365 THER/PROPH/DIAG IV INF INIT: CPT

## 2021-04-03 PROCEDURE — 96366 THER/PROPH/DIAG IV INF ADDON: CPT

## 2021-04-03 PROCEDURE — 99285 PR EMERGENCY DEPT VISIT,LEVEL V: ICD-10-PCS | Mod: ,,, | Performed by: EMERGENCY MEDICINE

## 2021-04-03 PROCEDURE — 25000003 PHARM REV CODE 250: Performed by: EMERGENCY MEDICINE

## 2021-04-03 PROCEDURE — 99285 EMERGENCY DEPT VISIT HI MDM: CPT | Mod: ,,, | Performed by: EMERGENCY MEDICINE

## 2021-04-03 PROCEDURE — 99284 EMERGENCY DEPT VISIT MOD MDM: CPT | Mod: 25

## 2021-04-03 PROCEDURE — 63600175 PHARM REV CODE 636 W HCPCS: Performed by: EMERGENCY MEDICINE

## 2021-04-03 RX ORDER — POTASSIUM CHLORIDE 7.45 MG/ML
20 INJECTION INTRAVENOUS
Status: COMPLETED | OUTPATIENT
Start: 2021-04-03 | End: 2021-04-03

## 2021-04-03 RX ORDER — POTASSIUM CHLORIDE 20 MEQ/1
40 TABLET, EXTENDED RELEASE ORAL ONCE
Status: COMPLETED | OUTPATIENT
Start: 2021-04-03 | End: 2021-04-03

## 2021-04-03 RX ORDER — POTASSIUM CHLORIDE 7.45 MG/ML
10 INJECTION INTRAVENOUS
Status: DISCONTINUED | OUTPATIENT
Start: 2021-04-03 | End: 2021-04-03

## 2021-04-03 RX ORDER — POTASSIUM CHLORIDE 20 MEQ/1
40 TABLET, EXTENDED RELEASE ORAL DAILY
Qty: 8 TABLET | Refills: 0 | Status: SHIPPED | OUTPATIENT
Start: 2021-04-03 | End: 2021-04-05 | Stop reason: SDUPTHER

## 2021-04-03 RX ADMIN — POTASSIUM CHLORIDE 40 MEQ: 1500 TABLET, EXTENDED RELEASE ORAL at 03:04

## 2021-04-03 RX ADMIN — POTASSIUM CHLORIDE 20 MEQ: 7.46 INJECTION, SOLUTION INTRAVENOUS at 04:04

## 2021-04-04 ENCOUNTER — TELEPHONE (OUTPATIENT)
Dept: TRANSPLANT | Facility: CLINIC | Age: 71
End: 2021-04-04

## 2021-04-04 ENCOUNTER — LAB VISIT (OUTPATIENT)
Dept: LAB | Facility: HOSPITAL | Age: 71
End: 2021-04-04
Attending: INTERNAL MEDICINE
Payer: MEDICARE

## 2021-04-04 DIAGNOSIS — Z94.0 KIDNEY REPLACED BY TRANSPLANT: ICD-10-CM

## 2021-04-04 LAB
ALBUMIN SERPL BCP-MCNC: 3 G/DL (ref 3.5–5.2)
ANION GAP SERPL CALC-SCNC: 13 MMOL/L (ref 8–16)
BASOPHILS # BLD AUTO: 0.03 K/UL (ref 0–0.2)
BASOPHILS NFR BLD: 0.2 % (ref 0–1.9)
BUN SERPL-MCNC: 21 MG/DL (ref 8–23)
CALCIUM SERPL-MCNC: 8.7 MG/DL (ref 8.7–10.5)
CHLORIDE SERPL-SCNC: 102 MMOL/L (ref 95–110)
CO2 SERPL-SCNC: 25 MMOL/L (ref 23–29)
CREAT SERPL-MCNC: 1.3 MG/DL (ref 0.5–1.4)
DIFFERENTIAL METHOD: ABNORMAL
EOSINOPHIL # BLD AUTO: 0.1 K/UL (ref 0–0.5)
EOSINOPHIL NFR BLD: 0.4 % (ref 0–8)
ERYTHROCYTE [DISTWIDTH] IN BLOOD BY AUTOMATED COUNT: 15 % (ref 11.5–14.5)
EST. GFR  (AFRICAN AMERICAN): 47.4 ML/MIN/1.73 M^2
EST. GFR  (NON AFRICAN AMERICAN): 41.1 ML/MIN/1.73 M^2
GLUCOSE SERPL-MCNC: 91 MG/DL (ref 70–110)
HCT VFR BLD AUTO: 29 % (ref 37–48.5)
HGB BLD-MCNC: 9 G/DL (ref 12–16)
IMM GRANULOCYTES # BLD AUTO: 0.12 K/UL (ref 0–0.04)
IMM GRANULOCYTES NFR BLD AUTO: 0.7 % (ref 0–0.5)
LYMPHOCYTES # BLD AUTO: 2.1 K/UL (ref 1–4.8)
LYMPHOCYTES NFR BLD: 12.8 % (ref 18–48)
MAGNESIUM SERPL-MCNC: 1.5 MG/DL (ref 1.6–2.6)
MCH RBC QN AUTO: 29.4 PG (ref 27–31)
MCHC RBC AUTO-ENTMCNC: 31 G/DL (ref 32–36)
MCV RBC AUTO: 95 FL (ref 82–98)
MONOCYTES # BLD AUTO: 1.7 K/UL (ref 0.3–1)
MONOCYTES NFR BLD: 10 % (ref 4–15)
NEUTROPHILS # BLD AUTO: 12.5 K/UL (ref 1.8–7.7)
NEUTROPHILS NFR BLD: 75.9 % (ref 38–73)
NRBC BLD-RTO: 0 /100 WBC
PHOSPHATE SERPL-MCNC: 2.9 MG/DL (ref 2.7–4.5)
PLATELET # BLD AUTO: 358 K/UL (ref 150–450)
PMV BLD AUTO: 9.6 FL (ref 9.2–12.9)
POTASSIUM SERPL-SCNC: 3.3 MMOL/L (ref 3.5–5.1)
RBC # BLD AUTO: 3.06 M/UL (ref 4–5.4)
SODIUM SERPL-SCNC: 140 MMOL/L (ref 136–145)
TACROLIMUS BLD-MCNC: 9.9 NG/ML (ref 5–15)
WBC # BLD AUTO: 16.44 K/UL (ref 3.9–12.7)

## 2021-04-04 PROCEDURE — 36415 COLL VENOUS BLD VENIPUNCTURE: CPT | Performed by: INTERNAL MEDICINE

## 2021-04-04 PROCEDURE — 85025 COMPLETE CBC W/AUTO DIFF WBC: CPT | Performed by: INTERNAL MEDICINE

## 2021-04-04 PROCEDURE — 80069 RENAL FUNCTION PANEL: CPT | Performed by: INTERNAL MEDICINE

## 2021-04-04 PROCEDURE — 80197 ASSAY OF TACROLIMUS: CPT | Performed by: INTERNAL MEDICINE

## 2021-04-04 PROCEDURE — 83735 ASSAY OF MAGNESIUM: CPT | Performed by: INTERNAL MEDICINE

## 2021-04-05 ENCOUNTER — LAB VISIT (OUTPATIENT)
Dept: LAB | Facility: HOSPITAL | Age: 71
End: 2021-04-05
Attending: NURSE PRACTITIONER
Payer: MEDICARE

## 2021-04-05 DIAGNOSIS — Z94.0 KIDNEY REPLACED BY TRANSPLANT: ICD-10-CM

## 2021-04-05 DIAGNOSIS — E87.6 HYPOKALEMIA: ICD-10-CM

## 2021-04-05 DIAGNOSIS — E83.42 HYPOMAGNESEMIA: Primary | ICD-10-CM

## 2021-04-05 LAB
ALBUMIN SERPL BCP-MCNC: 2.7 G/DL (ref 3.5–5.2)
ANION GAP SERPL CALC-SCNC: 12 MMOL/L (ref 8–16)
BASOPHILS # BLD AUTO: 0.05 K/UL (ref 0–0.2)
BASOPHILS NFR BLD: 0.3 % (ref 0–1.9)
BUN SERPL-MCNC: 18 MG/DL (ref 8–23)
CALCIUM SERPL-MCNC: 8.3 MG/DL (ref 8.7–10.5)
CHLORIDE SERPL-SCNC: 106 MMOL/L (ref 95–110)
CO2 SERPL-SCNC: 25 MMOL/L (ref 23–29)
CREAT SERPL-MCNC: 1.2 MG/DL (ref 0.5–1.4)
DIFFERENTIAL METHOD: ABNORMAL
EOSINOPHIL # BLD AUTO: 0.1 K/UL (ref 0–0.5)
EOSINOPHIL NFR BLD: 0.5 % (ref 0–8)
ERYTHROCYTE [DISTWIDTH] IN BLOOD BY AUTOMATED COUNT: 15.1 % (ref 11.5–14.5)
EST. GFR  (AFRICAN AMERICAN): 52.2 ML/MIN/1.73 M^2
EST. GFR  (NON AFRICAN AMERICAN): 45.3 ML/MIN/1.73 M^2
GLUCOSE SERPL-MCNC: 113 MG/DL (ref 70–110)
HCT VFR BLD AUTO: 27.2 % (ref 37–48.5)
HCV RNA SERPL NAA+PROBE-LOG IU: <1.08 LOG (10) IU/ML
HCV RNA SERPL QL NAA+PROBE: NOT DETECTED IU/ML
HCV RNA SPEC NAA+PROBE-ACNC: <12 IU/ML
HGB BLD-MCNC: 8.4 G/DL (ref 12–16)
IMM GRANULOCYTES # BLD AUTO: 0.08 K/UL (ref 0–0.04)
IMM GRANULOCYTES NFR BLD AUTO: 0.5 % (ref 0–0.5)
LYMPHOCYTES # BLD AUTO: 2.4 K/UL (ref 1–4.8)
LYMPHOCYTES NFR BLD: 16.3 % (ref 18–48)
MAGNESIUM SERPL-MCNC: 1.4 MG/DL (ref 1.6–2.6)
MCH RBC QN AUTO: 29.4 PG (ref 27–31)
MCHC RBC AUTO-ENTMCNC: 30.9 G/DL (ref 32–36)
MCV RBC AUTO: 95 FL (ref 82–98)
MONOCYTES # BLD AUTO: 1.3 K/UL (ref 0.3–1)
MONOCYTES NFR BLD: 9 % (ref 4–15)
NEUTROPHILS # BLD AUTO: 10.8 K/UL (ref 1.8–7.7)
NEUTROPHILS NFR BLD: 73.4 % (ref 38–73)
NRBC BLD-RTO: 0 /100 WBC
PHOSPHATE SERPL-MCNC: 2.7 MG/DL (ref 2.7–4.5)
PLATELET # BLD AUTO: 309 K/UL (ref 150–450)
PMV BLD AUTO: 9.2 FL (ref 9.2–12.9)
POTASSIUM SERPL-SCNC: 3.2 MMOL/L (ref 3.5–5.1)
RBC # BLD AUTO: 2.86 M/UL (ref 4–5.4)
SODIUM SERPL-SCNC: 143 MMOL/L (ref 136–145)
TACROLIMUS BLD-MCNC: 7.5 NG/ML (ref 5–15)
WBC # BLD AUTO: 14.68 K/UL (ref 3.9–12.7)

## 2021-04-05 PROCEDURE — 36415 COLL VENOUS BLD VENIPUNCTURE: CPT | Performed by: INTERNAL MEDICINE

## 2021-04-05 PROCEDURE — 83735 ASSAY OF MAGNESIUM: CPT | Performed by: INTERNAL MEDICINE

## 2021-04-05 PROCEDURE — 80069 RENAL FUNCTION PANEL: CPT | Performed by: INTERNAL MEDICINE

## 2021-04-05 PROCEDURE — 80197 ASSAY OF TACROLIMUS: CPT | Performed by: INTERNAL MEDICINE

## 2021-04-05 PROCEDURE — 85025 COMPLETE CBC W/AUTO DIFF WBC: CPT | Performed by: INTERNAL MEDICINE

## 2021-04-05 RX ORDER — LANOLIN ALCOHOL/MO/W.PET/CERES
800 CREAM (GRAM) TOPICAL 2 TIMES DAILY
Qty: 120 TABLET | Refills: 11 | Status: SHIPPED | OUTPATIENT
Start: 2021-04-05 | End: 2021-04-07

## 2021-04-05 RX ORDER — POTASSIUM CHLORIDE 20 MEQ/1
40 TABLET, EXTENDED RELEASE ORAL DAILY
Qty: 10 TABLET | Refills: 0 | Status: SHIPPED | OUTPATIENT
Start: 2021-04-05 | End: 2021-04-09 | Stop reason: ALTCHOICE

## 2021-04-06 DIAGNOSIS — T38.0X5A STEROID-INDUCED HYPERLIPIDEMIA: ICD-10-CM

## 2021-04-06 DIAGNOSIS — E78.49 STEROID-INDUCED HYPERLIPIDEMIA: ICD-10-CM

## 2021-04-06 DIAGNOSIS — Z94.0 IMMUNOSUPPRESSIVE MANAGEMENT ENCOUNTER FOLLOWING KIDNEY TRANSPLANT: ICD-10-CM

## 2021-04-06 DIAGNOSIS — B25.9 CYTOMEGALOVIRUS INFECTION, UNSPECIFIED CYTOMEGALOVIRAL INFECTION TYPE: ICD-10-CM

## 2021-04-06 DIAGNOSIS — Z94.0 KIDNEY REPLACED BY TRANSPLANT: ICD-10-CM

## 2021-04-06 DIAGNOSIS — Z57.8 OCCUPATIONAL EXPOSURE TO OTHER RISK FACTORS: Primary | ICD-10-CM

## 2021-04-06 DIAGNOSIS — N25.81 SECONDARY HYPERPARATHYROIDISM OF RENAL ORIGIN: ICD-10-CM

## 2021-04-06 DIAGNOSIS — T86.10 COMPLICATION OF TRANSPLANTED KIDNEY, UNSPECIFIED COMPLICATION: ICD-10-CM

## 2021-04-06 DIAGNOSIS — N28.9 KIDNEY DISEASE: ICD-10-CM

## 2021-04-06 DIAGNOSIS — T86.19 RECEIVED KIDNEY FROM DONOR WITH HEPATITIS C: ICD-10-CM

## 2021-04-06 DIAGNOSIS — Z79.899 IMMUNOSUPPRESSIVE MANAGEMENT ENCOUNTER FOLLOWING KIDNEY TRANSPLANT: ICD-10-CM

## 2021-04-06 DIAGNOSIS — R80.9 PROTEINURIA, UNSPECIFIED TYPE: ICD-10-CM

## 2021-04-06 SDOH — SOCIAL DETERMINANTS OF HEALTH (SDOH): OCCUPATIONAL EXPOSURE TO OTHER RISK FACTORS: Z57.8

## 2021-04-07 ENCOUNTER — OFFICE VISIT (OUTPATIENT)
Dept: TRANSPLANT | Facility: CLINIC | Age: 71
End: 2021-04-07
Payer: MEDICARE

## 2021-04-07 ENCOUNTER — LAB VISIT (OUTPATIENT)
Dept: LAB | Facility: HOSPITAL | Age: 71
End: 2021-04-07
Attending: INTERNAL MEDICINE
Payer: MEDICARE

## 2021-04-07 ENCOUNTER — PATIENT MESSAGE (OUTPATIENT)
Dept: TRANSPLANT | Facility: CLINIC | Age: 71
End: 2021-04-07

## 2021-04-07 ENCOUNTER — CLINICAL SUPPORT (OUTPATIENT)
Dept: TRANSPLANT | Facility: CLINIC | Age: 71
End: 2021-04-07
Payer: MEDICARE

## 2021-04-07 VITALS
OXYGEN SATURATION: 98 % | BODY MASS INDEX: 28.24 KG/M2 | RESPIRATION RATE: 16 BRPM | SYSTOLIC BLOOD PRESSURE: 152 MMHG | HEIGHT: 63 IN | WEIGHT: 159.38 LBS | HEART RATE: 93 BPM | TEMPERATURE: 98 F | DIASTOLIC BLOOD PRESSURE: 66 MMHG

## 2021-04-07 VITALS
SYSTOLIC BLOOD PRESSURE: 149 MMHG | BODY MASS INDEX: 28.29 KG/M2 | DIASTOLIC BLOOD PRESSURE: 70 MMHG | HEIGHT: 63 IN | WEIGHT: 159.63 LBS

## 2021-04-07 DIAGNOSIS — Z94.0 KIDNEY REPLACED BY TRANSPLANT: ICD-10-CM

## 2021-04-07 DIAGNOSIS — D63.8 ANEMIA OF CHRONIC DISEASE: Primary | ICD-10-CM

## 2021-04-07 DIAGNOSIS — T86.19 RECEIVED KIDNEY FROM DONOR WITH HEPATITIS C: ICD-10-CM

## 2021-04-07 DIAGNOSIS — Z79.60 LONG-TERM USE OF IMMUNOSUPPRESSANT MEDICATION: ICD-10-CM

## 2021-04-07 DIAGNOSIS — Z94.0 IMMUNOSUPPRESSIVE MANAGEMENT ENCOUNTER FOLLOWING KIDNEY TRANSPLANT: ICD-10-CM

## 2021-04-07 DIAGNOSIS — E83.42 HYPOMAGNESEMIA: ICD-10-CM

## 2021-04-07 DIAGNOSIS — Z79.899 IMMUNOSUPPRESSIVE MANAGEMENT ENCOUNTER FOLLOWING KIDNEY TRANSPLANT: ICD-10-CM

## 2021-04-07 DIAGNOSIS — Z51.81 ENCOUNTER FOR THERAPEUTIC DRUG MONITORING: ICD-10-CM

## 2021-04-07 DIAGNOSIS — Z94.0 S/P KIDNEY TRANSPLANT: Primary | ICD-10-CM

## 2021-04-07 DIAGNOSIS — Z57.8 OCCUPATIONAL EXPOSURE TO OTHER RISK FACTORS: ICD-10-CM

## 2021-04-07 LAB
ALBUMIN SERPL BCP-MCNC: 2.9 G/DL (ref 3.5–5.2)
ALBUMIN SERPL BCP-MCNC: 2.9 G/DL (ref 3.5–5.2)
ALP SERPL-CCNC: 52 U/L (ref 55–135)
ALT SERPL W/O P-5'-P-CCNC: 46 U/L (ref 10–44)
ANION GAP SERPL CALC-SCNC: 10 MMOL/L (ref 8–16)
AST SERPL-CCNC: 31 U/L (ref 10–40)
BASOPHILS # BLD AUTO: 0.07 K/UL (ref 0–0.2)
BASOPHILS NFR BLD: 0.4 % (ref 0–1.9)
BILIRUB DIRECT SERPL-MCNC: 0.1 MG/DL (ref 0.1–0.3)
BILIRUB SERPL-MCNC: 0.2 MG/DL (ref 0.1–1)
BUN SERPL-MCNC: 21 MG/DL (ref 8–23)
CALCIUM SERPL-MCNC: 8.8 MG/DL (ref 8.7–10.5)
CHLORIDE SERPL-SCNC: 111 MMOL/L (ref 95–110)
CO2 SERPL-SCNC: 22 MMOL/L (ref 23–29)
CREAT SERPL-MCNC: 1.1 MG/DL (ref 0.5–1.4)
DIFFERENTIAL METHOD: ABNORMAL
EOSINOPHIL # BLD AUTO: 0.1 K/UL (ref 0–0.5)
EOSINOPHIL NFR BLD: 0.7 % (ref 0–8)
ERYTHROCYTE [DISTWIDTH] IN BLOOD BY AUTOMATED COUNT: 15.2 % (ref 11.5–14.5)
EST. GFR  (AFRICAN AMERICAN): 58 ML/MIN/1.73 M^2
EST. GFR  (NON AFRICAN AMERICAN): 50.3 ML/MIN/1.73 M^2
GLUCOSE SERPL-MCNC: 159 MG/DL (ref 70–110)
HCT VFR BLD AUTO: 27 % (ref 37–48.5)
HCV AB SERPL QL IA: NEGATIVE
HGB BLD-MCNC: 8.2 G/DL (ref 12–16)
IMM GRANULOCYTES # BLD AUTO: 0.09 K/UL (ref 0–0.04)
IMM GRANULOCYTES NFR BLD AUTO: 0.6 % (ref 0–0.5)
LYMPHOCYTES # BLD AUTO: 2.6 K/UL (ref 1–4.8)
LYMPHOCYTES NFR BLD: 16.6 % (ref 18–48)
MAGNESIUM SERPL-MCNC: 1.4 MG/DL (ref 1.6–2.6)
MCH RBC QN AUTO: 29.6 PG (ref 27–31)
MCHC RBC AUTO-ENTMCNC: 30.4 G/DL (ref 32–36)
MCV RBC AUTO: 98 FL (ref 82–98)
MONOCYTES # BLD AUTO: 1 K/UL (ref 0.3–1)
MONOCYTES NFR BLD: 6.4 % (ref 4–15)
NEUTROPHILS # BLD AUTO: 12 K/UL (ref 1.8–7.7)
NEUTROPHILS NFR BLD: 75.3 % (ref 38–73)
NRBC BLD-RTO: 0 /100 WBC
PHOSPHATE SERPL-MCNC: 2 MG/DL (ref 2.7–4.5)
PLATELET # BLD AUTO: 374 K/UL (ref 150–450)
PMV BLD AUTO: 9.2 FL (ref 9.2–12.9)
POTASSIUM SERPL-SCNC: 3.9 MMOL/L (ref 3.5–5.1)
PROT SERPL-MCNC: 6.1 G/DL (ref 6–8.4)
RBC # BLD AUTO: 2.77 M/UL (ref 4–5.4)
SODIUM SERPL-SCNC: 143 MMOL/L (ref 136–145)
TACROLIMUS BLD-MCNC: 9.9 NG/ML (ref 5–15)
WBC # BLD AUTO: 15.86 K/UL (ref 3.9–12.7)

## 2021-04-07 PROCEDURE — 99999 PR PBB SHADOW E&M-EST. PATIENT-LVL III: CPT | Mod: PBBFAC,,,

## 2021-04-07 PROCEDURE — 86803 HEPATITIS C AB TEST: CPT | Performed by: INTERNAL MEDICINE

## 2021-04-07 PROCEDURE — 1101F PR PT FALLS ASSESS DOC 0-1 FALLS W/OUT INJ PAST YR: ICD-10-PCS | Mod: CPTII,S$GLB,, | Performed by: TRANSPLANT SURGERY

## 2021-04-07 PROCEDURE — 3077F SYST BP >= 140 MM HG: CPT | Mod: CPTII,S$GLB,, | Performed by: TRANSPLANT SURGERY

## 2021-04-07 PROCEDURE — 80069 RENAL FUNCTION PANEL: CPT | Performed by: INTERNAL MEDICINE

## 2021-04-07 PROCEDURE — 1101F PT FALLS ASSESS-DOCD LE1/YR: CPT | Mod: CPTII,S$GLB,, | Performed by: TRANSPLANT SURGERY

## 2021-04-07 PROCEDURE — 3288F PR FALLS RISK ASSESSMENT DOCUMENTED: ICD-10-PCS | Mod: CPTII,S$GLB,, | Performed by: TRANSPLANT SURGERY

## 2021-04-07 PROCEDURE — 87522 HEPATITIS C REVRS TRNSCRPJ: CPT | Performed by: INTERNAL MEDICINE

## 2021-04-07 PROCEDURE — 80197 ASSAY OF TACROLIMUS: CPT | Performed by: INTERNAL MEDICINE

## 2021-04-07 PROCEDURE — 3078F PR MOST RECENT DIASTOLIC BLOOD PRESSURE < 80 MM HG: ICD-10-PCS | Mod: CPTII,S$GLB,, | Performed by: TRANSPLANT SURGERY

## 2021-04-07 PROCEDURE — 3078F DIAST BP <80 MM HG: CPT | Mod: CPTII,S$GLB,, | Performed by: TRANSPLANT SURGERY

## 2021-04-07 PROCEDURE — 83735 ASSAY OF MAGNESIUM: CPT | Performed by: INTERNAL MEDICINE

## 2021-04-07 PROCEDURE — 1159F PR MEDICATION LIST DOCUMENTED IN MEDICAL RECORD: ICD-10-PCS | Mod: S$GLB,,, | Performed by: TRANSPLANT SURGERY

## 2021-04-07 PROCEDURE — 96372 PR INJECTION,THERAP/PROPH/DIAG2ST, IM OR SUBCUT: ICD-10-PCS | Mod: S$GLB,,, | Performed by: INTERNAL MEDICINE

## 2021-04-07 PROCEDURE — 99215 OFFICE O/P EST HI 40 MIN: CPT | Mod: 24,S$GLB,, | Performed by: TRANSPLANT SURGERY

## 2021-04-07 PROCEDURE — 3008F BODY MASS INDEX DOCD: CPT | Mod: CPTII,S$GLB,, | Performed by: TRANSPLANT SURGERY

## 2021-04-07 PROCEDURE — 99999 PR PBB SHADOW E&M-EST. PATIENT-LVL III: ICD-10-PCS | Mod: PBBFAC,,,

## 2021-04-07 PROCEDURE — 99999 PR PBB SHADOW E&M-EST. PATIENT-LVL II: CPT | Mod: PBBFAC,,,

## 2021-04-07 PROCEDURE — 99999 PR PBB SHADOW E&M-EST. PATIENT-LVL II: ICD-10-PCS | Mod: PBBFAC,,,

## 2021-04-07 PROCEDURE — 3288F FALL RISK ASSESSMENT DOCD: CPT | Mod: CPTII,S$GLB,, | Performed by: TRANSPLANT SURGERY

## 2021-04-07 PROCEDURE — 1159F MED LIST DOCD IN RCRD: CPT | Mod: S$GLB,,, | Performed by: TRANSPLANT SURGERY

## 2021-04-07 PROCEDURE — 3077F PR MOST RECENT SYSTOLIC BLOOD PRESSURE >= 140 MM HG: ICD-10-PCS | Mod: CPTII,S$GLB,, | Performed by: TRANSPLANT SURGERY

## 2021-04-07 PROCEDURE — 1126F AMNT PAIN NOTED NONE PRSNT: CPT | Mod: S$GLB,,, | Performed by: TRANSPLANT SURGERY

## 2021-04-07 PROCEDURE — 99215 PR OFFICE/OUTPT VISIT, EST, LEVL V, 40-54 MIN: ICD-10-PCS | Mod: 24,S$GLB,, | Performed by: TRANSPLANT SURGERY

## 2021-04-07 PROCEDURE — 96372 THER/PROPH/DIAG INJ SC/IM: CPT | Mod: S$GLB,,, | Performed by: INTERNAL MEDICINE

## 2021-04-07 PROCEDURE — 85025 COMPLETE CBC W/AUTO DIFF WBC: CPT | Performed by: INTERNAL MEDICINE

## 2021-04-07 PROCEDURE — 80076 HEPATIC FUNCTION PANEL: CPT | Mod: 59 | Performed by: INTERNAL MEDICINE

## 2021-04-07 PROCEDURE — 3008F PR BODY MASS INDEX (BMI) DOCUMENTED: ICD-10-PCS | Mod: CPTII,S$GLB,, | Performed by: TRANSPLANT SURGERY

## 2021-04-07 PROCEDURE — 1126F PR PAIN SEVERITY QUANTIFIED, NO PAIN PRESENT: ICD-10-PCS | Mod: S$GLB,,, | Performed by: TRANSPLANT SURGERY

## 2021-04-07 PROCEDURE — 36415 COLL VENOUS BLD VENIPUNCTURE: CPT | Performed by: INTERNAL MEDICINE

## 2021-04-07 SDOH — SOCIAL DETERMINANTS OF HEALTH (SDOH): OCCUPATIONAL EXPOSURE TO OTHER RISK FACTORS: Z57.8

## 2021-04-08 ENCOUNTER — PATIENT MESSAGE (OUTPATIENT)
Dept: ADMINISTRATIVE | Facility: OTHER | Age: 71
End: 2021-04-08

## 2021-04-08 RX ORDER — LANOLIN ALCOHOL/MO/W.PET/CERES
800 CREAM (GRAM) TOPICAL 3 TIMES DAILY
Qty: 180 TABLET | Refills: 11 | Status: SHIPPED | OUTPATIENT
Start: 2021-04-08 | End: 2021-04-12 | Stop reason: DRUGHIGH

## 2021-04-09 ENCOUNTER — LAB VISIT (OUTPATIENT)
Dept: LAB | Facility: HOSPITAL | Age: 71
End: 2021-04-09
Attending: NURSE PRACTITIONER
Payer: MEDICARE

## 2021-04-09 DIAGNOSIS — E83.42 HYPOMAGNESEMIA: ICD-10-CM

## 2021-04-09 DIAGNOSIS — Z94.0 KIDNEY REPLACED BY TRANSPLANT: ICD-10-CM

## 2021-04-09 LAB
ALBUMIN SERPL BCP-MCNC: 3 G/DL (ref 3.5–5.2)
ANION GAP SERPL CALC-SCNC: 8 MMOL/L (ref 8–16)
BASOPHILS # BLD AUTO: 0.07 K/UL (ref 0–0.2)
BASOPHILS NFR BLD: 0.4 % (ref 0–1.9)
BUN SERPL-MCNC: 20 MG/DL (ref 8–23)
CALCIUM SERPL-MCNC: 9.2 MG/DL (ref 8.7–10.5)
CHLORIDE SERPL-SCNC: 117 MMOL/L (ref 95–110)
CO2 SERPL-SCNC: 23 MMOL/L (ref 23–29)
CREAT SERPL-MCNC: 1 MG/DL (ref 0.5–1.4)
DIFFERENTIAL METHOD: ABNORMAL
EOSINOPHIL # BLD AUTO: 0.1 K/UL (ref 0–0.5)
EOSINOPHIL NFR BLD: 0.8 % (ref 0–8)
ERYTHROCYTE [DISTWIDTH] IN BLOOD BY AUTOMATED COUNT: 15.6 % (ref 11.5–14.5)
EST. GFR  (AFRICAN AMERICAN): >60 ML/MIN/1.73 M^2
EST. GFR  (NON AFRICAN AMERICAN): 56.4 ML/MIN/1.73 M^2
GLUCOSE SERPL-MCNC: 154 MG/DL (ref 70–110)
HCT VFR BLD AUTO: 27.9 % (ref 37–48.5)
HCV RNA SERPL NAA+PROBE-LOG IU: <1.08 LOG (10) IU/ML
HCV RNA SERPL QL NAA+PROBE: NOT DETECTED IU/ML
HCV RNA SPEC NAA+PROBE-ACNC: <12 IU/ML
HGB BLD-MCNC: 8.3 G/DL (ref 12–16)
IMM GRANULOCYTES # BLD AUTO: 0.09 K/UL (ref 0–0.04)
IMM GRANULOCYTES NFR BLD AUTO: 0.6 % (ref 0–0.5)
LYMPHOCYTES # BLD AUTO: 3 K/UL (ref 1–4.8)
LYMPHOCYTES NFR BLD: 19.1 % (ref 18–48)
MAGNESIUM SERPL-MCNC: 1.3 MG/DL (ref 1.6–2.6)
MCH RBC QN AUTO: 29.1 PG (ref 27–31)
MCHC RBC AUTO-ENTMCNC: 29.7 G/DL (ref 32–36)
MCV RBC AUTO: 98 FL (ref 82–98)
MONOCYTES # BLD AUTO: 1.1 K/UL (ref 0.3–1)
MONOCYTES NFR BLD: 6.9 % (ref 4–15)
NEUTROPHILS # BLD AUTO: 11.4 K/UL (ref 1.8–7.7)
NEUTROPHILS NFR BLD: 72.2 % (ref 38–73)
NRBC BLD-RTO: 0 /100 WBC
PHOSPHATE SERPL-MCNC: 1.6 MG/DL (ref 2.7–4.5)
PLATELET # BLD AUTO: 381 K/UL (ref 150–450)
PMV BLD AUTO: 9.2 FL (ref 9.2–12.9)
POTASSIUM SERPL-SCNC: 4.6 MMOL/L (ref 3.5–5.1)
RBC # BLD AUTO: 2.85 M/UL (ref 4–5.4)
SODIUM SERPL-SCNC: 148 MMOL/L (ref 136–145)
WBC # BLD AUTO: 15.85 K/UL (ref 3.9–12.7)

## 2021-04-09 PROCEDURE — 80069 RENAL FUNCTION PANEL: CPT | Performed by: INTERNAL MEDICINE

## 2021-04-09 PROCEDURE — 36415 COLL VENOUS BLD VENIPUNCTURE: CPT | Performed by: INTERNAL MEDICINE

## 2021-04-09 PROCEDURE — 83735 ASSAY OF MAGNESIUM: CPT | Performed by: INTERNAL MEDICINE

## 2021-04-09 PROCEDURE — 85025 COMPLETE CBC W/AUTO DIFF WBC: CPT | Performed by: INTERNAL MEDICINE

## 2021-04-12 ENCOUNTER — PATIENT MESSAGE (OUTPATIENT)
Dept: TRANSPLANT | Facility: CLINIC | Age: 71
End: 2021-04-12

## 2021-04-12 ENCOUNTER — LAB VISIT (OUTPATIENT)
Dept: LAB | Facility: HOSPITAL | Age: 71
End: 2021-04-12
Attending: NURSE PRACTITIONER
Payer: MEDICARE

## 2021-04-12 DIAGNOSIS — Z94.0 KIDNEY REPLACED BY TRANSPLANT: ICD-10-CM

## 2021-04-12 DIAGNOSIS — Z94.0 IMMUNOSUPPRESSIVE MANAGEMENT ENCOUNTER FOLLOWING KIDNEY TRANSPLANT: ICD-10-CM

## 2021-04-12 DIAGNOSIS — E83.42 HYPOMAGNESEMIA: ICD-10-CM

## 2021-04-12 DIAGNOSIS — Z57.8 OCCUPATIONAL EXPOSURE TO OTHER RISK FACTORS: ICD-10-CM

## 2021-04-12 DIAGNOSIS — Z79.899 IMMUNOSUPPRESSIVE MANAGEMENT ENCOUNTER FOLLOWING KIDNEY TRANSPLANT: ICD-10-CM

## 2021-04-12 DIAGNOSIS — T86.19 RECEIVED KIDNEY FROM DONOR WITH HEPATITIS C: ICD-10-CM

## 2021-04-12 LAB
ALBUMIN SERPL BCP-MCNC: 3.2 G/DL (ref 3.5–5.2)
ALBUMIN SERPL BCP-MCNC: 3.2 G/DL (ref 3.5–5.2)
ALP SERPL-CCNC: 69 U/L (ref 55–135)
ALT SERPL W/O P-5'-P-CCNC: 30 U/L (ref 10–44)
ANION GAP SERPL CALC-SCNC: 9 MMOL/L (ref 8–16)
AST SERPL-CCNC: 20 U/L (ref 10–40)
BASOPHILS # BLD AUTO: 0.04 K/UL (ref 0–0.2)
BASOPHILS NFR BLD: 0.3 % (ref 0–1.9)
BILIRUB DIRECT SERPL-MCNC: 0.1 MG/DL (ref 0.1–0.3)
BILIRUB SERPL-MCNC: 0.3 MG/DL (ref 0.1–1)
BUN SERPL-MCNC: 14 MG/DL (ref 8–23)
CALCIUM SERPL-MCNC: 9 MG/DL (ref 8.7–10.5)
CHLORIDE SERPL-SCNC: 111 MMOL/L (ref 95–110)
CO2 SERPL-SCNC: 21 MMOL/L (ref 23–29)
CREAT SERPL-MCNC: 1 MG/DL (ref 0.5–1.4)
DIFFERENTIAL METHOD: ABNORMAL
EOSINOPHIL # BLD AUTO: 0.2 K/UL (ref 0–0.5)
EOSINOPHIL NFR BLD: 1.2 % (ref 0–8)
ERYTHROCYTE [DISTWIDTH] IN BLOOD BY AUTOMATED COUNT: 15.9 % (ref 11.5–14.5)
EST. GFR  (AFRICAN AMERICAN): >60 ML/MIN/1.73 M^2
EST. GFR  (NON AFRICAN AMERICAN): 56.4 ML/MIN/1.73 M^2
GLUCOSE SERPL-MCNC: 166 MG/DL (ref 70–110)
HCT VFR BLD AUTO: 28.6 % (ref 37–48.5)
HGB BLD-MCNC: 8.7 G/DL (ref 12–16)
IMM GRANULOCYTES # BLD AUTO: 0.07 K/UL (ref 0–0.04)
IMM GRANULOCYTES NFR BLD AUTO: 0.6 % (ref 0–0.5)
LYMPHOCYTES # BLD AUTO: 2.8 K/UL (ref 1–4.8)
LYMPHOCYTES NFR BLD: 23.1 % (ref 18–48)
MAGNESIUM SERPL-MCNC: 1.3 MG/DL (ref 1.6–2.6)
MCH RBC QN AUTO: 29.4 PG (ref 27–31)
MCHC RBC AUTO-ENTMCNC: 30.4 G/DL (ref 32–36)
MCV RBC AUTO: 97 FL (ref 82–98)
MONOCYTES # BLD AUTO: 0.8 K/UL (ref 0.3–1)
MONOCYTES NFR BLD: 6.4 % (ref 4–15)
NEUTROPHILS # BLD AUTO: 8.3 K/UL (ref 1.8–7.7)
NEUTROPHILS NFR BLD: 68.4 % (ref 38–73)
NRBC BLD-RTO: 0 /100 WBC
PHOSPHATE SERPL-MCNC: 1.6 MG/DL (ref 2.7–4.5)
PLATELET # BLD AUTO: 410 K/UL (ref 150–450)
PMV BLD AUTO: 8.9 FL (ref 9.2–12.9)
POTASSIUM SERPL-SCNC: 4.3 MMOL/L (ref 3.5–5.1)
PROT SERPL-MCNC: 6.3 G/DL (ref 6–8.4)
RBC # BLD AUTO: 2.96 M/UL (ref 4–5.4)
SODIUM SERPL-SCNC: 141 MMOL/L (ref 136–145)
TACROLIMUS BLD-MCNC: 9.5 NG/ML (ref 5–15)
WBC # BLD AUTO: 12.11 K/UL (ref 3.9–12.7)

## 2021-04-12 PROCEDURE — 80069 RENAL FUNCTION PANEL: CPT | Performed by: INTERNAL MEDICINE

## 2021-04-12 PROCEDURE — 84460 ALANINE AMINO (ALT) (SGPT): CPT | Performed by: INTERNAL MEDICINE

## 2021-04-12 PROCEDURE — 80197 ASSAY OF TACROLIMUS: CPT | Performed by: INTERNAL MEDICINE

## 2021-04-12 PROCEDURE — 82247 BILIRUBIN TOTAL: CPT | Performed by: INTERNAL MEDICINE

## 2021-04-12 PROCEDURE — 87522 HEPATITIS C REVRS TRNSCRPJ: CPT | Performed by: INTERNAL MEDICINE

## 2021-04-12 PROCEDURE — 84075 ASSAY ALKALINE PHOSPHATASE: CPT | Performed by: INTERNAL MEDICINE

## 2021-04-12 PROCEDURE — 86803 HEPATITIS C AB TEST: CPT | Performed by: INTERNAL MEDICINE

## 2021-04-12 PROCEDURE — 85025 COMPLETE CBC W/AUTO DIFF WBC: CPT | Performed by: INTERNAL MEDICINE

## 2021-04-12 PROCEDURE — 83735 ASSAY OF MAGNESIUM: CPT | Performed by: INTERNAL MEDICINE

## 2021-04-12 RX ORDER — LANOLIN ALCOHOL/MO/W.PET/CERES
CREAM (GRAM) TOPICAL
Qty: 189 TABLET | Refills: 3 | Status: SHIPPED | OUTPATIENT
Start: 2021-04-12 | End: 2021-09-13

## 2021-04-12 SDOH — SOCIAL DETERMINANTS OF HEALTH (SDOH): OCCUPATIONAL EXPOSURE TO OTHER RISK FACTORS: Z57.8

## 2021-04-12 NOTE — TELEPHONE ENCOUNTER
Notified patient of Dr. Torres review of 4/12/212 lab results. Patient reports she was prescribed protonix about 2 years ago. Patient states she will hold the protonix to see if it will help her magnesium level. Patient reports she's taking magnesium oxide 2 hours apart from her other medications. Instructed patient to increase magnesium oxide to 1200mg (3 tabs) 3 x daily for 3 daily; then switch back to 800mg (2 tabs) for 3 x daily on Thurs. 4/15/21. Instructed patient to increase intake of foods high in phosphorus. Patient verbalized understanding.        ----- Message from Dino Torres MD sent at 4/12/2021 11:02 AM CDT -----  She has hypomagnesemia on PPI twice a day?  Does she still need this, please ask for upper GI symptoms or h/o esophagitis reflux or peptic ulcer. Perhaps we can use Famotidine instead   In the mean time verify she is taking Mag oxide correctly, if she is, increase magnesium oxide  to 1200 tid for three days and then go back to 800 tid

## 2021-04-12 NOTE — TELEPHONE ENCOUNTER
Contacted patient with updated recommendations from Dr. Torres regarding protonix medication. Informed patient to go ahead and hold the protonix as previously advised & start famotidine 20mg daily. Instructed patient to notify transplant nephrologist or coordinator if she develops GI intolerance. Patient & caregiver, Kail verbalized understanding.      ----- Message from Dino Torres MD sent at 4/12/2021  5:00 PM CDT -----  Ok Thanks for the update. Let's hold protonix and switch to famotidine 20 mg po daily  Please advise patient to let us know if GI intolerance (dyspepsia) after the switch

## 2021-04-13 LAB — HCV AB SERPL QL IA: NEGATIVE

## 2021-04-13 RX ORDER — FAMOTIDINE 20 MG/1
20 TABLET, FILM COATED ORAL DAILY
Qty: 30 TABLET | Refills: 4 | Status: SHIPPED | OUTPATIENT
Start: 2021-04-13

## 2021-04-14 ENCOUNTER — OFFICE VISIT (OUTPATIENT)
Dept: TRANSPLANT | Facility: CLINIC | Age: 71
End: 2021-04-14
Payer: MEDICARE

## 2021-04-14 ENCOUNTER — CLINICAL SUPPORT (OUTPATIENT)
Dept: TRANSPLANT | Facility: CLINIC | Age: 71
End: 2021-04-14
Payer: MEDICARE

## 2021-04-14 ENCOUNTER — EXTERNAL HOME HEALTH (OUTPATIENT)
Dept: HOME HEALTH SERVICES | Facility: HOSPITAL | Age: 71
End: 2021-04-14

## 2021-04-14 VITALS
HEIGHT: 63 IN | TEMPERATURE: 98 F | RESPIRATION RATE: 16 BRPM | WEIGHT: 154.56 LBS | BODY MASS INDEX: 27.39 KG/M2 | OXYGEN SATURATION: 96 % | DIASTOLIC BLOOD PRESSURE: 69 MMHG | SYSTOLIC BLOOD PRESSURE: 165 MMHG | HEART RATE: 75 BPM

## 2021-04-14 VITALS
DIASTOLIC BLOOD PRESSURE: 69 MMHG | SYSTOLIC BLOOD PRESSURE: 165 MMHG | DIASTOLIC BLOOD PRESSURE: 69 MMHG | HEIGHT: 63 IN | HEART RATE: 75 BPM | OXYGEN SATURATION: 96 % | BODY MASS INDEX: 27.39 KG/M2 | WEIGHT: 154.56 LBS | TEMPERATURE: 98 F | SYSTOLIC BLOOD PRESSURE: 165 MMHG | RESPIRATION RATE: 16 BRPM

## 2021-04-14 DIAGNOSIS — Z79.4 TYPE 2 DIABETES MELLITUS WITH DIABETIC NEPHROPATHY, WITH LONG-TERM CURRENT USE OF INSULIN: Chronic | ICD-10-CM

## 2021-04-14 DIAGNOSIS — Z94.0 S/P KIDNEY TRANSPLANT: Primary | ICD-10-CM

## 2021-04-14 DIAGNOSIS — N05.2 MEMBRANOUS GLOMERULONEPHRITIS: ICD-10-CM

## 2021-04-14 DIAGNOSIS — Z79.60 LONG-TERM USE OF IMMUNOSUPPRESSANT MEDICATION: ICD-10-CM

## 2021-04-14 DIAGNOSIS — D63.1 ANEMIA OF RENAL DISEASE: Chronic | ICD-10-CM

## 2021-04-14 DIAGNOSIS — I10 ESSENTIAL HYPERTENSION: Chronic | ICD-10-CM

## 2021-04-14 DIAGNOSIS — N18.2 STAGE 2 CHRONIC KIDNEY DISEASE: ICD-10-CM

## 2021-04-14 DIAGNOSIS — Z29.89 PROPHYLACTIC IMMUNOTHERAPY: ICD-10-CM

## 2021-04-14 DIAGNOSIS — N18.9 ANEMIA OF RENAL DISEASE: Chronic | ICD-10-CM

## 2021-04-14 DIAGNOSIS — E11.21 TYPE 2 DIABETES MELLITUS WITH DIABETIC NEPHROPATHY, WITH LONG-TERM CURRENT USE OF INSULIN: Chronic | ICD-10-CM

## 2021-04-14 DIAGNOSIS — D63.8 ANEMIA OF CHRONIC DISEASE: Primary | ICD-10-CM

## 2021-04-14 DIAGNOSIS — S30.1XXA ABDOMINAL WALL SEROMA, INITIAL ENCOUNTER: ICD-10-CM

## 2021-04-14 PROBLEM — Z76.82 PATIENT ON WAITING LIST FOR KIDNEY TRANSPLANT: Chronic | Status: RESOLVED | Noted: 2018-01-09 | Resolved: 2021-04-14

## 2021-04-14 PROBLEM — Z76.82 KIDNEY TRANSPLANT CANDIDATE: Status: RESOLVED | Noted: 2021-02-08 | Resolved: 2021-04-14

## 2021-04-14 LAB — HEPATITIS C VIRUS (HCV) RNA DETECTION/QUANTIFICATION RT-PCR: NORMAL IU/ML

## 2021-04-14 PROCEDURE — 99999 PR PBB SHADOW E&M-EST. PATIENT-LVL II: CPT | Mod: PBBFAC,,,

## 2021-04-14 PROCEDURE — 99999 PR PBB SHADOW E&M-EST. PATIENT-LVL II: ICD-10-PCS | Mod: PBBFAC,,,

## 2021-04-14 PROCEDURE — 1101F PR PT FALLS ASSESS DOC 0-1 FALLS W/OUT INJ PAST YR: ICD-10-PCS | Mod: CPTII,S$GLB,, | Performed by: NURSE PRACTITIONER

## 2021-04-14 PROCEDURE — 3078F DIAST BP <80 MM HG: CPT | Mod: CPTII,S$GLB,, | Performed by: TRANSPLANT SURGERY

## 2021-04-14 PROCEDURE — 1126F PR PAIN SEVERITY QUANTIFIED, NO PAIN PRESENT: ICD-10-PCS | Mod: S$GLB,,, | Performed by: NURSE PRACTITIONER

## 2021-04-14 PROCEDURE — 1126F AMNT PAIN NOTED NONE PRSNT: CPT | Mod: S$GLB,,, | Performed by: NURSE PRACTITIONER

## 2021-04-14 PROCEDURE — 96372 PR INJECTION,THERAP/PROPH/DIAG2ST, IM OR SUBCUT: ICD-10-PCS | Mod: S$GLB,,, | Performed by: INTERNAL MEDICINE

## 2021-04-14 PROCEDURE — 96372 THER/PROPH/DIAG INJ SC/IM: CPT | Mod: S$GLB,,, | Performed by: INTERNAL MEDICINE

## 2021-04-14 PROCEDURE — 3288F PR FALLS RISK ASSESSMENT DOCUMENTED: ICD-10-PCS | Mod: CPTII,S$GLB,, | Performed by: NURSE PRACTITIONER

## 2021-04-14 PROCEDURE — 3077F PR MOST RECENT SYSTOLIC BLOOD PRESSURE >= 140 MM HG: ICD-10-PCS | Mod: CPTII,S$GLB,, | Performed by: TRANSPLANT SURGERY

## 2021-04-14 PROCEDURE — 3077F SYST BP >= 140 MM HG: CPT | Mod: CPTII,S$GLB,, | Performed by: TRANSPLANT SURGERY

## 2021-04-14 PROCEDURE — 1159F PR MEDICATION LIST DOCUMENTED IN MEDICAL RECORD: ICD-10-PCS | Mod: S$GLB,,, | Performed by: TRANSPLANT SURGERY

## 2021-04-14 PROCEDURE — 99215 PR OFFICE/OUTPT VISIT, EST, LEVL V, 40-54 MIN: ICD-10-PCS | Mod: S$GLB,,, | Performed by: NURSE PRACTITIONER

## 2021-04-14 PROCEDURE — 1159F MED LIST DOCD IN RCRD: CPT | Mod: S$GLB,,, | Performed by: NURSE PRACTITIONER

## 2021-04-14 PROCEDURE — 99999 PR PBB SHADOW E&M-EST. PATIENT-LVL V: ICD-10-PCS | Mod: PBBFAC,,, | Performed by: NURSE PRACTITIONER

## 2021-04-14 PROCEDURE — 3008F PR BODY MASS INDEX (BMI) DOCUMENTED: ICD-10-PCS | Mod: CPTII,S$GLB,, | Performed by: NURSE PRACTITIONER

## 2021-04-14 PROCEDURE — 1159F MED LIST DOCD IN RCRD: CPT | Mod: S$GLB,,, | Performed by: TRANSPLANT SURGERY

## 2021-04-14 PROCEDURE — 3078F PR MOST RECENT DIASTOLIC BLOOD PRESSURE < 80 MM HG: ICD-10-PCS | Mod: CPTII,S$GLB,, | Performed by: NURSE PRACTITIONER

## 2021-04-14 PROCEDURE — 99215 OFFICE O/P EST HI 40 MIN: CPT | Mod: S$GLB,,, | Performed by: NURSE PRACTITIONER

## 2021-04-14 PROCEDURE — 99215 OFFICE O/P EST HI 40 MIN: CPT | Mod: 24,S$GLB,, | Performed by: TRANSPLANT SURGERY

## 2021-04-14 PROCEDURE — 3078F DIAST BP <80 MM HG: CPT | Mod: CPTII,S$GLB,, | Performed by: NURSE PRACTITIONER

## 2021-04-14 PROCEDURE — 1159F PR MEDICATION LIST DOCUMENTED IN MEDICAL RECORD: ICD-10-PCS | Mod: S$GLB,,, | Performed by: NURSE PRACTITIONER

## 2021-04-14 PROCEDURE — 3078F PR MOST RECENT DIASTOLIC BLOOD PRESSURE < 80 MM HG: ICD-10-PCS | Mod: CPTII,S$GLB,, | Performed by: TRANSPLANT SURGERY

## 2021-04-14 PROCEDURE — 3077F SYST BP >= 140 MM HG: CPT | Mod: CPTII,S$GLB,, | Performed by: NURSE PRACTITIONER

## 2021-04-14 PROCEDURE — 3077F PR MOST RECENT SYSTOLIC BLOOD PRESSURE >= 140 MM HG: ICD-10-PCS | Mod: CPTII,S$GLB,, | Performed by: NURSE PRACTITIONER

## 2021-04-14 PROCEDURE — 3008F BODY MASS INDEX DOCD: CPT | Mod: CPTII,S$GLB,, | Performed by: NURSE PRACTITIONER

## 2021-04-14 PROCEDURE — 99999 PR PBB SHADOW E&M-EST. PATIENT-LVL V: CPT | Mod: PBBFAC,,, | Performed by: NURSE PRACTITIONER

## 2021-04-14 PROCEDURE — 1101F PT FALLS ASSESS-DOCD LE1/YR: CPT | Mod: CPTII,S$GLB,, | Performed by: NURSE PRACTITIONER

## 2021-04-14 PROCEDURE — 3288F FALL RISK ASSESSMENT DOCD: CPT | Mod: CPTII,S$GLB,, | Performed by: NURSE PRACTITIONER

## 2021-04-14 PROCEDURE — 99215 PR OFFICE/OUTPT VISIT, EST, LEVL V, 40-54 MIN: ICD-10-PCS | Mod: 24,S$GLB,, | Performed by: TRANSPLANT SURGERY

## 2021-04-14 RX ORDER — PEN NEEDLE, DIABETIC 30 GX3/16"
1 NEEDLE, DISPOSABLE MISCELLANEOUS 4 TIMES DAILY
Qty: 100 EACH | Refills: 0 | Status: SHIPPED | OUTPATIENT
Start: 2021-04-14 | End: 2021-04-27 | Stop reason: SDUPTHER

## 2021-04-14 RX ORDER — FUROSEMIDE 20 MG/1
20 TABLET ORAL DAILY
Qty: 7 TABLET | Refills: 0 | Status: SHIPPED | OUTPATIENT
Start: 2021-04-14 | End: 2022-09-19

## 2021-04-15 ENCOUNTER — LAB VISIT (OUTPATIENT)
Dept: LAB | Facility: HOSPITAL | Age: 71
End: 2021-04-15
Payer: MEDICARE

## 2021-04-15 ENCOUNTER — PATIENT MESSAGE (OUTPATIENT)
Dept: TRANSPLANT | Facility: CLINIC | Age: 71
End: 2021-04-15

## 2021-04-15 ENCOUNTER — PATIENT MESSAGE (OUTPATIENT)
Dept: ADMINISTRATIVE | Facility: OTHER | Age: 71
End: 2021-04-15

## 2021-04-15 DIAGNOSIS — Z94.0 KIDNEY REPLACED BY TRANSPLANT: ICD-10-CM

## 2021-04-15 LAB
ALBUMIN SERPL BCP-MCNC: 3.7 G/DL (ref 3.5–5.2)
ANION GAP SERPL CALC-SCNC: 9 MMOL/L (ref 8–16)
BASOPHILS # BLD AUTO: 0.04 K/UL (ref 0–0.2)
BASOPHILS NFR BLD: 0.3 % (ref 0–1.9)
BUN SERPL-MCNC: 20 MG/DL (ref 8–23)
CALCIUM SERPL-MCNC: 9.3 MG/DL (ref 8.7–10.5)
CHLORIDE SERPL-SCNC: 111 MMOL/L (ref 95–110)
CO2 SERPL-SCNC: 22 MMOL/L (ref 23–29)
CREAT SERPL-MCNC: 1.1 MG/DL (ref 0.5–1.4)
DIFFERENTIAL METHOD: ABNORMAL
EOSINOPHIL # BLD AUTO: 0.1 K/UL (ref 0–0.5)
EOSINOPHIL NFR BLD: 0.8 % (ref 0–8)
ERYTHROCYTE [DISTWIDTH] IN BLOOD BY AUTOMATED COUNT: 15.8 % (ref 11.5–14.5)
EST. GFR  (AFRICAN AMERICAN): 58 ML/MIN/1.73 M^2
EST. GFR  (NON AFRICAN AMERICAN): 50.3 ML/MIN/1.73 M^2
GLUCOSE SERPL-MCNC: 176 MG/DL (ref 70–110)
HCT VFR BLD AUTO: 32.5 % (ref 37–48.5)
HGB BLD-MCNC: 9.8 G/DL (ref 12–16)
IMM GRANULOCYTES # BLD AUTO: 0.07 K/UL (ref 0–0.04)
IMM GRANULOCYTES NFR BLD AUTO: 0.5 % (ref 0–0.5)
LYMPHOCYTES # BLD AUTO: 2.5 K/UL (ref 1–4.8)
LYMPHOCYTES NFR BLD: 18.7 % (ref 18–48)
MAGNESIUM SERPL-MCNC: 1.7 MG/DL (ref 1.6–2.6)
MCH RBC QN AUTO: 29.4 PG (ref 27–31)
MCHC RBC AUTO-ENTMCNC: 30.2 G/DL (ref 32–36)
MCV RBC AUTO: 98 FL (ref 82–98)
MONOCYTES # BLD AUTO: 0.9 K/UL (ref 0.3–1)
MONOCYTES NFR BLD: 6.8 % (ref 4–15)
NEUTROPHILS # BLD AUTO: 9.7 K/UL (ref 1.8–7.7)
NEUTROPHILS NFR BLD: 72.9 % (ref 38–73)
NRBC BLD-RTO: 0 /100 WBC
PHOSPHATE SERPL-MCNC: 1.6 MG/DL (ref 2.7–4.5)
PLATELET # BLD AUTO: 327 K/UL (ref 150–450)
PMV BLD AUTO: 10.2 FL (ref 9.2–12.9)
POTASSIUM SERPL-SCNC: 4.5 MMOL/L (ref 3.5–5.1)
RBC # BLD AUTO: 3.33 M/UL (ref 4–5.4)
SODIUM SERPL-SCNC: 142 MMOL/L (ref 136–145)
TACROLIMUS BLD-MCNC: 9.6 NG/ML (ref 5–15)
WBC # BLD AUTO: 13.29 K/UL (ref 3.9–12.7)

## 2021-04-15 PROCEDURE — 85025 COMPLETE CBC W/AUTO DIFF WBC: CPT | Performed by: INTERNAL MEDICINE

## 2021-04-15 PROCEDURE — 80197 ASSAY OF TACROLIMUS: CPT | Performed by: INTERNAL MEDICINE

## 2021-04-15 PROCEDURE — 36415 COLL VENOUS BLD VENIPUNCTURE: CPT | Performed by: INTERNAL MEDICINE

## 2021-04-15 PROCEDURE — 80069 RENAL FUNCTION PANEL: CPT | Performed by: INTERNAL MEDICINE

## 2021-04-15 PROCEDURE — 83735 ASSAY OF MAGNESIUM: CPT | Performed by: INTERNAL MEDICINE

## 2021-04-15 RX ORDER — TACROLIMUS 1 MG/1
4 CAPSULE ORAL EVERY 12 HOURS
Qty: 240 CAPSULE | Refills: 11 | Status: SHIPPED | OUTPATIENT
Start: 2021-04-15 | End: 2021-04-29

## 2021-04-15 NOTE — TELEPHONE ENCOUNTER
Notified patient of Dr. Torres review of 4/15/21 lab results via [facility]. Instructions given to decrease   tacro dose to 4mg by mouth twice daily; continue taking K Phos Neutral as prescribed & increase food intake of high phosphorus foods.     Hey Ms. Benton,     Dr. Torres reviewed your 4/15/21 lab results. Your tacrolimus dose is slightly higher than it should be. Please decrease your tacrolimus dose to 4mg by mouth twice daily; continue taking K Phos Neutral as prescribed & increase your food intake of high phosphorus foods.     Thanks,     Era      ----- Message from Dino Torres MD sent at 4/15/2021 11:27 AM CDT -----  Please discuss prograf to 4 mg po bid  Keep KPN supplements same dose for now  Encourage phosphorus rich meals

## 2021-04-19 ENCOUNTER — PATIENT MESSAGE (OUTPATIENT)
Dept: ADMINISTRATIVE | Facility: OTHER | Age: 71
End: 2021-04-19

## 2021-04-20 ENCOUNTER — OFFICE VISIT (OUTPATIENT)
Dept: ENDOCRINOLOGY | Facility: CLINIC | Age: 71
End: 2021-04-20
Payer: MEDICARE

## 2021-04-20 VITALS
SYSTOLIC BLOOD PRESSURE: 134 MMHG | BODY MASS INDEX: 25.96 KG/M2 | WEIGHT: 146.5 LBS | DIASTOLIC BLOOD PRESSURE: 80 MMHG | HEIGHT: 63 IN

## 2021-04-20 DIAGNOSIS — Z13.820 SCREENING FOR OSTEOPOROSIS: ICD-10-CM

## 2021-04-20 DIAGNOSIS — E11.21 TYPE 2 DIABETES MELLITUS WITH DIABETIC NEPHROPATHY, WITH LONG-TERM CURRENT USE OF INSULIN: Chronic | ICD-10-CM

## 2021-04-20 DIAGNOSIS — E03.9 HYPOTHYROIDISM, UNSPECIFIED TYPE: Primary | ICD-10-CM

## 2021-04-20 DIAGNOSIS — Z94.0 S/P KIDNEY TRANSPLANT: ICD-10-CM

## 2021-04-20 DIAGNOSIS — Z79.4 TYPE 2 DIABETES MELLITUS WITH DIABETIC NEPHROPATHY, WITH LONG-TERM CURRENT USE OF INSULIN: Chronic | ICD-10-CM

## 2021-04-20 DIAGNOSIS — E11.9 TYPE 2 DIABETES MELLITUS WITHOUT COMPLICATION, UNSPECIFIED WHETHER LONG TERM INSULIN USE: ICD-10-CM

## 2021-04-20 PROCEDURE — 99214 PR OFFICE/OUTPT VISIT, EST, LEVL IV, 30-39 MIN: ICD-10-PCS | Mod: GC,S$GLB,, | Performed by: STUDENT IN AN ORGANIZED HEALTH CARE EDUCATION/TRAINING PROGRAM

## 2021-04-20 PROCEDURE — 1159F MED LIST DOCD IN RCRD: CPT | Mod: GC,S$GLB,, | Performed by: STUDENT IN AN ORGANIZED HEALTH CARE EDUCATION/TRAINING PROGRAM

## 2021-04-20 PROCEDURE — 1159F PR MEDICATION LIST DOCUMENTED IN MEDICAL RECORD: ICD-10-PCS | Mod: GC,S$GLB,, | Performed by: STUDENT IN AN ORGANIZED HEALTH CARE EDUCATION/TRAINING PROGRAM

## 2021-04-20 PROCEDURE — 99214 OFFICE O/P EST MOD 30 MIN: CPT | Mod: GC,S$GLB,, | Performed by: STUDENT IN AN ORGANIZED HEALTH CARE EDUCATION/TRAINING PROGRAM

## 2021-04-20 PROCEDURE — 99999 PR PBB SHADOW E&M-EST. PATIENT-LVL V: CPT | Mod: PBBFAC,GC,, | Performed by: STUDENT IN AN ORGANIZED HEALTH CARE EDUCATION/TRAINING PROGRAM

## 2021-04-20 PROCEDURE — 99999 PR PBB SHADOW E&M-EST. PATIENT-LVL V: ICD-10-PCS | Mod: PBBFAC,GC,, | Performed by: STUDENT IN AN ORGANIZED HEALTH CARE EDUCATION/TRAINING PROGRAM

## 2021-04-20 RX ORDER — GLUCAGON 1 MG
1 VIAL (EA) INJECTION
Qty: 1 EACH | Refills: 2 | Status: SHIPPED | OUTPATIENT
Start: 2021-04-20 | End: 2021-04-24 | Stop reason: SDUPTHER

## 2021-04-20 RX ORDER — INSULIN ASPART 100 [IU]/ML
12 INJECTION, SOLUTION INTRAVENOUS; SUBCUTANEOUS 3 TIMES DAILY
Qty: 15 ML | Refills: 1 | Status: SHIPPED | OUTPATIENT
Start: 2021-04-20 | End: 2021-04-28 | Stop reason: SDUPTHER

## 2021-04-21 ENCOUNTER — OFFICE VISIT (OUTPATIENT)
Dept: TRANSPLANT | Facility: CLINIC | Age: 71
End: 2021-04-21
Payer: MEDICARE

## 2021-04-21 ENCOUNTER — PROCEDURE VISIT (OUTPATIENT)
Dept: UROLOGY | Facility: CLINIC | Age: 71
End: 2021-04-21
Payer: MEDICARE

## 2021-04-21 ENCOUNTER — CLINICAL SUPPORT (OUTPATIENT)
Dept: TRANSPLANT | Facility: CLINIC | Age: 71
End: 2021-04-21
Payer: MEDICARE

## 2021-04-21 ENCOUNTER — PATIENT MESSAGE (OUTPATIENT)
Dept: TRANSPLANT | Facility: CLINIC | Age: 71
End: 2021-04-21

## 2021-04-21 VITALS
HEART RATE: 80 BPM | BODY MASS INDEX: 25.76 KG/M2 | HEIGHT: 63 IN | RESPIRATION RATE: 16 BRPM | WEIGHT: 145.38 LBS | SYSTOLIC BLOOD PRESSURE: 177 MMHG | DIASTOLIC BLOOD PRESSURE: 77 MMHG

## 2021-04-21 VITALS
HEIGHT: 63 IN | TEMPERATURE: 98 F | OXYGEN SATURATION: 100 % | WEIGHT: 146.19 LBS | WEIGHT: 146.19 LBS | DIASTOLIC BLOOD PRESSURE: 68 MMHG | SYSTOLIC BLOOD PRESSURE: 167 MMHG | BODY MASS INDEX: 25.9 KG/M2 | RESPIRATION RATE: 16 BRPM | HEIGHT: 63 IN | TEMPERATURE: 98 F | BODY MASS INDEX: 25.9 KG/M2 | OXYGEN SATURATION: 100 % | DIASTOLIC BLOOD PRESSURE: 68 MMHG | HEART RATE: 89 BPM | SYSTOLIC BLOOD PRESSURE: 167 MMHG | HEART RATE: 89 BPM | RESPIRATION RATE: 16 BRPM

## 2021-04-21 DIAGNOSIS — Z51.81 ENCOUNTER FOR THERAPEUTIC DRUG MONITORING: Primary | ICD-10-CM

## 2021-04-21 DIAGNOSIS — D63.8 ANEMIA OF CHRONIC DISEASE: Primary | ICD-10-CM

## 2021-04-21 DIAGNOSIS — Z94.0 KIDNEY REPLACED BY TRANSPLANT: ICD-10-CM

## 2021-04-21 PROCEDURE — 3288F PR FALLS RISK ASSESSMENT DOCUMENTED: ICD-10-PCS | Mod: CPTII,S$GLB,, | Performed by: SURGERY

## 2021-04-21 PROCEDURE — 99999 PR PBB SHADOW E&M-EST. PATIENT-LVL III: ICD-10-PCS | Mod: PBBFAC,,,

## 2021-04-21 PROCEDURE — 1101F PR PT FALLS ASSESS DOC 0-1 FALLS W/OUT INJ PAST YR: ICD-10-PCS | Mod: CPTII,S$GLB,, | Performed by: SURGERY

## 2021-04-21 PROCEDURE — 1126F PR PAIN SEVERITY QUANTIFIED, NO PAIN PRESENT: ICD-10-PCS | Mod: S$GLB,,, | Performed by: SURGERY

## 2021-04-21 PROCEDURE — 52310 PR CYSTOSCOPY,REMV CALCULUS,SIMPLE: ICD-10-PCS | Mod: S$GLB,,, | Performed by: UROLOGY

## 2021-04-21 PROCEDURE — 3008F PR BODY MASS INDEX (BMI) DOCUMENTED: ICD-10-PCS | Mod: CPTII,S$GLB,, | Performed by: SURGERY

## 2021-04-21 PROCEDURE — 96372 THER/PROPH/DIAG INJ SC/IM: CPT | Mod: S$GLB,,, | Performed by: INTERNAL MEDICINE

## 2021-04-21 PROCEDURE — 3288F FALL RISK ASSESSMENT DOCD: CPT | Mod: CPTII,S$GLB,, | Performed by: SURGERY

## 2021-04-21 PROCEDURE — 96372 PR INJECTION,THERAP/PROPH/DIAG2ST, IM OR SUBCUT: ICD-10-PCS | Mod: S$GLB,,, | Performed by: INTERNAL MEDICINE

## 2021-04-21 PROCEDURE — 1159F MED LIST DOCD IN RCRD: CPT | Mod: S$GLB,,, | Performed by: SURGERY

## 2021-04-21 PROCEDURE — 1126F AMNT PAIN NOTED NONE PRSNT: CPT | Mod: S$GLB,,, | Performed by: SURGERY

## 2021-04-21 PROCEDURE — 99999 PR PBB SHADOW E&M-EST. PATIENT-LVL III: CPT | Mod: PBBFAC,,,

## 2021-04-21 PROCEDURE — 52310 CYSTOSCOPY AND TREATMENT: CPT | Mod: S$GLB,,, | Performed by: UROLOGY

## 2021-04-21 PROCEDURE — 3008F BODY MASS INDEX DOCD: CPT | Mod: CPTII,S$GLB,, | Performed by: SURGERY

## 2021-04-21 PROCEDURE — 1101F PT FALLS ASSESS-DOCD LE1/YR: CPT | Mod: CPTII,S$GLB,, | Performed by: SURGERY

## 2021-04-21 PROCEDURE — 1159F PR MEDICATION LIST DOCUMENTED IN MEDICAL RECORD: ICD-10-PCS | Mod: S$GLB,,, | Performed by: SURGERY

## 2021-04-21 PROCEDURE — 99213 OFFICE O/P EST LOW 20 MIN: CPT | Mod: 24,S$GLB,, | Performed by: SURGERY

## 2021-04-21 PROCEDURE — 99213 PR OFFICE/OUTPT VISIT, EST, LEVL III, 20-29 MIN: ICD-10-PCS | Mod: 24,S$GLB,, | Performed by: SURGERY

## 2021-04-21 RX ORDER — LIDOCAINE HYDROCHLORIDE 20 MG/ML
JELLY TOPICAL
Status: COMPLETED | OUTPATIENT
Start: 2021-04-21 | End: 2021-04-21

## 2021-04-21 RX ADMIN — LIDOCAINE HYDROCHLORIDE: 20 JELLY TOPICAL at 10:04

## 2021-04-26 ENCOUNTER — PATIENT MESSAGE (OUTPATIENT)
Dept: ENDOCRINOLOGY | Facility: CLINIC | Age: 71
End: 2021-04-26

## 2021-04-28 ENCOUNTER — OFFICE VISIT (OUTPATIENT)
Dept: TRANSPLANT | Facility: CLINIC | Age: 71
End: 2021-04-28
Payer: MEDICARE

## 2021-04-28 VITALS
BODY MASS INDEX: 25.35 KG/M2 | TEMPERATURE: 98 F | OXYGEN SATURATION: 97 % | HEART RATE: 89 BPM | WEIGHT: 143.06 LBS | RESPIRATION RATE: 18 BRPM | SYSTOLIC BLOOD PRESSURE: 141 MMHG | HEIGHT: 63 IN | DIASTOLIC BLOOD PRESSURE: 70 MMHG

## 2021-04-28 DIAGNOSIS — Z51.81 ENCOUNTER FOR THERAPEUTIC DRUG MONITORING: ICD-10-CM

## 2021-04-28 DIAGNOSIS — Z79.899 ENCOUNTER FOR LONG-TERM (CURRENT) USE OF OTHER MEDICATIONS: ICD-10-CM

## 2021-04-28 DIAGNOSIS — Z94.0 KIDNEY REPLACED BY TRANSPLANT: ICD-10-CM

## 2021-04-28 DIAGNOSIS — E11.9 TYPE 2 DIABETES MELLITUS WITHOUT COMPLICATION, UNSPECIFIED WHETHER LONG TERM INSULIN USE: ICD-10-CM

## 2021-04-28 DIAGNOSIS — Z29.89 PROPHYLACTIC IMMUNOTHERAPY: ICD-10-CM

## 2021-04-28 DIAGNOSIS — Z79.60 LONG-TERM USE OF IMMUNOSUPPRESSANT MEDICATION: ICD-10-CM

## 2021-04-28 DIAGNOSIS — Z94.0 S/P KIDNEY TRANSPLANT: Primary | ICD-10-CM

## 2021-04-28 PROCEDURE — 99215 OFFICE O/P EST HI 40 MIN: CPT | Mod: 24,S$GLB,, | Performed by: TRANSPLANT SURGERY

## 2021-04-28 PROCEDURE — 3008F PR BODY MASS INDEX (BMI) DOCUMENTED: ICD-10-PCS | Mod: CPTII,S$GLB,, | Performed by: TRANSPLANT SURGERY

## 2021-04-28 PROCEDURE — 1159F PR MEDICATION LIST DOCUMENTED IN MEDICAL RECORD: ICD-10-PCS | Mod: S$GLB,,, | Performed by: TRANSPLANT SURGERY

## 2021-04-28 PROCEDURE — 1159F MED LIST DOCD IN RCRD: CPT | Mod: S$GLB,,, | Performed by: TRANSPLANT SURGERY

## 2021-04-28 PROCEDURE — 3077F SYST BP >= 140 MM HG: CPT | Mod: CPTII,S$GLB,, | Performed by: TRANSPLANT SURGERY

## 2021-04-28 PROCEDURE — 99999 PR PBB SHADOW E&M-EST. PATIENT-LVL III: ICD-10-PCS | Mod: PBBFAC,,,

## 2021-04-28 PROCEDURE — 1126F AMNT PAIN NOTED NONE PRSNT: CPT | Mod: S$GLB,,, | Performed by: TRANSPLANT SURGERY

## 2021-04-28 PROCEDURE — 3078F DIAST BP <80 MM HG: CPT | Mod: CPTII,S$GLB,, | Performed by: TRANSPLANT SURGERY

## 2021-04-28 PROCEDURE — 1101F PT FALLS ASSESS-DOCD LE1/YR: CPT | Mod: CPTII,S$GLB,, | Performed by: TRANSPLANT SURGERY

## 2021-04-28 PROCEDURE — 3288F FALL RISK ASSESSMENT DOCD: CPT | Mod: CPTII,S$GLB,, | Performed by: TRANSPLANT SURGERY

## 2021-04-28 PROCEDURE — 1126F PR PAIN SEVERITY QUANTIFIED, NO PAIN PRESENT: ICD-10-PCS | Mod: S$GLB,,, | Performed by: TRANSPLANT SURGERY

## 2021-04-28 PROCEDURE — 3077F PR MOST RECENT SYSTOLIC BLOOD PRESSURE >= 140 MM HG: ICD-10-PCS | Mod: CPTII,S$GLB,, | Performed by: TRANSPLANT SURGERY

## 2021-04-28 PROCEDURE — 99215 PR OFFICE/OUTPT VISIT, EST, LEVL V, 40-54 MIN: ICD-10-PCS | Mod: 24,S$GLB,, | Performed by: TRANSPLANT SURGERY

## 2021-04-28 PROCEDURE — 99999 PR PBB SHADOW E&M-EST. PATIENT-LVL III: CPT | Mod: PBBFAC,,,

## 2021-04-28 PROCEDURE — 3288F PR FALLS RISK ASSESSMENT DOCUMENTED: ICD-10-PCS | Mod: CPTII,S$GLB,, | Performed by: TRANSPLANT SURGERY

## 2021-04-28 PROCEDURE — 3078F PR MOST RECENT DIASTOLIC BLOOD PRESSURE < 80 MM HG: ICD-10-PCS | Mod: CPTII,S$GLB,, | Performed by: TRANSPLANT SURGERY

## 2021-04-28 PROCEDURE — 1101F PR PT FALLS ASSESS DOC 0-1 FALLS W/OUT INJ PAST YR: ICD-10-PCS | Mod: CPTII,S$GLB,, | Performed by: TRANSPLANT SURGERY

## 2021-04-28 PROCEDURE — 3008F BODY MASS INDEX DOCD: CPT | Mod: CPTII,S$GLB,, | Performed by: TRANSPLANT SURGERY

## 2021-04-28 RX ORDER — PEN NEEDLE, DIABETIC 30 GX3/16"
1 NEEDLE, DISPOSABLE MISCELLANEOUS 4 TIMES DAILY
Qty: 100 EACH | Refills: 0 | Status: SHIPPED | OUTPATIENT
Start: 2021-04-28 | End: 2021-06-04 | Stop reason: SDUPTHER

## 2021-04-28 RX ORDER — INSULIN ASPART 100 [IU]/ML
12 INJECTION, SOLUTION INTRAVENOUS; SUBCUTANEOUS 3 TIMES DAILY
Qty: 15 ML | Refills: 1 | Status: SHIPPED | OUTPATIENT
Start: 2021-04-28 | End: 2021-06-04 | Stop reason: SDUPTHER

## 2021-04-29 ENCOUNTER — PATIENT MESSAGE (OUTPATIENT)
Dept: TRANSPLANT | Facility: CLINIC | Age: 71
End: 2021-04-29

## 2021-04-29 ENCOUNTER — PATIENT MESSAGE (OUTPATIENT)
Dept: ADMINISTRATIVE | Facility: OTHER | Age: 71
End: 2021-04-29

## 2021-04-29 ENCOUNTER — TELEPHONE (OUTPATIENT)
Dept: ENDOCRINOLOGY | Facility: CLINIC | Age: 71
End: 2021-04-29

## 2021-04-29 DIAGNOSIS — Z94.0 KIDNEY REPLACED BY TRANSPLANT: ICD-10-CM

## 2021-04-29 RX ORDER — TACROLIMUS 1 MG/1
3 CAPSULE ORAL EVERY 12 HOURS
Qty: 180 CAPSULE | Refills: 11 | Status: SHIPPED | OUTPATIENT
Start: 2021-04-29 | End: 2021-05-27 | Stop reason: DRUGHIGH

## 2021-04-30 ENCOUNTER — PATIENT MESSAGE (OUTPATIENT)
Dept: ENDOCRINOLOGY | Facility: CLINIC | Age: 71
End: 2021-04-30

## 2021-05-06 ENCOUNTER — DOCUMENTATION ONLY (OUTPATIENT)
Dept: TRANSPLANT | Facility: CLINIC | Age: 71
End: 2021-05-06

## 2021-05-06 ENCOUNTER — PATIENT MESSAGE (OUTPATIENT)
Dept: ADMINISTRATIVE | Facility: OTHER | Age: 71
End: 2021-05-06

## 2021-05-06 ENCOUNTER — OFFICE VISIT (OUTPATIENT)
Dept: TRANSPLANT | Facility: CLINIC | Age: 71
End: 2021-05-06
Payer: MEDICARE

## 2021-05-06 ENCOUNTER — PATIENT MESSAGE (OUTPATIENT)
Dept: TRANSPLANT | Facility: CLINIC | Age: 71
End: 2021-05-06

## 2021-05-06 VITALS — WEIGHT: 140 LBS | SYSTOLIC BLOOD PRESSURE: 130 MMHG | BODY MASS INDEX: 24.8 KG/M2 | DIASTOLIC BLOOD PRESSURE: 63 MMHG

## 2021-05-06 DIAGNOSIS — I63.9 STROKE OF UNKNOWN ETIOLOGY: ICD-10-CM

## 2021-05-06 DIAGNOSIS — Z79.4 TYPE 2 DIABETES MELLITUS WITH DIABETIC NEPHROPATHY, WITH LONG-TERM CURRENT USE OF INSULIN: Chronic | ICD-10-CM

## 2021-05-06 DIAGNOSIS — I10 ESSENTIAL HYPERTENSION: Chronic | ICD-10-CM

## 2021-05-06 DIAGNOSIS — N05.2 MEMBRANOUS GLOMERULONEPHRITIS: ICD-10-CM

## 2021-05-06 DIAGNOSIS — Z29.89 PROPHYLACTIC IMMUNOTHERAPY: ICD-10-CM

## 2021-05-06 DIAGNOSIS — Z94.0 S/P KIDNEY TRANSPLANT: ICD-10-CM

## 2021-05-06 DIAGNOSIS — Z79.60 LONG-TERM USE OF IMMUNOSUPPRESSANT MEDICATION: ICD-10-CM

## 2021-05-06 DIAGNOSIS — N18.2 CKD (CHRONIC KIDNEY DISEASE) STAGE 2, GFR 60-89 ML/MIN: Primary | ICD-10-CM

## 2021-05-06 DIAGNOSIS — E11.21 TYPE 2 DIABETES MELLITUS WITH DIABETIC NEPHROPATHY, WITH LONG-TERM CURRENT USE OF INSULIN: Chronic | ICD-10-CM

## 2021-05-06 LAB
EXT ALBUMIN: 3.5 G/DL (ref 3.4–5)
EXT ALKALINE PHOSPHATASE: 111 U/L (ref 45–117)
EXT ALT: 23 U/L (ref 13–56)
EXT ANC: ABNORMAL
EXT AST: 11 U/L (ref 15–37)
EXT BILIRUBIN DIRECT: 0.1 MG/DL (ref 0–0.2)
EXT BILIRUBIN TOTAL: 0.3 MG/DL (ref 0.2–1)
EXT BUN: 18 MG/DL (ref 7–18)
EXT CALCIUM: 9.4 MG/DL (ref 8.5–10.1)
EXT CHLORIDE: 109 MMOL/L (ref 98–107)
EXT CO2: 25 MMOL/L (ref 21–32)
EXT CREATININE: 0.88 MG/DL (ref 0.55–1.02)
EXT EOSINOPHIL%: 0.6 % (ref 0–6)
EXT GFR MDRD NON AF AMER: >60
EXT GLUCOSE: 203 MG/DL (ref 74–106)
EXT HCV AB: NONREACTIVE
EXT HCV QUANT: NOT DETECTED
EXT HEMATOCRIT: 39.2 % (ref 37–47)
EXT HEMOGLOBIN: 11.9 G/DL (ref 12–16)
EXT LYMPH%: 24.3 % (ref 20–45)
EXT MAGNESIUM: 1.8 MG/DL (ref 1.6–2.6)
EXT MONOCYTES%: 6.9 % (ref 2–10)
EXT PHOSPHORUS: 1.8 MG/DL (ref 4.5–6.7)
EXT PLATELETS: 272 (ref 130–400)
EXT POTASSIUM: 4.6 MMOL/L (ref 3.5–5.1)
EXT PROT/CREAT RATIO UR: 0.13
EXT PROTEIN TOTAL: 6.3
EXT SEGS%: 65.8 % (ref 50–80)
EXT SODIUM: 140 MMOL/L (ref 131–143)
EXT TACROLIMUS LVL: 7.2 NG/ML
EXT WBC: 11.7 (ref 4.5–10)

## 2021-05-06 PROCEDURE — 3078F DIAST BP <80 MM HG: CPT | Mod: CPTII,95,, | Performed by: INTERNAL MEDICINE

## 2021-05-06 PROCEDURE — 3075F PR MOST RECENT SYSTOLIC BLOOD PRESS GE 130-139MM HG: ICD-10-PCS | Mod: CPTII,95,, | Performed by: INTERNAL MEDICINE

## 2021-05-06 PROCEDURE — 3008F BODY MASS INDEX DOCD: CPT | Mod: CPTII,95,, | Performed by: INTERNAL MEDICINE

## 2021-05-06 PROCEDURE — 3075F SYST BP GE 130 - 139MM HG: CPT | Mod: CPTII,95,, | Performed by: INTERNAL MEDICINE

## 2021-05-06 PROCEDURE — 99215 PR OFFICE/OUTPT VISIT, EST, LEVL V, 40-54 MIN: ICD-10-PCS | Mod: 95,,, | Performed by: INTERNAL MEDICINE

## 2021-05-06 PROCEDURE — 3008F PR BODY MASS INDEX (BMI) DOCUMENTED: ICD-10-PCS | Mod: CPTII,95,, | Performed by: INTERNAL MEDICINE

## 2021-05-06 PROCEDURE — 3078F PR MOST RECENT DIASTOLIC BLOOD PRESSURE < 80 MM HG: ICD-10-PCS | Mod: CPTII,95,, | Performed by: INTERNAL MEDICINE

## 2021-05-06 PROCEDURE — 99215 OFFICE O/P EST HI 40 MIN: CPT | Mod: 95,,, | Performed by: INTERNAL MEDICINE

## 2021-05-06 PROCEDURE — 3044F HG A1C LEVEL LT 7.0%: CPT | Mod: CPTII,95,, | Performed by: INTERNAL MEDICINE

## 2021-05-06 PROCEDURE — 1159F MED LIST DOCD IN RCRD: CPT | Mod: 95,,, | Performed by: INTERNAL MEDICINE

## 2021-05-06 PROCEDURE — 1159F PR MEDICATION LIST DOCUMENTED IN MEDICAL RECORD: ICD-10-PCS | Mod: 95,,, | Performed by: INTERNAL MEDICINE

## 2021-05-06 PROCEDURE — 3044F PR MOST RECENT HEMOGLOBIN A1C LEVEL <7.0%: ICD-10-PCS | Mod: CPTII,95,, | Performed by: INTERNAL MEDICINE

## 2021-05-12 ENCOUNTER — PATIENT MESSAGE (OUTPATIENT)
Dept: TRANSPLANT | Facility: CLINIC | Age: 71
End: 2021-05-12

## 2021-05-12 ENCOUNTER — PATIENT MESSAGE (OUTPATIENT)
Dept: RESEARCH | Facility: HOSPITAL | Age: 71
End: 2021-05-12

## 2021-05-14 ENCOUNTER — PATIENT MESSAGE (OUTPATIENT)
Dept: ENDOCRINOLOGY | Facility: CLINIC | Age: 71
End: 2021-05-14

## 2021-05-16 ENCOUNTER — PATIENT MESSAGE (OUTPATIENT)
Dept: TRANSPLANT | Facility: CLINIC | Age: 71
End: 2021-05-16

## 2021-05-17 ENCOUNTER — DOCUMENTATION ONLY (OUTPATIENT)
Dept: TRANSPLANT | Facility: CLINIC | Age: 71
End: 2021-05-17

## 2021-05-17 LAB
EXT ALBUMIN: 3.6 G/DL (ref 3.4–5)
EXT ALKALINE PHOSPHATASE: 101 U/L (ref 45–117)
EXT ALT: 24 U/L (ref 13–56)
EXT ANC: ABNORMAL
EXT AST: 11 U/L (ref 15–37)
EXT BILIRUBIN DIRECT: 0.1 MG/DL (ref 0–0.2)
EXT BILIRUBIN TOTAL: 0.2 MG/DL (ref 0.2–1)
EXT BUN: 23 MG/DL (ref 7–18)
EXT CALCIUM: 9.2 MG/DL (ref 8.5–10.1)
EXT CHLORIDE: 110 MMOL/L (ref 98–107)
EXT CO2: 25 MMOL/L (ref 21–32)
EXT CREATININE: 0.8 MG/DL
EXT EOSINOPHIL%: 1 % (ref 0–6)
EXT GFR MDRD NON AF AMER: >60
EXT GLUCOSE: 208 MG/DL (ref 74–106)
EXT HBV DNA QUANT PCR: ABNORMAL
EXT HCV QUANT: ABNORMAL
EXT HEMATOCRIT: 40.5 % (ref 37–47)
EXT HEMOGLOBIN: 12.4 G/DL (ref 12–16)
EXT HEP B S AG: ABNORMAL
EXT HIV RNA QUANT PCR: ABNORMAL
EXT LYMPH%: 25 % (ref 20–45)
EXT MAGNESIUM: 1.9 MG/DL (ref 1.6–2.6)
EXT MONOCYTES%: 2 % (ref 2–10)
EXT PLATELETS: 309 (ref 130–400)
EXT POTASSIUM: 4.1 MMOL/L (ref 3.5–5.1)
EXT PROT/CREAT RATIO UR: 0.14
EXT PROTEIN TOTAL: 6.2
EXT SEGS%: 67 % (ref 50–80)
EXT SODIUM: 141 MMOL/L (ref 131–143)
EXT TACROLIMUS LVL: 7.5 NG/ML
EXT WBC: 14.8 (ref 4.5–10)
HCV IGG SERPL QL IA: ABNORMAL

## 2021-05-18 ENCOUNTER — PATIENT MESSAGE (OUTPATIENT)
Dept: ENDOCRINOLOGY | Facility: CLINIC | Age: 71
End: 2021-05-18

## 2021-05-19 ENCOUNTER — DOCUMENTATION ONLY (OUTPATIENT)
Dept: TRANSPLANT | Facility: CLINIC | Age: 71
End: 2021-05-19

## 2021-05-19 LAB
EXT ALBUMIN: 4.1
EXT ALKALINE PHOSPHATASE: 116
EXT ALT: 27
EXT ANC: NORMAL
EXT AST: 10
EXT BANDS%: 11
EXT BILIRUBIN DIRECT: <0.1 MG/DL
EXT BILIRUBIN TOTAL: 0.2
EXT BUN: 21
EXT CALCIUM: 9.7
EXT CHLORIDE: 104
EXT CO2: 25
EXT CREATININE: 0.88 MG/DL
EXT EOSINOPHIL%: 0
EXT GFR MDRD NON AF AMER: >60
EXT GLUCOSE: 209
EXT HCV AB: NONREACTIVE
EXT HCV QUANT: NOT DETECTED
EXT HEMATOCRIT: 44
EXT HEMOGLOBIN: 13.5
EXT LYMPH%: 29
EXT MAGNESIUM: 1.9
EXT MONOCYTES%: 6
EXT PHOSPHORUS: 2.4
EXT PLATELETS: 302
EXT POTASSIUM: 4.3
EXT SEGS%: 54
EXT SODIUM: 138 MMOL/L
EXT TACROLIMUS LVL: 10.4
EXT WBC: 16.6

## 2021-05-24 ENCOUNTER — PATIENT MESSAGE (OUTPATIENT)
Dept: TRANSPLANT | Facility: CLINIC | Age: 71
End: 2021-05-24

## 2021-05-27 DIAGNOSIS — Z94.0 KIDNEY REPLACED BY TRANSPLANT: ICD-10-CM

## 2021-05-27 RX ORDER — TACROLIMUS 1 MG/1
CAPSULE ORAL
Qty: 180 CAPSULE | Refills: 11 | Status: SHIPPED | OUTPATIENT
Start: 2021-05-27 | End: 2021-10-21 | Stop reason: SDUPTHER

## 2021-05-30 ENCOUNTER — PATIENT MESSAGE (OUTPATIENT)
Dept: TRANSPLANT | Facility: CLINIC | Age: 71
End: 2021-05-30

## 2021-06-01 ENCOUNTER — PATIENT MESSAGE (OUTPATIENT)
Dept: ENDOCRINOLOGY | Facility: CLINIC | Age: 71
End: 2021-06-01

## 2021-06-03 ENCOUNTER — DOCUMENTATION ONLY (OUTPATIENT)
Dept: TRANSPLANT | Facility: CLINIC | Age: 71
End: 2021-06-03

## 2021-06-03 ENCOUNTER — TELEPHONE (OUTPATIENT)
Dept: ENDOCRINOLOGY | Facility: CLINIC | Age: 71
End: 2021-06-03

## 2021-06-03 LAB
EXT ALBUMIN: 3.8 (ref 3.4–5)
EXT ANC: ABNORMAL
EXT BANDS%: 9 (ref 2–6)
EXT BUN: 25 (ref 7–18)
EXT CALCIUM: 9.3 (ref 8.5–10.1)
EXT CHLORIDE: 110 (ref 98–107)
EXT CO2: 22 (ref 21–32)
EXT CREATININE: 0.79 MG/DL
EXT GFR MDRD NON AF AMER: >60
EXT GLUCOSE: 200 (ref 74–106)
EXT HEMATOCRIT: 44.8 (ref 37–47)
EXT HEMOGLOBIN: 13.8 (ref 12–16)
EXT LYMPH%: 25 (ref 20–45)
EXT MAGNESIUM: 1.9 (ref 1.6–2.6)
EXT MONOCYTES%: 8 (ref 2–10)
EXT PHOSPHORUS: 2.5 (ref 4.5–6.7)
EXT PLATELETS: 234 (ref 130–400)
EXT POTASSIUM: 4.3 (ref 3.5–5.1)
EXT PROT/CREAT RATIO UR: 0.15
EXT SEGS%: 48 (ref 50–80)
EXT SODIUM: 141 MMOL/L (ref 131–143)
EXT TACROLIMUS LVL: 9.3
EXT WBC: 12.1 (ref 4.5–10)

## 2021-06-04 ENCOUNTER — PATIENT MESSAGE (OUTPATIENT)
Dept: ENDOCRINOLOGY | Facility: CLINIC | Age: 71
End: 2021-06-04

## 2021-06-04 DIAGNOSIS — E11.9 TYPE 2 DIABETES MELLITUS WITHOUT COMPLICATION, UNSPECIFIED WHETHER LONG TERM INSULIN USE: ICD-10-CM

## 2021-06-04 RX ORDER — PEN NEEDLE, DIABETIC 30 GX3/16"
1 NEEDLE, DISPOSABLE MISCELLANEOUS 4 TIMES DAILY
Qty: 100 EACH | Refills: 0 | Status: SHIPPED | OUTPATIENT
Start: 2021-06-04 | End: 2021-07-06 | Stop reason: SDUPTHER

## 2021-06-04 RX ORDER — VALGANCICLOVIR 450 MG/1
450 TABLET, FILM COATED ORAL EVERY MORNING
Qty: 30 TABLET | Refills: 1 | Status: SHIPPED | OUTPATIENT
Start: 2021-06-04 | End: 2022-01-11

## 2021-06-04 RX ORDER — INSULIN ASPART 100 [IU]/ML
12 INJECTION, SOLUTION INTRAVENOUS; SUBCUTANEOUS 3 TIMES DAILY
Qty: 15 ML | Refills: 1 | Status: SHIPPED | OUTPATIENT
Start: 2021-06-04 | End: 2021-08-12 | Stop reason: SDUPTHER

## 2021-06-04 RX ORDER — PEN NEEDLE, DIABETIC 30 GX3/16"
1 NEEDLE, DISPOSABLE MISCELLANEOUS 4 TIMES DAILY
Qty: 100 EACH | Refills: 0 | Status: CANCELLED | OUTPATIENT
Start: 2021-06-04

## 2021-06-04 RX ORDER — LINAGLIPTIN 5 MG/1
5 TABLET, FILM COATED ORAL DAILY
Qty: 90 TABLET | Refills: 6 | Status: SHIPPED | OUTPATIENT
Start: 2021-06-04 | End: 2021-10-27 | Stop reason: ALTCHOICE

## 2021-06-17 ENCOUNTER — DOCUMENTATION ONLY (OUTPATIENT)
Dept: TRANSPLANT | Facility: CLINIC | Age: 71
End: 2021-06-17

## 2021-06-17 LAB
EXT ALBUMIN: 3.4 (ref 3.4–5)
EXT ALBUMIN: 3.6 (ref 3.4–5)
EXT ANC: ABNORMAL
EXT ANC: ABNORMAL
EXT BANDS%: 16 (ref 2–6)
EXT BANDS%: 9 (ref 2–6)
EXT BUN: 22 (ref 7–18)
EXT BUN: 27 (ref 7–18)
EXT CALCIUM: 9.1 (ref 8.5–10.1)
EXT CALCIUM: 9.2 (ref 8.5–10.1)
EXT CHLORIDE: 110 (ref 98–107)
EXT CHLORIDE: 112 (ref 98–107)
EXT CO2: 21 (ref 21–32)
EXT CO2: 23 (ref 21–32)
EXT CREATININE: 0.78 MG/DL
EXT CREATININE: 0.8 MG/DL
EXT EOSINOPHIL%: 1 (ref 0–6)
EXT EOSINOPHIL%: 2 (ref 0–6)
EXT GFR MDRD NON AF AMER: >60
EXT GFR MDRD NON AF AMER: >60
EXT GLUCOSE: 189 (ref 74–106)
EXT GLUCOSE: 235 (ref 74–106)
EXT HCV AB: NONREACTIVE
EXT HCV QUANT: NOT DETECTED IU/ML
EXT HEMATOCRIT: 43.5 (ref 37–47)
EXT HEMATOCRIT: 44.6 (ref 37–47)
EXT HEMOGLOBIN: 13.1 (ref 12–16)
EXT HEMOGLOBIN: 14.1 (ref 12–16)
EXT LYMPH%: 19 (ref 20–45)
EXT LYMPH%: 19 (ref 20–45)
EXT MAGNESIUM: 1.6 (ref 1.6–2.6)
EXT MAGNESIUM: 2 (ref 1.6–2.6)
EXT MONOCYTES%: 5 (ref 2–10)
EXT MONOCYTES%: 8 (ref 2–10)
EXT PHOSPHORUS: 2.7 (ref 4.5–6.7)
EXT PHOSPHORUS: 2.7 (ref 4.5–6.7)
EXT PLATELETS: 241 (ref 130–400)
EXT PLATELETS: 272 (ref 130–400)
EXT POTASSIUM: 4.4 (ref 3.5–5.1)
EXT POTASSIUM: 4.6 (ref 3.5–5.1)
EXT SEGS%: 45 (ref 50–80)
EXT SEGS%: 57 (ref 50–80)
EXT SODIUM: 140 MMOL/L (ref 131–143)
EXT SODIUM: 141 MMOL/L (ref 131–143)
EXT TACROLIMUS LVL: 7.6
EXT TACROLIMUS LVL: 8.1 NG/ML
EXT WBC: 10.9 (ref 4.5–10)
EXT WBC: 11.8 (ref 4.5–10)

## 2021-06-18 ENCOUNTER — OFFICE VISIT (OUTPATIENT)
Dept: TRANSPLANT | Facility: CLINIC | Age: 71
End: 2021-06-18
Payer: MEDICARE

## 2021-06-18 VITALS — BODY MASS INDEX: 26.04 KG/M2 | DIASTOLIC BLOOD PRESSURE: 75 MMHG | SYSTOLIC BLOOD PRESSURE: 126 MMHG | WEIGHT: 147 LBS

## 2021-06-18 DIAGNOSIS — I10 ESSENTIAL HYPERTENSION: Chronic | ICD-10-CM

## 2021-06-18 DIAGNOSIS — I63.9 STROKE OF UNKNOWN ETIOLOGY: ICD-10-CM

## 2021-06-18 DIAGNOSIS — Z79.60 LONG-TERM USE OF IMMUNOSUPPRESSANT MEDICATION: ICD-10-CM

## 2021-06-18 DIAGNOSIS — N05.2 MEMBRANOUS GLOMERULONEPHRITIS: ICD-10-CM

## 2021-06-18 DIAGNOSIS — N18.2 CKD (CHRONIC KIDNEY DISEASE) STAGE 2, GFR 60-89 ML/MIN: ICD-10-CM

## 2021-06-18 DIAGNOSIS — Z94.0 S/P KIDNEY TRANSPLANT: Primary | ICD-10-CM

## 2021-06-18 PROCEDURE — 99215 OFFICE O/P EST HI 40 MIN: CPT | Mod: 95,,, | Performed by: INTERNAL MEDICINE

## 2021-06-18 PROCEDURE — 99215 PR OFFICE/OUTPT VISIT, EST, LEVL V, 40-54 MIN: ICD-10-PCS | Mod: 95,,, | Performed by: INTERNAL MEDICINE

## 2021-06-18 PROCEDURE — 3008F BODY MASS INDEX DOCD: CPT | Mod: CPTII,95,, | Performed by: INTERNAL MEDICINE

## 2021-06-18 PROCEDURE — 3078F PR MOST RECENT DIASTOLIC BLOOD PRESSURE < 80 MM HG: ICD-10-PCS | Mod: CPTII,95,, | Performed by: INTERNAL MEDICINE

## 2021-06-18 PROCEDURE — 1159F MED LIST DOCD IN RCRD: CPT | Mod: 95,,, | Performed by: INTERNAL MEDICINE

## 2021-06-18 PROCEDURE — 3078F DIAST BP <80 MM HG: CPT | Mod: CPTII,95,, | Performed by: INTERNAL MEDICINE

## 2021-06-18 PROCEDURE — 3008F PR BODY MASS INDEX (BMI) DOCUMENTED: ICD-10-PCS | Mod: CPTII,95,, | Performed by: INTERNAL MEDICINE

## 2021-06-18 PROCEDURE — 3074F PR MOST RECENT SYSTOLIC BLOOD PRESSURE < 130 MM HG: ICD-10-PCS | Mod: CPTII,95,, | Performed by: INTERNAL MEDICINE

## 2021-06-18 PROCEDURE — 3074F SYST BP LT 130 MM HG: CPT | Mod: CPTII,95,, | Performed by: INTERNAL MEDICINE

## 2021-06-18 PROCEDURE — 1159F PR MEDICATION LIST DOCUMENTED IN MEDICAL RECORD: ICD-10-PCS | Mod: 95,,, | Performed by: INTERNAL MEDICINE

## 2021-06-20 ENCOUNTER — PATIENT MESSAGE (OUTPATIENT)
Dept: ENDOCRINOLOGY | Facility: CLINIC | Age: 71
End: 2021-06-20

## 2021-06-22 ENCOUNTER — DOCUMENTATION ONLY (OUTPATIENT)
Dept: TRANSPLANT | Facility: CLINIC | Age: 71
End: 2021-06-22

## 2021-06-22 DIAGNOSIS — E11.9 TYPE 2 DIABETES MELLITUS WITHOUT COMPLICATION, UNSPECIFIED WHETHER LONG TERM INSULIN USE: ICD-10-CM

## 2021-06-22 DIAGNOSIS — E03.9 HYPOTHYROIDISM, UNSPECIFIED TYPE: Primary | ICD-10-CM

## 2021-06-22 LAB
EXT ALBUMIN: 3.7 (ref 3.4–5)
EXT ALKALINE PHOSPHATASE: 76 (ref 45–117)
EXT ALT: 37 (ref 13–56)
EXT ANC: ABNORMAL
EXT AST: 26 (ref 15–37)
EXT BANDS%: 4 (ref 2–6)
EXT BILIRUBIN DIRECT: <0.1 MG/DL (ref 0–0.2)
EXT BILIRUBIN TOTAL: 0.2 (ref 0.2–1)
EXT BK VIRUS DNA QN PCR: ABNORMAL
EXT BUN: 21 (ref 7–18)
EXT CALCIUM: 9.5 (ref 8.5–10.1)
EXT CHLORIDE: 110 (ref 98–107)
EXT CHOLESTEROL: 195
EXT CO2: 24 (ref 21–32)
EXT CREATININE: 0.82 MG/DL
EXT EOSINOPHIL%: 0
EXT GFR MDRD NON AF AMER: >60
EXT GLUCOSE UA: ABNORMAL
EXT GLUCOSE: 172 (ref 74–106)
EXT HBV DNA QUANT PCR: NOT DETECTED
EXT HCV QUANT: NOT DETECTED
EXT HDL: 72
EXT HEMATOCRIT: 44.7 (ref 37–47)
EXT HEMOGLOBIN: 13.7 (ref 12–16)
EXT LDL CHOLESTEROL: 95.6
EXT LYMPH%: 38 (ref 20–45)
EXT MAGNESIUM: 2.1 (ref 1.6–2.6)
EXT MONOCYTES%: 4 (ref 2–10)
EXT NITRITES UA: ABNORMAL
EXT PHOSPHORUS: 2.4 (ref 4.5–6.7)
EXT PLATELETS: 199 (ref 130–400)
EXT POTASSIUM: 4.3 (ref 3.5–5.1)
EXT PROT/CREAT RATIO UR: 0.12
EXT PROTEIN TOTAL: 6.5 (ref 6.4–8.2)
EXT PROTEIN UA: ABNORMAL
EXT RBC UA: ABNORMAL
EXT SEGS%: 50 (ref 50–80)
EXT SODIUM: 143 MMOL/L (ref 131–143)
EXT TACROLIMUS LVL: 8.3
EXT TRIGLYCERIDES: 137 (ref 0–149)
EXT WBC UA: ABNORMAL
EXT WBC: 9.7 (ref 4.5–10)

## 2021-06-22 RX ORDER — BLOOD-GLUCOSE SENSOR
EACH MISCELLANEOUS
Qty: 3 EACH | Refills: 11 | Status: SHIPPED | OUTPATIENT
Start: 2021-06-22

## 2021-06-22 RX ORDER — BLOOD-GLUCOSE TRANSMITTER
EACH MISCELLANEOUS
Qty: 1 DEVICE | Refills: 4 | Status: SHIPPED | OUTPATIENT
Start: 2021-06-22

## 2021-07-01 ENCOUNTER — DOCUMENTATION ONLY (OUTPATIENT)
Dept: TRANSPLANT | Facility: CLINIC | Age: 71
End: 2021-07-01

## 2021-07-01 LAB
EXT ALBUMIN: 3.7 (ref 3.4–5)
EXT ANC: ABNORMAL
EXT BANDS%: 10 (ref 2–6)
EXT BUN: 24 (ref 7–18)
EXT CALCIUM: 9.4 (ref 8.5–10.1)
EXT CHLORIDE: 112 (ref 98–107)
EXT CO2: 22 (ref 21–32)
EXT CREATININE: 0.82 MG/DL
EXT EOSINOPHIL%: 1 (ref 0–6)
EXT GFR MDRD NON AF AMER: >60
EXT GLUCOSE: 195 (ref 74–106)
EXT HCV AB: NONREACTIVE
EXT HCV QUANT: NOT DETECTED
EXT HEMATOCRIT: 43.3 (ref 37–47)
EXT HEMOGLOBIN: 13.7 (ref 12–16)
EXT LYMPH%: 25 (ref 20–45)
EXT MAGNESIUM: 1.9 (ref 1.6–2.6)
EXT MONOCYTES%: 5 (ref 2–10)
EXT PHOSPHORUS: 2.4 (ref 4.5–6.7)
EXT PLATELETS: 247 (ref 130–400)
EXT POTASSIUM: 4.2 (ref 3.5–5.1)
EXT SEGS%: 53 (ref 50–80)
EXT SODIUM: 140 MMOL/L (ref 131–143)
EXT TACROLIMUS LVL: 9.1
EXT WBC: 10.3 (ref 4.5–10)

## 2021-07-05 ENCOUNTER — PATIENT MESSAGE (OUTPATIENT)
Dept: ENDOCRINOLOGY | Facility: CLINIC | Age: 71
End: 2021-07-05

## 2021-07-06 DIAGNOSIS — E11.9 TYPE 2 DIABETES MELLITUS WITHOUT COMPLICATION, UNSPECIFIED WHETHER LONG TERM INSULIN USE: ICD-10-CM

## 2021-07-06 DIAGNOSIS — E11.9 TYPE 2 DIABETES MELLITUS WITHOUT COMPLICATION, UNSPECIFIED WHETHER LONG TERM INSULIN USE: Primary | ICD-10-CM

## 2021-07-06 RX ORDER — PEN NEEDLE, DIABETIC 30 GX3/16"
1 NEEDLE, DISPOSABLE MISCELLANEOUS 4 TIMES DAILY
Qty: 200 EACH | Refills: 10 | Status: SHIPPED | OUTPATIENT
Start: 2021-07-06 | End: 2021-07-06 | Stop reason: SDUPTHER

## 2021-07-06 RX ORDER — PEN NEEDLE, DIABETIC 30 GX3/16"
1 NEEDLE, DISPOSABLE MISCELLANEOUS 4 TIMES DAILY
Qty: 200 EACH | Refills: 10 | Status: SHIPPED | OUTPATIENT
Start: 2021-07-06

## 2021-07-13 ENCOUNTER — PATIENT MESSAGE (OUTPATIENT)
Dept: ENDOCRINOLOGY | Facility: CLINIC | Age: 71
End: 2021-07-13

## 2021-07-15 ENCOUNTER — PATIENT MESSAGE (OUTPATIENT)
Dept: ENDOCRINOLOGY | Facility: CLINIC | Age: 71
End: 2021-07-15

## 2021-07-15 DIAGNOSIS — E11.9 TYPE 2 DIABETES MELLITUS WITHOUT COMPLICATION, UNSPECIFIED WHETHER LONG TERM INSULIN USE: Primary | ICD-10-CM

## 2021-07-15 RX ORDER — BLOOD SUGAR DIAGNOSTIC
STRIP MISCELLANEOUS
Qty: 200 STRIP | Refills: 11 | Status: SHIPPED | OUTPATIENT
Start: 2021-07-15 | End: 2021-07-15 | Stop reason: SDUPTHER

## 2021-07-15 RX ORDER — BLOOD SUGAR DIAGNOSTIC
STRIP MISCELLANEOUS
Qty: 150 STRIP | Refills: 11 | Status: SHIPPED | OUTPATIENT
Start: 2021-07-15

## 2021-07-16 ENCOUNTER — DOCUMENTATION ONLY (OUTPATIENT)
Dept: TRANSPLANT | Facility: CLINIC | Age: 71
End: 2021-07-16

## 2021-07-16 LAB
EXT ALBUMIN: 3.9 (ref 3.4–5)
EXT ANC: ABNORMAL
EXT BUN: 24 (ref 7–18)
EXT CALCIUM: 9.8 (ref 8.5–10.1)
EXT CHLORIDE: 110 (ref 98–107)
EXT CO2: 22 (ref 21–32)
EXT CREATININE: 0.83 MG/DL
EXT EOSINOPHIL%: 2 (ref 2–10)
EXT GFR MDRD NON AF AMER: >60
EXT GLUCOSE: 233 (ref 74–106)
EXT HEMATOCRIT: 47.1 (ref 37–47)
EXT HEMOGLOBIN: 14.3 (ref 12–16)
EXT LYMPH%: 25 (ref 20–45)
EXT MAGNESIUM: 1.9 (ref 1.6–2.6)
EXT MONOCYTES%: 3 (ref 2–10)
EXT PHOSPHORUS: 2.8 (ref 4.5–6.7)
EXT PLATELETS: 239 (ref 130–400)
EXT POTASSIUM: 4.4 (ref 3.5–5.1)
EXT SEGS%: 68 (ref 50–80)
EXT SODIUM: 139 MMOL/L (ref 131–143)
EXT TACROLIMUS LVL: 8.2
EXT WBC: 13.1 (ref 4.5–10)

## 2021-07-19 ENCOUNTER — TELEPHONE (OUTPATIENT)
Dept: ENDOCRINOLOGY | Facility: CLINIC | Age: 71
End: 2021-07-19

## 2021-07-20 ENCOUNTER — TELEPHONE (OUTPATIENT)
Dept: ENDOCRINOLOGY | Facility: CLINIC | Age: 71
End: 2021-07-20

## 2021-07-21 ENCOUNTER — PATIENT MESSAGE (OUTPATIENT)
Dept: TRANSPLANT | Facility: CLINIC | Age: 71
End: 2021-07-21

## 2021-08-11 ENCOUNTER — PATIENT MESSAGE (OUTPATIENT)
Dept: ENDOCRINOLOGY | Facility: CLINIC | Age: 71
End: 2021-08-11

## 2021-08-12 DIAGNOSIS — E11.9 TYPE 2 DIABETES MELLITUS WITHOUT COMPLICATION, UNSPECIFIED WHETHER LONG TERM INSULIN USE: ICD-10-CM

## 2021-08-12 RX ORDER — INSULIN ASPART 100 [IU]/ML
12 INJECTION, SOLUTION INTRAVENOUS; SUBCUTANEOUS 3 TIMES DAILY
Qty: 15 ML | Refills: 11 | Status: SHIPPED | OUTPATIENT
Start: 2021-08-12

## 2021-08-13 ENCOUNTER — PATIENT MESSAGE (OUTPATIENT)
Dept: ENDOCRINOLOGY | Facility: CLINIC | Age: 71
End: 2021-08-13

## 2021-08-13 ENCOUNTER — OFFICE VISIT (OUTPATIENT)
Dept: ENDOCRINOLOGY | Facility: CLINIC | Age: 71
End: 2021-08-13
Payer: MEDICARE

## 2021-08-13 DIAGNOSIS — M85.80 OSTEOPENIA, UNSPECIFIED LOCATION: ICD-10-CM

## 2021-08-13 DIAGNOSIS — Z79.4 TYPE 2 DIABETES MELLITUS WITH DIABETIC NEPHROPATHY, WITH LONG-TERM CURRENT USE OF INSULIN: Chronic | ICD-10-CM

## 2021-08-13 DIAGNOSIS — E03.9 HYPOTHYROIDISM, UNSPECIFIED TYPE: ICD-10-CM

## 2021-08-13 DIAGNOSIS — E11.21 TYPE 2 DIABETES MELLITUS WITH DIABETIC NEPHROPATHY, WITH LONG-TERM CURRENT USE OF INSULIN: Chronic | ICD-10-CM

## 2021-08-13 DIAGNOSIS — Z94.0 S/P KIDNEY TRANSPLANT: ICD-10-CM

## 2021-08-13 DIAGNOSIS — E11.9 TYPE 2 DIABETES MELLITUS WITHOUT COMPLICATION, UNSPECIFIED WHETHER LONG TERM INSULIN USE: Primary | ICD-10-CM

## 2021-08-13 PROCEDURE — 99214 PR OFFICE/OUTPT VISIT, EST, LEVL IV, 30-39 MIN: ICD-10-PCS | Mod: 95,GC,, | Performed by: STUDENT IN AN ORGANIZED HEALTH CARE EDUCATION/TRAINING PROGRAM

## 2021-08-13 PROCEDURE — 99214 OFFICE O/P EST MOD 30 MIN: CPT | Mod: 95,GC,, | Performed by: STUDENT IN AN ORGANIZED HEALTH CARE EDUCATION/TRAINING PROGRAM

## 2021-08-17 ENCOUNTER — TELEPHONE (OUTPATIENT)
Dept: TRANSPLANT | Facility: CLINIC | Age: 71
End: 2021-08-17

## 2021-08-17 ENCOUNTER — DOCUMENTATION ONLY (OUTPATIENT)
Dept: TRANSPLANT | Facility: CLINIC | Age: 71
End: 2021-08-17

## 2021-08-17 LAB
EXT ALBUMIN: 4 (ref 3.4–5)
EXT ANC: ABNORMAL
EXT BUN: 21 (ref 7–18)
EXT CALCIUM: 9.6 (ref 8.5–10.1)
EXT CHLORIDE: 109 (ref 98–107)
EXT CO2: 26 (ref 21–32)
EXT CREATININE: 0.88 MG/DL
EXT EOSINOPHIL%: 1.6 (ref 0–6)
EXT GFR MDRD NON AF AMER: >60
EXT GLUCOSE: 187 (ref 74–106)
EXT HEMATOCRIT: 41.9 (ref 37–47)
EXT HEMOGLOBIN: 13.1 (ref 12–16)
EXT LYMPH%: 22.8 (ref 20–45)
EXT MAGNESIUM: 1.8 (ref 1.6–2.6)
EXT MONOCYTES%: 11 (ref 2–10)
EXT PHOSPHORUS: 2.7 (ref 8.5–10.1)
EXT PLATELETS: 284 (ref 130–400)
EXT POTASSIUM: 4.3 (ref 3.5–5.1)
EXT SEGS%: 61.6 (ref 50–80)
EXT SODIUM: 141 MMOL/L (ref 131–143)
EXT TACROLIMUS LVL: 7.7
EXT WBC: 11.1 (ref 4.5–10)

## 2021-08-23 ENCOUNTER — PATIENT MESSAGE (OUTPATIENT)
Dept: TRANSPLANT | Facility: CLINIC | Age: 71
End: 2021-08-23

## 2021-08-26 ENCOUNTER — IMMUNIZATION (OUTPATIENT)
Dept: HEMATOLOGY/ONCOLOGY | Facility: CLINIC | Age: 71
End: 2021-08-26
Payer: MEDICARE

## 2021-08-26 DIAGNOSIS — Z23 NEED FOR VACCINATION: Primary | ICD-10-CM

## 2021-08-26 PROCEDURE — 91301 COVID-19, MRNA, LNP-S, PF, 100 MCG/0.5 ML DOSE VACCINE: CPT | Mod: S$GLB,,, | Performed by: FAMILY MEDICINE

## 2021-08-26 PROCEDURE — 0013A COVID-19, MRNA, LNP-S, PF, 100 MCG/0.5 ML DOSE VACCINE: CPT | Mod: CV19,S$GLB,, | Performed by: FAMILY MEDICINE

## 2021-08-26 PROCEDURE — 91301 COVID-19, MRNA, LNP-S, PF, 100 MCG/0.5 ML DOSE VACCINE: ICD-10-PCS | Mod: S$GLB,,, | Performed by: FAMILY MEDICINE

## 2021-08-26 PROCEDURE — 0013A COVID-19, MRNA, LNP-S, PF, 100 MCG/0.5 ML DOSE VACCINE: ICD-10-PCS | Mod: CV19,S$GLB,, | Performed by: FAMILY MEDICINE

## 2021-09-08 ENCOUNTER — DOCUMENTATION ONLY (OUTPATIENT)
Dept: TRANSPLANT | Facility: CLINIC | Age: 71
End: 2021-09-08

## 2021-09-08 LAB
EXT ALBUMIN: 4.2
EXT ALKALINE PHOSPHATASE: 94
EXT ALT: 38
EXT ANC: ABNORMAL
EXT AST: 25
EXT BACTERIA UA: ABNORMAL
EXT BILIRUBIN DIRECT: 0.1 MG/DL
EXT BILIRUBIN TOTAL: 0.3
EXT BK VIRUS DNA QN PCR: ABNORMAL
EXT BUN: 23
EXT CALCIUM: 9.8
EXT CHLORIDE: 107
EXT CO2: 27
EXT CREATININE: 1 MG/DL
EXT EOSINOPHIL%: 1.4
EXT GFR MDRD NON AF AMER: 55
EXT GLUCOSE UA: NEGATIVE
EXT GLUCOSE: 161
EXT HEMATOCRIT: 44.3
EXT HEMOGLOBIN A1C: 6.4 %
EXT HEMOGLOBIN: 13.4
EXT LYMPH%: 27.4
EXT MAGNESIUM: 1.9
EXT MONOCYTES%: 11
EXT NITRITES UA: NEGATIVE
EXT PHOSPHORUS: 2.8
EXT PLATELETS: 287
EXT POTASSIUM: 4.2
EXT PROTEIN TOTAL: 7.2
EXT PROTEIN UA: NEGATIVE
EXT RBC UA: ABNORMAL
EXT SEGS%: 58.5
EXT SODIUM: 145 MMOL/L
EXT TACROLIMUS LVL: 8.8
EXT URINE CULTURE: ABNORMAL
EXT WBC UA: ABNORMAL
EXT WBC: 11.3

## 2021-09-10 ENCOUNTER — OFFICE VISIT (OUTPATIENT)
Dept: TRANSPLANT | Facility: CLINIC | Age: 71
End: 2021-09-10
Payer: MEDICARE

## 2021-09-10 ENCOUNTER — PATIENT MESSAGE (OUTPATIENT)
Dept: TRANSPLANT | Facility: CLINIC | Age: 71
End: 2021-09-10

## 2021-09-10 VITALS — SYSTOLIC BLOOD PRESSURE: 126 MMHG | DIASTOLIC BLOOD PRESSURE: 80 MMHG

## 2021-09-10 DIAGNOSIS — N05.2 MEMBRANOUS GLOMERULONEPHRITIS: ICD-10-CM

## 2021-09-10 DIAGNOSIS — I63.9 STROKE OF UNKNOWN ETIOLOGY: ICD-10-CM

## 2021-09-10 DIAGNOSIS — Z94.0 S/P KIDNEY TRANSPLANT: Primary | ICD-10-CM

## 2021-09-10 DIAGNOSIS — N18.2 CKD (CHRONIC KIDNEY DISEASE) STAGE 2, GFR 60-89 ML/MIN: ICD-10-CM

## 2021-09-10 DIAGNOSIS — I10 ESSENTIAL HYPERTENSION: Chronic | ICD-10-CM

## 2021-09-10 PROCEDURE — 3074F PR MOST RECENT SYSTOLIC BLOOD PRESSURE < 130 MM HG: ICD-10-PCS | Mod: CPTII,95,, | Performed by: INTERNAL MEDICINE

## 2021-09-10 PROCEDURE — 99215 OFFICE O/P EST HI 40 MIN: CPT | Mod: 95,,, | Performed by: INTERNAL MEDICINE

## 2021-09-10 PROCEDURE — 3079F PR MOST RECENT DIASTOLIC BLOOD PRESSURE 80-89 MM HG: ICD-10-PCS | Mod: CPTII,95,, | Performed by: INTERNAL MEDICINE

## 2021-09-10 PROCEDURE — 3066F PR DOCUMENTATION OF TREATMENT FOR NEPHROPATHY: ICD-10-PCS | Mod: CPTII,95,, | Performed by: INTERNAL MEDICINE

## 2021-09-10 PROCEDURE — 3074F SYST BP LT 130 MM HG: CPT | Mod: CPTII,95,, | Performed by: INTERNAL MEDICINE

## 2021-09-10 PROCEDURE — 99215 PR OFFICE/OUTPT VISIT, EST, LEVL V, 40-54 MIN: ICD-10-PCS | Mod: 95,,, | Performed by: INTERNAL MEDICINE

## 2021-09-10 PROCEDURE — 3044F PR MOST RECENT HEMOGLOBIN A1C LEVEL <7.0%: ICD-10-PCS | Mod: CPTII,95,, | Performed by: INTERNAL MEDICINE

## 2021-09-10 PROCEDURE — 3044F HG A1C LEVEL LT 7.0%: CPT | Mod: CPTII,95,, | Performed by: INTERNAL MEDICINE

## 2021-09-10 PROCEDURE — 3079F DIAST BP 80-89 MM HG: CPT | Mod: CPTII,95,, | Performed by: INTERNAL MEDICINE

## 2021-09-10 PROCEDURE — 3066F NEPHROPATHY DOC TX: CPT | Mod: CPTII,95,, | Performed by: INTERNAL MEDICINE

## 2021-09-21 DIAGNOSIS — E83.42 HYPOMAGNESEMIA: ICD-10-CM

## 2021-09-21 DIAGNOSIS — Z94.0 KIDNEY REPLACED BY TRANSPLANT: ICD-10-CM

## 2021-10-21 ENCOUNTER — TELEPHONE (OUTPATIENT)
Dept: TRANSPLANT | Facility: CLINIC | Age: 71
End: 2021-10-21

## 2021-10-21 DIAGNOSIS — Z94.0 KIDNEY REPLACED BY TRANSPLANT: ICD-10-CM

## 2021-10-21 RX ORDER — TACROLIMUS 1 MG/1
CAPSULE ORAL
Qty: 180 CAPSULE | Refills: 11 | Status: SHIPPED | OUTPATIENT
Start: 2021-10-21 | End: 2021-12-21 | Stop reason: DRUGHIGH

## 2021-10-26 ENCOUNTER — PATIENT MESSAGE (OUTPATIENT)
Dept: TRANSPLANT | Facility: CLINIC | Age: 71
End: 2021-10-26
Payer: MEDICARE

## 2021-10-27 ENCOUNTER — TELEPHONE (OUTPATIENT)
Dept: TRANSPLANT | Facility: CLINIC | Age: 71
End: 2021-10-27
Payer: MEDICARE

## 2021-10-27 RX ORDER — INSULIN GLARGINE 300 U/ML
20 INJECTION, SOLUTION SUBCUTANEOUS DAILY
COMMUNITY

## 2021-12-07 ENCOUNTER — PATIENT MESSAGE (OUTPATIENT)
Dept: TRANSPLANT | Facility: CLINIC | Age: 71
End: 2021-12-07
Payer: MEDICARE

## 2021-12-18 ENCOUNTER — PATIENT MESSAGE (OUTPATIENT)
Dept: TRANSPLANT | Facility: CLINIC | Age: 71
End: 2021-12-18
Payer: MEDICARE

## 2021-12-20 ENCOUNTER — DOCUMENTATION ONLY (OUTPATIENT)
Dept: TRANSPLANT | Facility: CLINIC | Age: 71
End: 2021-12-20
Payer: MEDICARE

## 2021-12-20 LAB
EXT ALBUMIN: 3.5 (ref 3.4–5)
EXT ALKALINE PHOSPHATASE: 95 (ref 45–117)
EXT ALT: 25 (ref 13–56)
EXT ANC: ABNORMAL
EXT AST: 8 (ref 15–37)
EXT BACTERIA UA: ABNORMAL
EXT BILIRUBIN DIRECT: <0.1 MG/DL
EXT BILIRUBIN TOTAL: 0.2 (ref 0.2–1)
EXT BK VIRUS DNA QN PCR: ABNORMAL
EXT BUN: 28 (ref 7–18)
EXT CALCIUM: 10.2 (ref 8.5–10.1)
EXT CHLORIDE: 109 (ref 98–107)
EXT CO2: 24 (ref 21–32)
EXT CREATININE: 0.93 MG/DL
EXT EOSINOPHIL%: 1.1 (ref 0–6)
EXT GFR MDRD NON AF AMER: 59
EXT GLUCOSE UA: 30
EXT GLUCOSE: 175 (ref 74–106)
EXT HEMATOCRIT: 43.1 (ref 37–47)
EXT HEMOGLOBIN: 13.8 (ref 12–16)
EXT LYMPH%: 30.2 (ref 20–45)
EXT MAGNESIUM: 2 (ref 1.6–2.6)
EXT MONOCYTES%: 11.2 (ref 2–10)
EXT NITRITES UA: ABNORMAL
EXT PHOSPHORUS: 2.5 (ref 4.5–6.7)
EXT PLATELETS: 258 (ref 130–400)
EXT POTASSIUM: 4.2 (ref 3.5–5.1)
EXT PROT/CREAT RATIO UR: 0.5
EXT PROTEIN TOTAL: 7.1 (ref 6.4–8.2)
EXT PROTEIN UA: ABNORMAL
EXT RBC UA: ABNORMAL
EXT SEGS%: 55.6 (ref 50–80)
EXT SODIUM: 137 MMOL/L (ref 131–143)
EXT TACROLIMUS LVL: 12.9
EXT WBC UA: ABNORMAL
EXT WBC: 11 (ref 4.5–10)

## 2021-12-21 ENCOUNTER — PATIENT MESSAGE (OUTPATIENT)
Dept: TRANSPLANT | Facility: CLINIC | Age: 71
End: 2021-12-21
Payer: MEDICARE

## 2021-12-21 DIAGNOSIS — Z94.0 KIDNEY REPLACED BY TRANSPLANT: ICD-10-CM

## 2021-12-21 NOTE — TELEPHONE ENCOUNTER
Notified patient of Dr. Torres review of 12/14/21 lab results via [facility].    Hung Ms. Benton,     Dr. Torres reviewed your 12/14/21 lab results. Your Prograf level is elevated=12.9. He wants you to please decrease your Prograf/tacrolimus dose to 2mg twice daily: we'll need to repeat your level next week (12/29/21).     He also wants to make sure you're drinking enough water, at least 3Liters daily. Please follow-up with your local nephrologist.     Thanks,     Era     ----- Message from Dino Torers MD sent at 12/20/2021  9:04 PM CST -----  Please lower prograf to 2 mg PO bid, repeat labs next week, encourage hydration  Follow up with her nephrologist

## 2021-12-22 RX ORDER — TACROLIMUS 1 MG/1
2 CAPSULE ORAL EVERY 12 HOURS
Qty: 120 CAPSULE | Refills: 11 | Status: SHIPPED | OUTPATIENT
Start: 2021-12-22 | End: 2022-03-10

## 2022-01-03 ENCOUNTER — DOCUMENTATION ONLY (OUTPATIENT)
Dept: TRANSPLANT | Facility: CLINIC | Age: 72
End: 2022-01-03
Payer: MEDICARE

## 2022-01-03 LAB — EXT TACROLIMUS LVL: 6.7

## 2022-01-04 ENCOUNTER — TELEPHONE (OUTPATIENT)
Dept: TRANSPLANT | Facility: CLINIC | Age: 72
End: 2022-01-04
Payer: MEDICARE

## 2022-01-04 ENCOUNTER — PATIENT MESSAGE (OUTPATIENT)
Dept: TRANSPLANT | Facility: CLINIC | Age: 72
End: 2022-01-04
Payer: MEDICARE

## 2022-01-04 NOTE — TELEPHONE ENCOUNTER
Notified patient of Dr. Torres review of 12/29/21 lab results via [facility].     Hung Kirklandon,     Dr. Torres reviewed your 12/29/21 Prograf level. The level is within therapeutic range, so please continue taking Prograf 2mg every 12 hours as prescribed.     Thanks,     Era

## 2022-01-05 ENCOUNTER — PATIENT MESSAGE (OUTPATIENT)
Dept: TRANSPLANT | Facility: CLINIC | Age: 72
End: 2022-01-05
Payer: MEDICARE

## 2022-01-11 ENCOUNTER — PATIENT MESSAGE (OUTPATIENT)
Dept: TRANSPLANT | Facility: CLINIC | Age: 72
End: 2022-01-11
Payer: MEDICARE

## 2022-01-11 ENCOUNTER — OFFICE VISIT (OUTPATIENT)
Dept: TRANSPLANT | Facility: CLINIC | Age: 72
End: 2022-01-11
Payer: MEDICARE

## 2022-01-11 DIAGNOSIS — N18.2 CKD (CHRONIC KIDNEY DISEASE) STAGE 2, GFR 60-89 ML/MIN: ICD-10-CM

## 2022-01-11 DIAGNOSIS — I10 ESSENTIAL HYPERTENSION: Chronic | ICD-10-CM

## 2022-01-11 DIAGNOSIS — Z79.01 CURRENT USE OF LONG TERM ANTICOAGULATION: Chronic | ICD-10-CM

## 2022-01-11 DIAGNOSIS — N05.2 MEMBRANOUS GLOMERULONEPHRITIS: ICD-10-CM

## 2022-01-11 DIAGNOSIS — Z79.60 LONG-TERM USE OF IMMUNOSUPPRESSANT MEDICATION: ICD-10-CM

## 2022-01-11 DIAGNOSIS — Z94.0 S/P KIDNEY TRANSPLANT: ICD-10-CM

## 2022-01-11 DIAGNOSIS — Z29.89 PROPHYLACTIC IMMUNOTHERAPY: ICD-10-CM

## 2022-01-11 DIAGNOSIS — E11.21 TYPE 2 DIABETES MELLITUS WITH DIABETIC NEPHROPATHY, UNSPECIFIED WHETHER LONG TERM INSULIN USE: Chronic | ICD-10-CM

## 2022-01-11 PROCEDURE — 99214 OFFICE O/P EST MOD 30 MIN: CPT | Mod: 95,,, | Performed by: NURSE PRACTITIONER

## 2022-01-11 PROCEDURE — 99214 PR OFFICE/OUTPT VISIT, EST, LEVL IV, 30-39 MIN: ICD-10-PCS | Mod: 95,,, | Performed by: NURSE PRACTITIONER

## 2022-01-11 PROCEDURE — 1160F RVW MEDS BY RX/DR IN RCRD: CPT | Mod: CPTII,95,, | Performed by: NURSE PRACTITIONER

## 2022-01-11 PROCEDURE — 1159F PR MEDICATION LIST DOCUMENTED IN MEDICAL RECORD: ICD-10-PCS | Mod: CPTII,95,, | Performed by: NURSE PRACTITIONER

## 2022-01-11 PROCEDURE — 1160F PR REVIEW ALL MEDS BY PRESCRIBER/CLIN PHARMACIST DOCUMENTED: ICD-10-PCS | Mod: CPTII,95,, | Performed by: NURSE PRACTITIONER

## 2022-01-11 PROCEDURE — 1159F MED LIST DOCD IN RCRD: CPT | Mod: CPTII,95,, | Performed by: NURSE PRACTITIONER

## 2022-01-11 RX ORDER — LANOLIN ALCOHOL/MO/W.PET/CERES
800 CREAM (GRAM) TOPICAL 3 TIMES DAILY
Qty: 180 TABLET | Refills: 11 | Status: SHIPPED | OUTPATIENT
Start: 2022-01-11 | End: 2022-09-20

## 2022-01-11 NOTE — PROGRESS NOTES
"   Kidney Post-Transplant Assessment    Referring Physician: Al Neves  Current Nephrologist: Al Neves    ORGAN: LEFT KIDNEY  Donor Type: donation after circulatory death   PHS Increased Risk: yes  Cold Ischemia: 862 mins  Induction Medications: simulect - basiliximab    Subjective:     CC:  Reassessment of renal allograft function and management of chronic immunosuppression.    HPI:  Ms. Benton is a 71 y.o. 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 Her most recent creatinine is 0.93. She takes mycophenolate mofetil, prednisone and tacrolimus for maintenance immunosuppression. Her post transplant course has been uncomplicated to date.  IMMUNOSUPPRESSION: Tacrolimus/Mycophenolate/Prednisone   PCP PROPHYLAXIS: Bactrim until 9/21/21   CMV PROPHYLAXIS: Valcyte until 6/23/21   FUNGAL PROPHYLAXIS: Nystatin until 5/25/21     Interval HX:  Over all doing well. Report BS are "up and down" but is doing well managing .  She  f/u with endocrinology   Hemoglobin A1c/Hemoglobin, total, Blood % 6.5    She remains on Keppra and is scheduled to see neurology  2/2022 to discuss getting off the med    Intake 2L +  UOP  No problems reported   BP     BP Readings from Last 3 Encounters:   09/10/21 126/80   06/18/21 126/75   05/06/21 130/63     Peripheral edema-no   Weight stable       Lab /diagnostic results reviewed with patient today.   All questions answered      Past Medical History:   Diagnosis Date    Anemia of renal disease     ESRD on peritoneal dialysis     Essential hypertension     Hyperlipidemia     Hypothyroidism     Stroke x2 in 2014 and 2015     Type 2 diabetes mellitus with diabetic nephropathy     Vitamin D deficiency        Review of Systems   Constitutional: Negative for activity change, appetite change, chills, fatigue, fever and unexpected weight change. "   HENT: Negative for congestion, facial swelling, postnasal drip, rhinorrhea, sinus pressure, sore throat and trouble swallowing.    Eyes: Positive for visual disturbance. Negative for pain and redness.        Glasses   Respiratory: Negative for cough, chest tightness, shortness of breath and wheezing.    Cardiovascular: Negative for chest pain, palpitations and leg swelling.   Gastrointestinal: Negative for abdominal pain, diarrhea, nausea and vomiting.   Genitourinary: Negative for dysuria, flank pain and urgency.   Musculoskeletal: Negative for gait problem, neck pain and neck stiffness.   Skin: Negative for rash and wound.   Allergic/Immunologic: Positive for immunocompromised state. Negative for environmental allergies and food allergies.   Neurological: Negative for dizziness, weakness, light-headedness and headaches.   Psychiatric/Behavioral: Negative for agitation and confusion. The patient is not nervous/anxious.        Objective:   There were no vitals taken for this visit.body mass index is unknown because there is no height or weight on file.    Physical Exam    Labs:  Lab Results   Component Value Date    WBC 13.29 (H) 04/15/2021    HGB 9.8 (L) 04/15/2021    HCT 32.5 (L) 04/15/2021     04/15/2021    K 4.5 04/15/2021     (H) 04/15/2021    CO2 22 (L) 04/15/2021    BUN 20 04/15/2021    CREATININE 1.1 04/15/2021    EGFRNONAA 50.3 (A) 04/15/2021    CALCIUM 9.3 04/15/2021    PHOS 1.6 (L) 04/15/2021    MG 1.7 04/15/2021    ALBUMIN 3.7 04/15/2021    AST 20 04/12/2021    ALT 30 04/12/2021    UTPCR 0.23 (H) 04/05/2021    .0 (H) 03/25/2021    TACROLIMUS 9.6 04/15/2021       Lab Results   Component Value Date    EXTANC  12/14/2021      Comment:      KIDNEY; 9 month protocol labs; RTC appt 1/11/22    EXTWBC 11.0 (A) 12/14/2021    EXTSEGS 55.6 12/14/2021    EXTPLATELETS 258 12/14/2021    EXTHEMOGLOBI 13.8 12/14/2021    EXTHEMATOCRI 43.1 12/14/2021    EXTCREATININ 0.93 12/14/2021    EXTSODIUM 137  12/14/2021    EXTPOTASSIUM 4.2 12/14/2021    EXTBUN 28 (A) 12/14/2021    EXTCO2 24 12/14/2021    EXTCALCIUM 10.2 (A) 12/14/2021    EXTPHOSPHORU 2.5 (A) 12/14/2021    EXTGLUCOSE 175 (A) 12/14/2021    EXTALBUMIN 3.5 12/14/2021    EXTAST 8 (A) 12/14/2021    EXTALT 25 12/14/2021    EXTBILITOTAL 0.2 12/14/2021       Lab Results   Component Value Date    EXTTACROLVL 6.7 12/29/2021    EXTPROTCRE 0.50 12/14/2021    EXTPROTEINUA NEG 12/14/2021    EXTWBCUA NONE 12/14/2021    EXTRBCUA NONE 12/14/2021       Labs were reviewed with the patient    Assessment:     1. S/P kidney transplant    2. Long-term use of immunosuppressant medication    3. Prophylactic immunotherapy    4. Essential hypertension    5. Type 2 diabetes mellitus with diabetic nephropathy, unspecified whether long term insulin use    6. Current use of long term anticoagulation--Plavix    7. Membranous glomerulonephritis    8. CKD (chronic kidney disease) stage 2, GFR 60-89 ml/min        Plan:      Follow-up:   1. CKD stage: 2   2. Immunosuppression:   Prograf trough 6.7 , Continue Prograf  2/2, [id number] Mg BID, and Prednisone  Taper as prescribed, Bactrim 400/80 mg QD for PCP prophylaxis.     Will continue to monitor for drug toxicities    3. Allograft Function: Stable. Continue good po hydration.   12/14/21      DM: continue insulin/ med regime as prescribed . Mgmt deferred to endocrinology  Lab Results   Component Value Date    HGBA1C 6.4 (H) 09/07/2021       4. Hypertension management: advise low salt diet and home BP monitoring     Nifedipine        5. Metabolic Bone Disease/Secondary Hyperparathyroidism:stable  Will monitor PTH, CA and Vit D/guidelines,    Mg ox   And  KPN     encouraged to separate Mg an KPN dosing to maximize absorption  12/14/21         6. Electrolytes:  Will monitor /guidelines   12/14/21           7. Anemia: stable. No need for intervention    Will monitor /guidelines   12/14/21        8.  Cytopenias: no significant cytopenias will  monitor as per our guidelines. Medicine list reviewed including potential causes of drug-induced cytopenias    9.Proteinuria: continue p/c ratio as per guidelines            12/14/2021  8mo 2wk   EXT PROT/CREAT Ratio UR 0.50  Urine protein=8 ; Urine zarhx=078     10. BK virus infection screening:  will continue to monitor/ guidelines  12/14/21      11. Weight education: provided during the clinic visit   There is no height or weight on file to calculate BMI.          12.Patient safety education regarding immunosuppression including prophylaxis posttransplant for CMV, PCP : Education provided about vaccination and prevention of infections            Follow-up:   Clinic: return to transplant clinic weekly for the first month after transplant; every 2 weeks during months 2-3; then at 6-, 9-, 12-, 18-, 24-, and 36- months post-transplant to reassess for complications from immunosuppression toxicity and monitor for rejection.  Annually thereafter.    Labs: since patient remains at high risk for rejection and drug-related complications that warrant close monitoring, labs will be ordered as follows: continue twice weekly CBC, renal panel, and drug level for first month; then same labs once weekly through 3rd month post-transplant.  Urine for UA and protein/creatinine ratio monthly.  Serum BK - PCR at 1-, 3-, 6-, 9-, 12-, 18-, 24-, 36-, 48-, and 60 months post-transplant.  Hepatic panel at 1-, 2-, 3-, 6-, 9-, 12-, 18-, 24-, and 36- months post-transplant.    Briana Acevedo NP       Education:   Material provided to the patient.  Patient reminded to call with any health changes since these can be early signs of significant complications.  Also, I advised the patient to be sure any new medications or changes of old medications are discussed with either a pharmacist or physician knowledgeable with transplant to avoid rejection/drug toxicity related to significant drug interactions.

## 2022-01-13 ENCOUNTER — DOCUMENTATION ONLY (OUTPATIENT)
Dept: TRANSPLANT | Facility: CLINIC | Age: 72
End: 2022-01-13
Payer: MEDICARE

## 2022-01-13 LAB
EXT ALBUMIN: 3.9 (ref 3.4–5)
EXT ANC: ABNORMAL
EXT BUN: 22 (ref 7–18)
EXT CALCIUM: 10.1 (ref 8.5–10.1)
EXT CHLORIDE: 103 (ref 98–107)
EXT CO2: 28 (ref 21–32)
EXT CREATININE: 0.93 MG/DL
EXT EOSINOPHIL%: 0.8 (ref 0–6)
EXT GFR MDRD NON AF AMER: 59
EXT GLUCOSE: 169 (ref 74–106)
EXT HEMATOCRIT: 44 (ref 37–47)
EXT HEMOGLOBIN: 13.9 (ref 12–16)
EXT LYMPH%: 29.5 (ref 20–45)
EXT MAGNESIUM: 1.9 (ref 1.6–2.6)
EXT MONOCYTES%: 11.4 (ref 2–10)
EXT PHOSPHORUS: 2.9 (ref 4.5–6.7)
EXT PLATELETS: 316 (ref 130–400)
EXT POTASSIUM: 3.9 (ref 3.5–5.1)
EXT SEGS%: 55.5 (ref 50–80)
EXT SODIUM: 139 MMOL/L (ref 131–143)
EXT TACROLIMUS LVL: 8.9
EXT WBC: 12.4 (ref 4.5–10)

## 2022-02-04 ENCOUNTER — DOCUMENTATION ONLY (OUTPATIENT)
Dept: TRANSPLANT | Facility: CLINIC | Age: 72
End: 2022-02-04
Payer: MEDICARE

## 2022-02-04 LAB
EXT ALBUMIN: 3.5 (ref 3.4–5)
EXT ANC: ABNORMAL
EXT BUN: 22 (ref 7–18)
EXT CALCIUM: 9.7 (ref 8.5–10.1)
EXT CHLORIDE: 108 (ref 98–107)
EXT CO2: 22 (ref 21–32)
EXT CREATININE: 0.8 MG/DL
EXT EOSINOPHIL%: 1.2 (ref 0–6)
EXT GFR MDRD NON AF AMER: >60
EXT GLUCOSE: 168 (ref 74–106)
EXT HEMATOCRIT: 43.3 (ref 37–47)
EXT HEMOGLOBIN: 14.1 (ref 12–16)
EXT LYMPH%: 30.7 (ref 20–45)
EXT MAGNESIUM: 1.9 (ref 1.6–2.6)
EXT MONOCYTES%: 11.6 (ref 2–10)
EXT PHOSPHORUS: 2.7 (ref 4.5–6.7)
EXT PLATELETS: 237 (ref 130–400)
EXT POTASSIUM: 4 (ref 3.5–5.1)
EXT SEGS%: 53.5 (ref 50–80)
EXT SODIUM: 138 MMOL/L (ref 131–143)
EXT TACROLIMUS LVL: 5.6
EXT WBC: 11.2 (ref 4.5–10)

## 2022-02-11 ENCOUNTER — TELEPHONE (OUTPATIENT)
Dept: ENDOCRINOLOGY | Facility: CLINIC | Age: 72
End: 2022-02-11
Payer: MEDICARE

## 2022-02-15 ENCOUNTER — TELEPHONE (OUTPATIENT)
Dept: TRANSPLANT | Facility: CLINIC | Age: 72
End: 2022-02-15
Payer: MEDICARE

## 2022-02-15 ENCOUNTER — PATIENT MESSAGE (OUTPATIENT)
Dept: TRANSPLANT | Facility: CLINIC | Age: 72
End: 2022-02-15
Payer: MEDICARE

## 2022-02-15 NOTE — TELEPHONE ENCOUNTER
Notified patient of Dr. Julien's review of 2/1/22 lab results via [facility].     Hung Ms. Benton,     Dr. Julien reviewed your 2/1/22 lab results. Your Prograf level was a little lower than it usually runs. She wants you to please repeat the Prograf level to verify your level. Let me know if you can repeat the level one day this week (maybe this Thurs 2/17/22) so I can fax the order to the lab.      Thanks,     Era         ----- Message from Era Broderick RN sent at 2/4/2022  5:21 PM CST -----    ----- Message -----  From: Jojo Julien MD  Sent: 2/4/2022  12:46 PM CST  To: [facility] Post-Kidney Transplant Clinical    Check tac level in a week. It was lower than target for the fist time.

## 2022-03-02 ENCOUNTER — DOCUMENTATION ONLY (OUTPATIENT)
Dept: TRANSPLANT | Facility: CLINIC | Age: 72
End: 2022-03-02
Payer: MEDICARE

## 2022-03-02 LAB
EXT ALBUMIN: 3.5 (ref 3.4–5)
EXT ALKALINE PHOSPHATASE: ABNORMAL
EXT ALLOSURE: ABNORMAL
EXT ALT: ABNORMAL
EXT AMYLASE: ABNORMAL
EXT ANC: ABNORMAL
EXT AST: ABNORMAL
EXT BACTERIA UA: ABNORMAL
EXT BANDS%: ABNORMAL
EXT BILIRUBIN DIRECT: ABNORMAL
EXT BILIRUBIN TOTAL: ABNORMAL
EXT BK VIRUS DNA QN PCR: ABNORMAL
EXT BK VIRUS DNA QUANT, PCR, URINE: ABNORMAL
EXT BUN: 24 (ref 7–18)
EXT C PEPTIDE: ABNORMAL
EXT CALCIUM: 10 (ref 8.5–10.1)
EXT CHLORIDE: 109 (ref 98–107)
EXT CHOLESTEROL (LIPID PANEL): ABNORMAL
EXT CHOLESTEROL: ABNORMAL
EXT CMV DNA QUANT. BY PCR: ABNORMAL
EXT CO2: 24 (ref 21–32)
EXT CREATININE: 0.77 MG/DL
EXT CYCLOSPORINE LVL: ABNORMAL
EXT EBV DNA BY PCR: ABNORMAL
EXT EBV DNA-COPIES/ML: ABNORMAL
EXT EOSINOPHIL%: 1.2
EXT FERRITIN: ABNORMAL
EXT GFR MDRD AF AMER: ABNORMAL
EXT GFR MDRD NON AF AMER: >60
EXT GLUCOSE UA: ABNORMAL
EXT GLUCOSE: 182 (ref 74–106)
EXT HBV DNA QUANT PCR: ABNORMAL
EXT HCV QUANT: ABNORMAL
EXT HDL: ABNORMAL
EXT HEMATOCRIT: 42.4 (ref 37–47)
EXT HEMOGLOBIN A1C: ABNORMAL
EXT HEMOGLOBIN: 13.8 (ref 12–16)
EXT HEP B S AG: ABNORMAL
EXT HIV RNA QUANT PCR: ABNORMAL
EXT HIV: ABNORMAL
EXT IMMUNKNOW (STIMULATED): ABNORMAL
EXT INR: ABNORMAL
EXT IRON SATURATION: ABNORMAL
EXT LDH, TOTAL: ABNORMAL
EXT LDL CHOLESTEROL: ABNORMAL
EXT LIPASE: ABNORMAL
EXT LYMPH%: 33.5
EXT MAGNESIUM: 1.9 (ref 1.6–2.6)
EXT MONOCYTES%: 10.3
EXT NITRITES UA: ABNORMAL
EXT PHOSPHORUS: 2.7 (ref 4.5–6.7)
EXT PLATELETS: 262 (ref 130–400)
EXT POTASSIUM: 3.9 (ref 3.5–5.1)
EXT PROT/CREAT RATIO UR: 0.09
EXT PROTEIN TOTAL: ABNORMAL
EXT PROTEIN UA: ABNORMAL
EXT PT: ABNORMAL
EXT PTH, INTACT: 106 (ref 18.5–88)
EXT RBC UA: ABNORMAL
EXT SARS COV-2 (COVID-19): ABNORMAL
EXT SEGS%: 54.3
EXT SERUM IRON: ABNORMAL
EXT SIROLIMUS LVL: ABNORMAL
EXT SODIUM: 139 MMOL/L (ref 131–143)
EXT STOOL CDIFF: ABNORMAL
EXT STOOL CMV: ABNORMAL
EXT STOOL CULTURE: ABNORMAL
EXT STOOL OCP: ABNORMAL
EXT TACROLIMUS LVL: 8.7
EXT TIBC: ABNORMAL
EXT TRIGLYCERIDES: ABNORMAL
EXT UNSATURATED IRON BINDING CAP.: ABNORMAL
EXT URIC ACID: ABNORMAL
EXT URINE CULTURE: ABNORMAL
EXT VIT D 25 HYDROXY: 19
EXT WBC UA: ABNORMAL
EXT WBC: 10

## 2022-03-03 DIAGNOSIS — E83.42 HYPOMAGNESEMIA: ICD-10-CM

## 2022-03-03 DIAGNOSIS — Z94.0 KIDNEY REPLACED BY TRANSPLANT: ICD-10-CM

## 2022-03-08 NOTE — PROGRESS NOTES
Kidney Post-Transplant Assessment    Referring Physician: Al Neves  Current Nephrologist: Al Neves    ORGAN: LEFT KIDNEY  Donor Type: donation after circulatory death   PHS Increased Risk: yes  Cold Ischemia: 862 mins  Induction Medications: simulect - basiliximab    Subjective:   The patient location is:  Kidney Post-Transplant Assessment  The chief complaint leading to consultation is: [place]       Visit type: audiovisual    Face to Face time with patient:  25 minutes of total time spent on the encounter, which includes face to face time and non-face to face time preparing to see the patient (eg, review of tests), Obtaining and/or reviewing separately obtained history, Documenting clinical information in the electronic or other health record, Independently interpreting results (not separately reported) and communicating results to the patient/family/caregiver, or Care coordination (not separately reported).         Each patient to whom he or she provides medical services by telemedicine is:  (1) informed of the relationship between the physician and patient and the respective role of any other health care provider with respect to management of the patient; and (2) notified that he or she may decline to receive medical services by telemedicine and may withdraw from such care at any time.    Notes:     CC:  Reassessment of renal allograft function and management of chronic immunosuppression.    HPI:  Ms. Benton is a 72 y.o. 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 Her   baseline creatinine is 0.8-1.1 She takes mycophenolate mofetil, prednisone and tacrolimus for maintenance immunosuppression.  No hospitalizations since last visit.   IMMUNOSUPPRESSION: Tacrolimus/Mycophenolate/Prednisone   PCP PROPHYLAXIS: Bactrim until 9/21/21   CMV PROPHYLAXIS: Valcyte  until 6/23/21   FUNGAL PROPHYLAXIS: Nystatin until 5/25/21     Interval HX:  Over all doing well.  Is f/u with general nephrology  DM--> f/u with endocrinology  Dr Pedroza in [place]   Reports last A1C 6.7, using Dexcom  Lab Results   Component Value Date    HGBA1C 6.4 (H) 09/07/2021      Intake 2L +  UOP  No problems reported   BP     BP Readings from Last 3 Encounters:   09/10/21 126/80   06/18/21 126/75   05/06/21 130/63     Peripheral edema-no   Weight stable       Is feeling stronger and is getting more active.      Lab /diagnostic results reviewed with patient today.   All questions answered      Past Medical History:   Diagnosis Date    Anemia of renal disease     ESRD on peritoneal dialysis     Essential hypertension     Hyperlipidemia     Hypothyroidism     Stroke x2 in 2014 and 2015     Type 2 diabetes mellitus with diabetic nephropathy     Vitamin D deficiency        Review of Systems   Constitutional: Negative for activity change, appetite change, chills, fatigue, fever and unexpected weight change.   HENT: Negative for congestion, facial swelling, postnasal drip, rhinorrhea, sinus pressure, sore throat and trouble swallowing.    Eyes: Positive for visual disturbance. Negative for pain and redness.        Glasses   Respiratory: Negative for cough, chest tightness, shortness of breath and wheezing.    Cardiovascular: Negative for chest pain, palpitations and leg swelling.   Gastrointestinal: Negative for abdominal pain, diarrhea, nausea and vomiting.   Genitourinary: Negative for dysuria, flank pain and urgency.   Musculoskeletal: Negative for gait problem, neck pain and neck stiffness.   Skin: Negative for rash and wound.   Allergic/Immunologic: Positive for immunocompromised state. Negative for environmental allergies and food allergies.   Neurological: Negative for dizziness, weakness, light-headedness and headaches.   Psychiatric/Behavioral: Negative for agitation and confusion. The  patient is not nervous/anxious.        Objective:   There were no vitals taken for this visit.body mass index is unknown because there is no height or weight on file.    Physical Exam    Labs:  Lab Results   Component Value Date    WBC 13.29 (H) 04/15/2021    HGB 9.8 (L) 04/15/2021    HCT 32.5 (L) 04/15/2021     04/15/2021    K 4.5 04/15/2021     (H) 04/15/2021    CO2 22 (L) 04/15/2021    BUN 20 04/15/2021    CREATININE 1.1 04/15/2021    EGFRNONAA 50.3 (A) 04/15/2021    CALCIUM 9.3 04/15/2021    PHOS 1.6 (L) 04/15/2021    MG 1.7 04/15/2021    ALBUMIN 3.7 04/15/2021    AST 20 04/12/2021    ALT 30 04/12/2021    UTPCR 0.23 (H) 04/05/2021    .0 (H) 03/25/2021    TACROLIMUS 9.6 04/15/2021       Lab Results   Component Value Date    EXTANC  03/01/2022      Comment:      KIDNEY; 12 month protocol labs; RTC appt 3/10/22    EXTWBC 10 03/01/2022    EXTSEGS 54.3 03/01/2022    EXTPLATELETS 262 03/01/2022    EXTHEMOGLOBI 13.8 03/01/2022    EXTHEMATOCRI 42.4 03/01/2022    EXTCREATININ 0.772 03/01/2022    EXTSODIUM 139 03/01/2022    EXTPOTASSIUM 3.9 03/01/2022    EXTBUN 24 (A) 03/01/2022    EXTCO2 24 03/01/2022    EXTCALCIUM 10 03/01/2022    EXTPHOSPHORU 2.7 (A) 03/01/2022    EXTGLUCOSE 182 (A) 03/01/2022    EXTALBUMIN 3.5 03/01/2022    EXTAST 8 (A) 12/14/2021    EXTALT 25 12/14/2021    EXTBILITOTAL 0.2 12/14/2021       Lab Results   Component Value Date    EXTTACROLVL PENDING 03/01/2022    EXTPROTCRE 0.09 03/01/2022    EXTPTHINTACT 106 (A) 03/01/2022    EXTPROTEINUA NEG 03/01/2022    EXTWBCUA NONE 03/01/2022    EXTRBCUA NONE 03/01/2022       Labs were reviewed with the patient    Assessment:     1. S/P kidney transplant    2. Long-term use of immunosuppressant medication    3. Type 2 diabetes mellitus with diabetic nephropathy, with long-term current use of insulin    4. Essential hypertension    5. CKD (chronic kidney disease) stage 2, GFR 60-89 ml/min    6. Anemia of renal disease    7. Vitamin D deficiency     8. Membranous glomerulonephritis    9. Current use of long term anticoagulation--Plavix        Plan:   IS med adjustment:  lower prograf 2/1  Repeat trough ~ 2-3 weeks  Target 4-7      Follow-up:   1. CKD stage: 2   2. Immunosuppression:   Prograf trough 8.7 , supra therapeutic, target 4-7.Lower  Prograf  2/1, [id number] Mg BID, and Prednisone  5 mg QD prescribed,  .     Will continue to monitor for drug toxicities    3. Allograft Function: Stable. Continue good po hydration.        3/1/2022  11mo 0wk   EXT GFR MDRD NON AF AMER >60 (P)      3/1/2022  11mo 0wk   EXT Creatinine mg/dL 0.772 (P)       DM: continue insulin/ med regime as prescribed . Mgmt deferred to endocrinology  Lab Results   Component Value Date    HGBA1C 6.4 (H) 09/07/2021       4. Hypertension management: advise low salt diet and home BP monitoring     Nifedipine        5. Metabolic Bone Disease/Secondary Hyperparathyroidism:stable  Will monitor PTH, CA and Vit D/guidelines,    Mg ox   And  KPN     encouraged to separate Mg an KPN dosing to maximize absorption  3/1/2022        6. Electrolytes:  Will monitor /guidelines    3/1/2022        7. Anemia: stable. No need for intervention    Will monitor /guidelines    3/1/2022      8.  Cytopenias: no significant cytopenias will monitor as per our guidelines. Medicine list reviewed including potential causes of drug-induced cytopenias    9.Proteinuria: continue p/c ratio as per guidelines          3/1/2022  11mo 0wk   EXT PROT/CREAT Ratio UR 0.09 (P)  Urine protein=14;Urine xbqms=028           10. BK virus infection screening:  will continue to monitor/ guidelines       3/1/2022  11mo 0wk   EXT BK Virus DNA QN PCR PENDING (P)     11. Weight education: provided during the clinic visit   There is no height or weight on file to calculate BMI.          12.Patient safety education regarding immunosuppression including prophylaxis posttransplant for CMV, PCP : Education provided about vaccination and prevention  of infections            Follow-up:   Clinic: return to transplant clinic weekly for the first month after transplant; every 2 weeks during months 2-3; then at 6-, 9-, 12-, 18-, 24-, and 36- months post-transplant to reassess for complications from immunosuppression toxicity and monitor for rejection.  Annually thereafter.    Labs: since patient remains at high risk for rejection and drug-related complications that warrant close monitoring, labs will be ordered as follows: continue twice weekly CBC, renal panel, and drug level for first month; then same labs once weekly through 3rd month post-transplant.  Urine for UA and protein/creatinine ratio monthly.  Serum BK - PCR at 1-, 3-, 6-, 9-, 12-, 18-, 24-, 36-, 48-, and 60 months post-transplant.  Hepatic panel at 1-, 2-, 3-, 6-, 9-, 12-, 18-, 24-, and 36- months post-transplant.    Briana Acevedo NP       Education:   Material provided to the patient.  Patient reminded to call with any health changes since these can be early signs of significant complications.  Also, I advised the patient to be sure any new medications or changes of old medications are discussed with either a pharmacist or physician knowledgeable with transplant to avoid rejection/drug toxicity related to significant drug interactions.

## 2022-03-09 ENCOUNTER — PATIENT MESSAGE (OUTPATIENT)
Dept: TRANSPLANT | Facility: CLINIC | Age: 72
End: 2022-03-09
Payer: MEDICARE

## 2022-03-10 ENCOUNTER — OFFICE VISIT (OUTPATIENT)
Dept: TRANSPLANT | Facility: CLINIC | Age: 72
End: 2022-03-10
Payer: MEDICARE

## 2022-03-10 ENCOUNTER — PATIENT MESSAGE (OUTPATIENT)
Dept: TRANSPLANT | Facility: CLINIC | Age: 72
End: 2022-03-10

## 2022-03-10 DIAGNOSIS — E55.9 VITAMIN D DEFICIENCY: Chronic | ICD-10-CM

## 2022-03-10 DIAGNOSIS — E11.21 TYPE 2 DIABETES MELLITUS WITH DIABETIC NEPHROPATHY, WITH LONG-TERM CURRENT USE OF INSULIN: ICD-10-CM

## 2022-03-10 DIAGNOSIS — N18.9 ANEMIA OF RENAL DISEASE: Chronic | ICD-10-CM

## 2022-03-10 DIAGNOSIS — Z79.01 CURRENT USE OF LONG TERM ANTICOAGULATION: Chronic | ICD-10-CM

## 2022-03-10 DIAGNOSIS — N05.2 MEMBRANOUS GLOMERULONEPHRITIS: ICD-10-CM

## 2022-03-10 DIAGNOSIS — Z79.4 TYPE 2 DIABETES MELLITUS WITH DIABETIC NEPHROPATHY, WITH LONG-TERM CURRENT USE OF INSULIN: ICD-10-CM

## 2022-03-10 DIAGNOSIS — N18.2 CKD (CHRONIC KIDNEY DISEASE) STAGE 2, GFR 60-89 ML/MIN: ICD-10-CM

## 2022-03-10 DIAGNOSIS — I10 ESSENTIAL HYPERTENSION: Chronic | ICD-10-CM

## 2022-03-10 DIAGNOSIS — Z94.0 KIDNEY REPLACED BY TRANSPLANT: ICD-10-CM

## 2022-03-10 DIAGNOSIS — Z79.60 LONG-TERM USE OF IMMUNOSUPPRESSANT MEDICATION: ICD-10-CM

## 2022-03-10 DIAGNOSIS — Z94.0 S/P KIDNEY TRANSPLANT: Primary | ICD-10-CM

## 2022-03-10 DIAGNOSIS — D63.1 ANEMIA OF RENAL DISEASE: Chronic | ICD-10-CM

## 2022-03-10 PROCEDURE — 99214 OFFICE O/P EST MOD 30 MIN: CPT | Mod: 95,,, | Performed by: NURSE PRACTITIONER

## 2022-03-10 PROCEDURE — 1160F RVW MEDS BY RX/DR IN RCRD: CPT | Mod: CPTII,95,, | Performed by: NURSE PRACTITIONER

## 2022-03-10 PROCEDURE — 1160F PR REVIEW ALL MEDS BY PRESCRIBER/CLIN PHARMACIST DOCUMENTED: ICD-10-PCS | Mod: CPTII,95,, | Performed by: NURSE PRACTITIONER

## 2022-03-10 PROCEDURE — 1159F MED LIST DOCD IN RCRD: CPT | Mod: CPTII,95,, | Performed by: NURSE PRACTITIONER

## 2022-03-10 PROCEDURE — 1159F PR MEDICATION LIST DOCUMENTED IN MEDICAL RECORD: ICD-10-PCS | Mod: CPTII,95,, | Performed by: NURSE PRACTITIONER

## 2022-03-10 PROCEDURE — 99214 PR OFFICE/OUTPT VISIT, EST, LEVL IV, 30-39 MIN: ICD-10-PCS | Mod: 95,,, | Performed by: NURSE PRACTITIONER

## 2022-03-10 RX ORDER — TACROLIMUS 1 MG/1
CAPSULE ORAL
Qty: 90 CAPSULE | Refills: 11 | Status: SHIPPED | OUTPATIENT
Start: 2022-03-10 | End: 2022-09-12 | Stop reason: DRUGHIGH

## 2022-03-10 NOTE — LETTER
March 10, 2022        Al Neves  [address]  [place] [address]  Phone: [phone]  Fax: [phone]             [facility]- Transplant 1st Fl  [address]  [facility] [address]  Phone: [phone]   Patient: Britt Benton   MR Number: [id number]   YOB: 1950   Date of Visit: 3/10/2022       Dear Dr. Al Neves    Thank you for referring Britt Benton to me for evaluation. Attached you will find relevant portions of my assessment and plan of care.    If you have questions, please do not hesitate to call me. I look forward to following Britt Benton along with you.    Sincerely,    Briana Acevedo, NP    Enclosure    If you would like to receive this communication electronically, please contact [email or web address] or [phone] to request [employer] access.    [employer] is a tool which provides read-only access to select patient information with whom you have a relationship. Its easy to use and provides real time access to review your patients record including encounter summaries, notes, results, and demographic information.    If you feel you have received this communication in error or would no longer like to receive these types of communications, please e-mail [email or web address]

## 2022-04-25 RX ORDER — MYCOPHENOLATE MOFETIL 250 MG/1
1000 CAPSULE ORAL 2 TIMES DAILY
Qty: 240 CAPSULE | Refills: 11 | Status: SHIPPED | OUTPATIENT
Start: 2022-04-25 | End: 2023-03-23 | Stop reason: SDUPTHER

## 2022-04-25 RX ORDER — PREDNISONE 5 MG/1
TABLET ORAL
Qty: 120 TABLET | Refills: 11 | Status: SHIPPED | OUTPATIENT
Start: 2022-04-25 | End: 2023-06-13 | Stop reason: SDUPTHER

## 2022-04-26 ENCOUNTER — TELEPHONE (OUTPATIENT)
Dept: TRANSPLANT | Facility: CLINIC | Age: 72
End: 2022-04-26
Payer: MEDICARE

## 2022-04-26 ENCOUNTER — PATIENT MESSAGE (OUTPATIENT)
Dept: TRANSPLANT | Facility: CLINIC | Age: 72
End: 2022-04-26
Payer: MEDICARE

## 2022-04-26 NOTE — TELEPHONE ENCOUNTER
Coordinator contacted patient to inquire if she repeated Prograf level last week. Message left on patient's voicemail & [facility] message sent to patient to contact coordinator. Waiting for patient's reply.

## 2022-04-27 NOTE — LETTER
January 11, 2022        Al Neves  [address]  [place] [address]  Phone: [phone]  Fax: [phone]             [facility]- Transplant 1st Fl  [address]  [facility] [address]  Phone: [phone]   Patient: Britt Benton   MR Number: [id number]   YOB: 1950   Date of Visit: 1/11/2022       Dear Dr. Al Neves    Thank you for referring Britt Benton to me for evaluation. Attached you will find relevant portions of my assessment and plan of care.    If you have questions, please do not hesitate to call me. I look forward to following Britt Benton along with you.    Sincerely,    Briana Acevedo, NP    Enclosure    If you would like to receive this communication electronically, please contact [email or web address] or [phone] to request [employer] access.    [employer] is a tool which provides read-only access to select patient information with whom you have a relationship. Its easy to use and provides real time access to review your patients record including encounter summaries, notes, results, and demographic information.    If you feel you have received this communication in error or would no longer like to receive these types of communications, please e-mail [email or web address]       
For information on Fall & Injury Prevention, visit: [email or web address] OR  [email or web address] OR  https://www.cdc.gov/steadi/patient.html

## 2022-05-03 ENCOUNTER — PATIENT MESSAGE (OUTPATIENT)
Dept: TRANSPLANT | Facility: CLINIC | Age: 72
End: 2022-05-03
Payer: MEDICARE

## 2022-05-10 ENCOUNTER — DOCUMENTATION ONLY (OUTPATIENT)
Dept: TRANSPLANT | Facility: CLINIC | Age: 72
End: 2022-05-10
Payer: MEDICARE

## 2022-05-10 LAB
EXT ANC: NORMAL
EXT BK VIRUS DNA QN PCR: NORMAL
EXT TACROLIMUS LVL: 8.8

## 2022-05-11 ENCOUNTER — PATIENT MESSAGE (OUTPATIENT)
Dept: RESEARCH | Facility: CLINIC | Age: 72
End: 2022-05-11
Payer: MEDICARE

## 2022-08-09 ENCOUNTER — TELEPHONE (OUTPATIENT)
Dept: TRANSPLANT | Facility: CLINIC | Age: 72
End: 2022-08-09
Payer: MEDICARE

## 2022-08-10 DIAGNOSIS — E83.42 HYPOMAGNESEMIA: ICD-10-CM

## 2022-08-10 DIAGNOSIS — Z94.0 KIDNEY REPLACED BY TRANSPLANT: ICD-10-CM

## 2022-09-06 ENCOUNTER — DOCUMENTATION ONLY (OUTPATIENT)
Dept: TRANSPLANT | Facility: CLINIC | Age: 72
End: 2022-09-06
Payer: MEDICARE

## 2022-09-06 LAB
EXT ALBUMIN: 3.7 (ref 3.4–5)
EXT ALKALINE PHOSPHATASE: 67 (ref 45–117)
EXT ALT: 20 (ref 13–56)
EXT ANC: ABNORMAL
EXT AST: 12 (ref 15–37)
EXT BACTERIA UA: ABNORMAL
EXT BILIRUBIN DIRECT: <0.1 MG/DL (ref 0–0.2)
EXT BILIRUBIN TOTAL: 0.3 (ref 0.2–1)
EXT BK VIRUS DNA QN PCR: NOT DETECTED
EXT BUN: 29 (ref 7–18)
EXT CALCIUM: 10.2 (ref 8.5–10.1)
EXT CHLORIDE: 109 (ref 98–107)
EXT CO2: 22 (ref 21–32)
EXT CREATININE: 0.9 MG/DL
EXT EOSINOPHIL%: 1.1 (ref 0–6)
EXT GFR MDRD NON AF AMER: 60
EXT GLUCOSE UA: ABNORMAL
EXT GLUCOSE: 93 (ref 74–106)
EXT HEMATOCRIT: 44.4 (ref 37–47)
EXT HEMOGLOBIN: 14 (ref 12–16)
EXT LYMPH%: 51.1 (ref 20–45)
EXT MAGNESIUM: 1.9 (ref 1.6–2.6)
EXT MONOCYTES%: 10.6 (ref 2–10)
EXT NITRITES UA: ABNORMAL
EXT PHOSPHORUS: 1.7 (ref 2.5–4.9)
EXT PLATELETS: 328 (ref 130–400)
EXT POTASSIUM: 3 (ref 3.5–5.1)
EXT PROT/CREAT RATIO UR: 0.29
EXT PROTEIN TOTAL: 7.1 (ref 6.4–8.2)
EXT PROTEIN UA: 10
EXT RBC UA: ABNORMAL
EXT SEGS%: 36.2 (ref 50–80)
EXT SODIUM: 141 MMOL/L (ref 131–143)
EXT TACROLIMUS LVL: 8
EXT URINE APPEARANCE: CLEAR
EXT URINE COLOR: ABNORMAL
EXT URINE PH: 5.5 (ref 5–8)
EXT URINE SP GRAVITY: 1.02 (ref 1–1.03)
EXT WBC UA: ABNORMAL
EXT WBC: 19.8 (ref 4.5–10)

## 2022-09-12 ENCOUNTER — PATIENT MESSAGE (OUTPATIENT)
Dept: TRANSPLANT | Facility: CLINIC | Age: 72
End: 2022-09-12
Payer: MEDICARE

## 2022-09-12 DIAGNOSIS — E87.6 HYPOKALEMIA: ICD-10-CM

## 2022-09-12 DIAGNOSIS — Z94.0 KIDNEY REPLACED BY TRANSPLANT: Primary | ICD-10-CM

## 2022-09-12 NOTE — TELEPHONE ENCOUNTER
----- Message from Era Broderick RN sent at 9/12/2022  5:41 PM CDT -----    ----- Message -----  From: Fredy Alexander Jr., MD  Sent: 9/12/2022   4:23 PM CDT  To: [facility] Post-Kidney Transplant Clinical    Phosphorus and potassium are low and WBC is elevated more than usual. Please see if she is feeling ill. Please see if she is taking her phospha neutral. If she feels ill, she should go to urgent care. If she is taking her phospha neutral, she should increase dose from 250mg po bid to 500mg po BID and repeat labs next week. If she hasn't been taking it, ask her to start taking it and repeat labs next week. Tacro level is ok. Kidney function is ok.

## 2022-09-12 NOTE — TELEPHONE ENCOUNTER
Coordinator attempted to contact patient but was unsuccessful. Call went directly to [facility] voicemail; message left on patient's voicemail to contact coordinator regarding abnormal lab results. Message also left for patient to report to urgent care or ER if she's not feeling good due to abnormal/elevated WBC & low potassium. Coordinator will attempt to reach the patient tomorrow.     [facility] message will also be sent to patient.     Hi Ms. Benton,     I tried calling you this evening but the call went to the voicemail. I'm sending you this message to let you know that Dr. Martinez reviewed your 9/6/22 lab results. Your white blood cell count is elevated =19.8. Let us know if you are feeling bad or experiencing symptoms of an infection.     Your potassium is low =3.0. She wants you to take potassium chloride tablets KCL 20Meq one tablet daily for 2 weeks. The prescription will be sent to your local pharmacy. Also make sure you follow up with your general nephrologist for further management of your potassium.     Your prograf level =8.0 is higher than it should be. She wants you to decrease your prograf dose to 1.5mg twice daily. We'll recheck your level in 4 weeks once you receive the 0.5mg capsules and make the dose change. In the meantime, until you receive the 0.5mg capsules continue taking 2mg twice daily.     I'll try calling you tomorrow to make sure you receive the message.     Thanks,    Era              ----- Message from Era Broderick RN sent at 9/12/2022  5:42 PM CDT -----    ----- Message -----  From: Lili Bowden MD  Sent: 9/12/2022   4:21 PM CDT  To: [facility] Post-Kidney Transplant Clinical    Change tacro to 1.5 mg BID; she can stay on current 2/2 until gets 0.5 mg caps. Repeat level 1 mo later.  K-3.0; increase K in diet and check if she is feeling poorly. She can also take KCL 20 meq daily x 2 weeks. Also ask her to discuss low K with her nephrologist.

## 2022-09-13 RX ORDER — POTASSIUM CHLORIDE 20 MEQ/1
20 TABLET, EXTENDED RELEASE ORAL DAILY
Qty: 14 TABLET | Refills: 0 | Status: SHIPPED | OUTPATIENT
Start: 2022-09-13 | End: 2022-09-27

## 2022-09-13 RX ORDER — TACROLIMUS 0.5 MG/1
1.5 CAPSULE ORAL EVERY 12 HOURS
Qty: 180 CAPSULE | Refills: 11 | Status: SHIPPED | OUTPATIENT
Start: 2022-09-13 | End: 2023-09-13

## 2022-09-19 NOTE — PROGRESS NOTES
Kidney Post-Transplant Assessment    Referring Physician: Al Neves  Current Nephrologist: Al Neves    ORGAN: LEFT KIDNEY  Donor Type: donation after circulatory death   PHS Increased Risk: yes  Cold Ischemia: 862 mins  Induction Medications: simulect - basiliximab    Subjective:   The patient location is:  Kidney Post-Transplant Assessment  The chief complaint leading to consultation is: [place]       Visit type: audiovisual    Face to Face time with patient:  30 minutes of total time spent on the encounter, which includes face to face time and non-face to face time preparing to see the patient (eg, review of tests), Obtaining and/or reviewing separately obtained history, Documenting clinical information in the electronic or other health record, Independently interpreting results (not separately reported) and communicating results to the patient/family/caregiver, or Care coordination (not separately reported).         Each patient to whom he or she provides medical services by telemedicine is:  (1) informed of the relationship between the physician and patient and the respective role of any other health care provider with respect to management of the patient; and (2) notified that he or she may decline to receive medical services by telemedicine and may withdraw from such care at any time.    Notes:     CC:  Reassessment of renal allograft function and management of chronic immunosuppression.    HPI:  Ms. Benton is a 72 y.o. 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 Her   baseline creatinine is 0.8-1.1 She takes mycophenolate mofetil, prednisone and tacrolimus for maintenance immunosuppression.  No hospitalizations since last visit.   IMMUNOSUPPRESSION: Tacrolimus/Mycophenolate/Prednisone   PCP PROPHYLAXIS: Bactrim until 9/21/21   CMV PROPHYLAXIS: Valcyte  until 6/23/21   FUNGAL PROPHYLAXIS: Nystatin until 5/25/21     Interval HX:  Denies any s/s infection or illness  Last seen by txp 3/10/22  Over all doing well.  Is f/u with general nephrology    DM--> f/u with endocrinology  Dr Pedroza in [place]    using Dexcom  Lab Results   Component Value Date    HGBA1C 6.4 (H) 09/07/2021     Intake 2L +  UOP  No problems reported   BP     BP Readings from Last 3 Encounters:   09/10/21 126/80   06/18/21 126/75   05/06/21 130/63     Peripheral edema-no   Weight -stable        Fx assessment--Is mowing her lawn, feels good. , no concerns     Lab /diagnostic results reviewed with patient today.   All questions answered      Past Medical History:   Diagnosis Date    Anemia of renal disease     ESRD on peritoneal dialysis     Essential hypertension     Hyperlipidemia     Hypothyroidism     Stroke x2 in 2014 and 2015     Type 2 diabetes mellitus with diabetic nephropathy     Vitamin D deficiency        Review of Systems   Constitutional:  Negative for activity change, appetite change, chills, fatigue, fever and unexpected weight change.   HENT:  Negative for congestion, facial swelling, postnasal drip, rhinorrhea, sinus pressure, sore throat and trouble swallowing.    Eyes:  Positive for visual disturbance. Negative for pain and redness.        Glasses   Respiratory:  Negative for cough, chest tightness, shortness of breath and wheezing.    Cardiovascular:  Negative for chest pain, palpitations and leg swelling.   Gastrointestinal:  Negative for abdominal pain, diarrhea, nausea and vomiting.   Genitourinary:  Negative for dysuria, flank pain and urgency.   Musculoskeletal:  Negative for gait problem, neck pain and neck stiffness.   Skin:  Negative for rash and wound.   Allergic/Immunologic: Positive for immunocompromised state. Negative for environmental allergies and food allergies.   Neurological:  Negative for dizziness, weakness, light-headedness and headaches.    Psychiatric/Behavioral:  Negative for agitation and confusion. The patient is not nervous/anxious.      Objective:   There were no vitals taken for this visit.body mass index is unknown because there is no height or weight on file.    Physical Exam    Labs:  Lab Results   Component Value Date    WBC 13.29 (H) 04/15/2021    HGB 9.8 (L) 04/15/2021    HCT 32.5 (L) 04/15/2021     04/15/2021    K 4.5 04/15/2021     (H) 04/15/2021    CO2 22 (L) 04/15/2021    BUN 20 04/15/2021    CREATININE 1.1 04/15/2021    EGFRNONAA 50.3 (A) 04/15/2021    CALCIUM 9.3 04/15/2021    PHOS 1.6 (L) 04/15/2021    MG 1.7 04/15/2021    ALBUMIN 3.7 04/15/2021    AST 20 04/12/2021    ALT 30 04/12/2021    UTPCR 0.23 (H) 04/05/2021    .0 (H) 03/25/2021    TACROLIMUS 9.6 04/15/2021       Lab Results   Component Value Date    EXTANC  09/06/2022      Comment:      KIDNEY; 18 month protocol labs; RTC appt 9/20/22    EXTWBC 19.8 (A) 09/06/2022    EXTSEGS 36.2 (A) 09/06/2022    EXTPLATELETS 328 09/06/2022    EXTHEMOGLOBI 14 09/06/2022    EXTHEMATOCRI 44.4 09/06/2022    EXTCREATININ 0.9 09/06/2022    EXTSODIUM 141 09/06/2022    EXTPOTASSIUM 3.0 (A) 09/06/2022    EXTBUN 29 (A) 09/06/2022    EXTCO2 22 09/06/2022    EXTCALCIUM 10.2 (A) 09/06/2022    EXTPHOSPHORU 1.7 (A) 09/06/2022    EXTGLUCOSE 93 09/06/2022    EXTALBUMIN 3.7 09/06/2022    EXTAST 12 (A) 09/06/2022    EXTALT 20 09/06/2022    EXTBILITOTAL 0.3 09/06/2022       Lab Results   Component Value Date    EXTTACROLVL 8.0 09/06/2022    EXTPROTCRE 0.29 09/06/2022    EXTPTHINTACT 106 (A) 03/01/2022    EXTPROTEINUA 10 09/06/2022    EXTWBCUA 0-2 09/06/2022    EXTRBCUA 0-2 09/06/2022       Labs were reviewed with the patient    Assessment:     1. S/P kidney transplant    2. Long-term use of immunosuppressant medication    3. Type 2 diabetes mellitus with diabetic nephropathy, with long-term current use of insulin    4. Essential hypertension    5. Membranous glomerulonephritis    6. CKD  (chronic kidney disease) stage 2, GFR 60-89 ml/min        Plan:   Hypophosphatemia: Increased to  mg BID  on 9/13/22  Leukocytosis: labs will be repeated 1 week   Repeat trough ~ 1 month--dose recently adjusted --Pt reports taking 1.5 mg in AM only --reviewed prograf dosing 1.5 mg (3 pills. 0.5 mg each) every 12 hours--written instructions provided on AVS   Hypokalemia: High K diet, 9/13/22 KCL 20 meq daily x 2 weeks was added--further mgm t deferred to gen nephrology   Denies any Recent illness --leukocytosis   Lower Mg ox    800 mg BID       Follow-up:   1. CKD stage: 2   2. Immunosuppression:   Prograf trough 8.0 , supra therapeutic, target 4-7. Dose Lowered on 9/13/22 to  Prograf  1.5/1.5, [id number] Mg BID, and Prednisone  5 mg QD prescribed,   Will continue to monitor for drug toxicities    3. Allograft Function: Stable. Continue good po hydration.   9/6/22             DM: continue insulin/ med regime as prescribed . Mgmt deferred to endocrinology  Lab Results   Component Value Date    HGBA1C 6.4 (H) 09/07/2021       4. Hypertension management: advise low salt diet and home BP monitoring     Nifedipine        5. Metabolic Bone Disease/Secondary Hyperparathyroidism:stable  Will monitor PTH, CA and Vit D/guidelines,    Lower Mg ox    800 mg BID   Hypophosphatemia: Increase  mg BID  on 9/13/22 9/6/22         6. Electrolytes:  Will monitor /guidelines   Hypokalemia: High K diet, 9/13/22 KCL 20 meq daily x 2 weeks was added--further mgm t deferred to gen nephrology     9/6/22          7. Anemia: stable. No need for intervention    Will monitor /guidelines   Leukocytosis: labs will be repeated 1 week   9/6/22        8.  Cytopenias: no significant cytopenias will monitor as per our guidelines. Medicine list reviewed including potential causes of drug-induced cytopenias    9.Proteinuria: continue p/c ratio as per guidelines            9/6/2022  1yr 5mo   EXT PROT/CREAT Ratio UR 0.29  Urine protein=24;  Urine creat=24           10. BK virus infection screening:  will continue to monitor/ guidelines        9/6/2022  1yr 5mo   EXT BK Virus DNA QN PCR NOT DETECTED       11. Weight education: provided during the clinic visit   There is no height or weight on file to calculate BMI.          12.Patient safety education regarding immunosuppression including prophylaxis posttransplant for CMV, PCP : Education provided about vaccination and prevention of infections            Follow-up:   Clinic: return to transplant clinic weekly for the first month after transplant; every 2 weeks during months 2-3; then at 6-, 9-, 12-, 18-, 24-, and 36- months post-transplant to reassess for complications from immunosuppression toxicity and monitor for rejection.  Annually thereafter.    Labs: since patient remains at high risk for rejection and drug-related complications that warrant close monitoring, labs will be ordered as follows: continue twice weekly CBC, renal panel, and drug level for first month; then same labs once weekly through 3rd month post-transplant.  Urine for UA and protein/creatinine ratio monthly.  Serum BK - PCR at 1-, 3-, 6-, 9-, 12-, 18-, 24-, 36-, 48-, and 60 months post-transplant.  Hepatic panel at 1-, 2-, 3-, 6-, 9-, 12-, 18-, 24-, and 36- months post-transplant.    Briana Acevedo NP       Education:   Material provided to the patient.  Patient reminded to call with any health changes since these can be early signs of significant complications.  Also, I advised the patient to be sure any new medications or changes of old medications are discussed with either a pharmacist or physician knowledgeable with transplant to avoid rejection/drug toxicity related to significant drug interactions.

## 2022-09-20 ENCOUNTER — OFFICE VISIT (OUTPATIENT)
Dept: TRANSPLANT | Facility: CLINIC | Age: 72
End: 2022-09-20
Payer: MEDICARE

## 2022-09-20 DIAGNOSIS — N05.2 MEMBRANOUS GLOMERULONEPHRITIS: ICD-10-CM

## 2022-09-20 DIAGNOSIS — Z79.4 TYPE 2 DIABETES MELLITUS WITH DIABETIC NEPHROPATHY, WITH LONG-TERM CURRENT USE OF INSULIN: ICD-10-CM

## 2022-09-20 DIAGNOSIS — Z94.0 S/P KIDNEY TRANSPLANT: Primary | ICD-10-CM

## 2022-09-20 DIAGNOSIS — N18.2 CKD (CHRONIC KIDNEY DISEASE) STAGE 2, GFR 60-89 ML/MIN: ICD-10-CM

## 2022-09-20 DIAGNOSIS — Z94.0 KIDNEY REPLACED BY TRANSPLANT: ICD-10-CM

## 2022-09-20 DIAGNOSIS — I10 ESSENTIAL HYPERTENSION: Chronic | ICD-10-CM

## 2022-09-20 DIAGNOSIS — E11.21 TYPE 2 DIABETES MELLITUS WITH DIABETIC NEPHROPATHY, WITH LONG-TERM CURRENT USE OF INSULIN: ICD-10-CM

## 2022-09-20 DIAGNOSIS — Z79.60 LONG-TERM USE OF IMMUNOSUPPRESSANT MEDICATION: ICD-10-CM

## 2022-09-20 DIAGNOSIS — E83.42 HYPOMAGNESEMIA: ICD-10-CM

## 2022-09-20 PROCEDURE — 1159F PR MEDICATION LIST DOCUMENTED IN MEDICAL RECORD: ICD-10-PCS | Mod: CPTII,95,, | Performed by: NURSE PRACTITIONER

## 2022-09-20 PROCEDURE — 1160F RVW MEDS BY RX/DR IN RCRD: CPT | Mod: CPTII,95,, | Performed by: NURSE PRACTITIONER

## 2022-09-20 PROCEDURE — 1159F MED LIST DOCD IN RCRD: CPT | Mod: CPTII,95,, | Performed by: NURSE PRACTITIONER

## 2022-09-20 PROCEDURE — 99214 PR OFFICE/OUTPT VISIT, EST, LEVL IV, 30-39 MIN: ICD-10-PCS | Mod: 95,,, | Performed by: NURSE PRACTITIONER

## 2022-09-20 PROCEDURE — 1160F PR REVIEW ALL MEDS BY PRESCRIBER/CLIN PHARMACIST DOCUMENTED: ICD-10-PCS | Mod: CPTII,95,, | Performed by: NURSE PRACTITIONER

## 2022-09-20 PROCEDURE — 99214 OFFICE O/P EST MOD 30 MIN: CPT | Mod: 95,,, | Performed by: NURSE PRACTITIONER

## 2022-09-20 RX ORDER — LANOLIN ALCOHOL/MO/W.PET/CERES
800 CREAM (GRAM) TOPICAL 2 TIMES DAILY
Qty: 180 TABLET | Refills: 11 | Status: SHIPPED | OUTPATIENT
Start: 2022-09-20

## 2022-09-20 NOTE — LETTER
September 20, 2022        Al Neves  [address]  [place] [address]  Phone: [phone]  Fax: [phone]             [facility] 1st Fl  [address]  [place] [address]  Phone: [phone]   Patient: Joy Benton   MR Number: [id number]   YOB: 1950   Date of Visit: 9/20/2022       Dear Dr. Al Neves    Thank you for referring Joy Benton to me for evaluation. Attached you will find relevant portions of my assessment and plan of care.    If you have questions, please do not hesitate to call me. I look forward to following Joy Benton along with you.    Sincerely,    Briana Acevedo, NP    Enclosure    If you would like to receive this communication electronically, please contact [email or web address] or [phone] to request [employer] access.    [employer] is a tool which provides read-only access to select patient information with whom you have a relationship. Its easy to use and provides real time access to review your patients record including encounter summaries, notes, results, and demographic information.    If you feel you have received this communication in error or would no longer like to receive these types of communications, please e-mail [email or web address]

## 2022-09-20 NOTE — PATIENT INSTRUCTIONS
Prograf dose--1.5 mg in the AM and PM (three of there 0.5 mg pills) -- at 8 am and 8 pm    Lower Mg ox  800 mg  (2 pills) every 12 hours --at 8 am and 8 pm     Increase phos pills 500 mg (2 pills) every 12 hours  --at 6 am and 6 PM

## 2022-10-18 ENCOUNTER — PATIENT MESSAGE (OUTPATIENT)
Dept: TRANSPLANT | Facility: CLINIC | Age: 72
End: 2022-10-18
Payer: MEDICARE

## 2023-02-27 ENCOUNTER — TELEPHONE (OUTPATIENT)
Dept: TRANSPLANT | Facility: CLINIC | Age: 73
End: 2023-02-27
Payer: MEDICARE

## 2023-03-08 ENCOUNTER — DOCUMENTATION ONLY (OUTPATIENT)
Dept: TRANSPLANT | Facility: CLINIC | Age: 73
End: 2023-03-08
Payer: MEDICARE

## 2023-03-08 LAB
EXT ALBUMIN: 3.4 (ref 3.4–5)
EXT ALKALINE PHOSPHATASE: 49 (ref 45–117)
EXT ALT: 22 (ref 13–56)
EXT AST: 13 (ref 15–37)
EXT BACTERIA UA: ABNORMAL
EXT BILIRUBIN DIRECT: <0.1 MG/DL (ref 0–0.2)
EXT BILIRUBIN TOTAL: 0.3 (ref 0.2–1)
EXT BK VIRUS DNA QN PCR: ABNORMAL
EXT BUN: 31 (ref 7–18)
EXT CALCIUM: 9.4 (ref 8.5–10.1)
EXT CHLORIDE: 108 (ref 98–107)
EXT CO2: 26 (ref 21–32)
EXT CREATININE UA: 84
EXT CREATININE: 0.74 MG/DL (ref 0.55–1.02)
EXT EGFR NO RACE VARIABLE: >60
EXT EOSINOPHIL%: 1.3 (ref 0–6)
EXT GLUCOSE UA: NEGATIVE
EXT GLUCOSE: 157 (ref 74–106)
EXT HEMATOCRIT: 41.3 (ref 37–47)
EXT HEMOGLOBIN: 13 (ref 12–16)
EXT LYMPH%: 38.6 (ref 20–45)
EXT MAGNESIUM: 1.9 (ref 1.6–2.6)
EXT MONOCYTES%: 11.7 (ref 2–10)
EXT NITRITES UA: NEGATIVE
EXT PHOSPHORUS: 2.9 (ref 2.5–4.9)
EXT PLATELETS: 254 (ref 130–400)
EXT POTASSIUM: 3.8 (ref 3.5–5.1)
EXT PROT/CREAT RATIO UR: 0.41
EXT PROTEIN TOTAL: 6.6 (ref 6.4–8.2)
EXT PROTEIN UA: 20
EXT RBC UA: ABNORMAL
EXT SEGS%: 47.4 (ref 50–80)
EXT SODIUM: 137 MMOL/L (ref 131–143)
EXT TACROLIMUS LVL: 6.4 (ref 4–7)
EXT URINE PROTEIN: 34.8
EXT WBC UA: ABNORMAL
EXT WBC: 9.8 (ref 4.5–10)

## 2023-03-14 ENCOUNTER — DOCUMENTATION ONLY (OUTPATIENT)
Dept: TRANSPLANT | Facility: CLINIC | Age: 73
End: 2023-03-14
Payer: MEDICARE

## 2023-03-23 ENCOUNTER — OFFICE VISIT (OUTPATIENT)
Dept: TRANSPLANT | Facility: CLINIC | Age: 73
End: 2023-03-23
Payer: MEDICARE

## 2023-03-23 VITALS — SYSTOLIC BLOOD PRESSURE: 118 MMHG | BODY MASS INDEX: 26.04 KG/M2 | WEIGHT: 147 LBS | DIASTOLIC BLOOD PRESSURE: 70 MMHG

## 2023-03-23 DIAGNOSIS — I10 ESSENTIAL HYPERTENSION: Chronic | ICD-10-CM

## 2023-03-23 DIAGNOSIS — Z94.0 S/P KIDNEY TRANSPLANT: ICD-10-CM

## 2023-03-23 DIAGNOSIS — Z79.899 IMMUNOSUPPRESSIVE MANAGEMENT ENCOUNTER FOLLOWING KIDNEY TRANSPLANT: ICD-10-CM

## 2023-03-23 DIAGNOSIS — N05.2 MEMBRANOUS GLOMERULONEPHRITIS: ICD-10-CM

## 2023-03-23 DIAGNOSIS — N18.2 CKD (CHRONIC KIDNEY DISEASE) STAGE 2, GFR 60-89 ML/MIN: Primary | ICD-10-CM

## 2023-03-23 DIAGNOSIS — I63.9 STROKE OF UNKNOWN ETIOLOGY: ICD-10-CM

## 2023-03-23 DIAGNOSIS — E11.21 TYPE 2 DIABETES MELLITUS WITH DIABETIC NEPHROPATHY, WITH LONG-TERM CURRENT USE OF INSULIN: ICD-10-CM

## 2023-03-23 DIAGNOSIS — Z94.0 IMMUNOSUPPRESSIVE MANAGEMENT ENCOUNTER FOLLOWING KIDNEY TRANSPLANT: ICD-10-CM

## 2023-03-23 DIAGNOSIS — Z79.4 TYPE 2 DIABETES MELLITUS WITH DIABETIC NEPHROPATHY, WITH LONG-TERM CURRENT USE OF INSULIN: ICD-10-CM

## 2023-03-23 PROCEDURE — 99214 PR OFFICE/OUTPT VISIT, EST, LEVL IV, 30-39 MIN: ICD-10-PCS | Mod: 95,,, | Performed by: INTERNAL MEDICINE

## 2023-03-23 PROCEDURE — 99214 OFFICE O/P EST MOD 30 MIN: CPT | Mod: 95,,, | Performed by: INTERNAL MEDICINE

## 2023-03-23 PROCEDURE — 3066F PR DOCUMENTATION OF TREATMENT FOR NEPHROPATHY: ICD-10-PCS | Mod: CPTII,95,, | Performed by: INTERNAL MEDICINE

## 2023-03-23 PROCEDURE — 3074F PR MOST RECENT SYSTOLIC BLOOD PRESSURE < 130 MM HG: ICD-10-PCS | Mod: CPTII,95,, | Performed by: INTERNAL MEDICINE

## 2023-03-23 PROCEDURE — 3066F NEPHROPATHY DOC TX: CPT | Mod: CPTII,95,, | Performed by: INTERNAL MEDICINE

## 2023-03-23 PROCEDURE — 3074F SYST BP LT 130 MM HG: CPT | Mod: CPTII,95,, | Performed by: INTERNAL MEDICINE

## 2023-03-23 PROCEDURE — 3078F DIAST BP <80 MM HG: CPT | Mod: CPTII,95,, | Performed by: INTERNAL MEDICINE

## 2023-03-23 PROCEDURE — 3008F PR BODY MASS INDEX (BMI) DOCUMENTED: ICD-10-PCS | Mod: CPTII,95,, | Performed by: INTERNAL MEDICINE

## 2023-03-23 PROCEDURE — 3078F PR MOST RECENT DIASTOLIC BLOOD PRESSURE < 80 MM HG: ICD-10-PCS | Mod: CPTII,95,, | Performed by: INTERNAL MEDICINE

## 2023-03-23 PROCEDURE — 3008F BODY MASS INDEX DOCD: CPT | Mod: CPTII,95,, | Performed by: INTERNAL MEDICINE

## 2023-03-23 RX ORDER — MYCOPHENOLATE MOFETIL 250 MG/1
1000 CAPSULE ORAL 2 TIMES DAILY
Qty: 240 CAPSULE | Refills: 11 | Status: SHIPPED | OUTPATIENT
Start: 2023-03-23 | End: 2024-03-22

## 2023-03-23 NOTE — LETTER
March 23, 2023        Al Neves  [address]  [place] [address]  Phone: [phone]  Fax: [phone]             [facility]- [facility] 1st Fl  [address]  [facility] [address]  Phone: [phone]   Patient: Joy Benton   MR Number: [id number]   YOB: 1950   Date of Visit: 3/23/2023       Dear Dr. Al Neves    Thank you for referring Joy Benton to me for evaluation. Attached you will find relevant portions of my assessment and plan of care.    If you have questions, please do not hesitate to call me. I look forward to following Joy Benton along with you.    Sincerely,    Dino Torres MD    Enclosure    If you would like to receive this communication electronically, please contact [email or web address] or [phone] to request [employer] access.    [employer] is a tool which provides read-only access to select patient information with whom you have a relationship. Its easy to use and provides real time access to review your patients record including encounter summaries, notes, results, and demographic information.    If you feel you have received this communication in error or would no longer like to receive these types of communications, please e-mail [email or web address]

## 2023-03-23 NOTE — PROGRESS NOTES
The patient location is:  home  The chief complaint leading to consultation is:  Reassessment of kidney function, complex immunosuppressive medication, BK infection protocol, management of electrolytes, anemia, proteinuria and blood pressure advice.   Visit type: Virtual visit with synchronous audio and video  Total time spent with patient: 19  min  Each patient to whom he or she provides medical services by telemedicine is:  (1) informed of the relationship between the physician and patient and the respective role of any other health care provider with respect to management of the patient; and (2) notified that he or she may decline to receive medical services by telemedicine and may withdraw from such care at any time.    Kidney Post-Transplant Assessment    Referring Physician: Al Neves  Current Nephrologist: Al Neves    ORGAN: LEFT KIDNEY  Donor Type: donation after circulatory death   PHS Increased Risk: yes  Cold Ischemia: 862 mins  Induction Medications: simulect - basiliximab    Subjective:     CC:  Reassessment of renal allograft function and management of chronic immunosuppression.    HPI:  Ms. Benton is a 73 y.o. year old White female who received a donation after circulatory death  kidney transplant on 3/25/21.  She has CKD stage 2 - GFR 60-89 and her baseline creatinine is between 0.7. She takes mycophenolate mofetil, prednisone, and tacrolimus for maintenance immunosuppression. She denies any recent hospitalizations or ER visits since her previous clinic visit.    She feels great    No changes    She is following with her local MD's    He is following with a nephrologist.l Advised to see her local nephrologist every 3 to 4 months     Current Outpatient Medications on File Prior to Visit   Medication Sig Dispense Refill    ACCU-CHEK VINITA PLUS TEST STRP Strp 3 times daily 150 strip 11    ACCU-CHEK SOFTCLIX LANCETS Misc       aspirin (ECOTRIN) 81 MG EC tablet Take 1 tablet (81 mg total) by  "mouth once daily. 30 tablet 11    blood-glucose sensor (DEXCOM G6 SENSOR) Stephani Use 1 device, exchange every 10 days 3 each 11    blood-glucose transmitter (DEXCOM G6 TRANSMITTER) Stephani Use 1 transmitter, every 3 months as directed 1 Device 4    cetirizine (ZYRTEC) 10 MG tablet Take 10 mg by mouth once daily.      famotidine (PEPCID) 20 MG tablet Take 1 tablet (20 mg total) by mouth once daily. (Patient taking differently: Take 20 mg by mouth 2 (two) times daily. ) 30 tablet 4    insulin aspart U-100 (NOVOLOG) 100 unit/mL (3 mL) InPn pen Inject 12 Units into the skin 3 (three) times daily. + sliding scale (TDD 51 units) 15 mL 11    insulin glargine U-300 conc (TOUJEO MAX U-300 SOLOSTAR) 300 unit/mL (3 mL) insulin pen Inject 20 Units into the skin once daily.      k phos di & mono-sod phos mono (PHOSPHA 250 NEUTRAL) 250 mg Tab Take 2 tablets by mouth 2 (two) times daily. 120 tablet 11    levothyroxine (SYNTHROID) 100 MCG tablet Take 1 tablet (100 mcg total) by mouth once daily. 30 tablet 5    magnesium oxide (MAG-OX) 400 mg (241.3 mg magnesium) tablet Take 2 tablets (800 mg total) by mouth 2 (two) times daily. 180 tablet 11    montelukast (SINGULAIR) 10 mg tablet Take 1 tablet (10 mg total) by mouth once daily. 30 tablet 5    multivitamin Tab Take 1 tablet by mouth once daily. 30 tablet 5    mycophenolate (CELLCEPT) 250 mg Cap Take 4 capsules (1,000 mg total) by mouth 2 (two) times daily. 240 capsule 11    NIFEdipine (PROCARDIA-XL) 30 MG (OSM) 24 hr tablet Take 2 tablets (60 mg total) by mouth once daily. (Patient taking differently: Take 60 mg by mouth once daily. Hold for SBP<130) 30 tablet 6    omega-3 fatty acids/fish oil (FISH OIL-OMEGA-3 FATTY ACIDS) 300-1,000 mg capsule Take 1 capsule by mouth once daily.      pen needle, diabetic (BD ULTRA-FINE PLACIDO PEN NEEDLE) 32 gauge x 5/32" Ndle Use to inject insulin into the skin 4 (four) times daily. 200 each 10    predniSONE (DELTASONE) 5 MG tablet Take 4 tablets (20mg) " by mouth daily 3/27- 4/24, then take 3 tablets (15mg) daily 4/25-5/25, then take 2 tablets (10mg) 5/26-6/25, then take 1 tablet (5mg) daily 6/26/21- 120 tablet 11    rosuvastatin (CRESTOR) 20 MG tablet Take 1 tablet (20 mg total) by mouth once daily. 30 tablet 5    tacrolimus (PROGRAF) 0.5 MG Cap Take 3 capsules (1.5 mg total) by mouth every 12 (twelve) hours. Z94.0;Kidney txp on 3/29/2021 180 capsule 11     No current facility-administered medications on file prior to visit.      0.7  Review of Systems    Skin: no skin rash  CNS; no headaches, blurred vision, seizure, or syncope  ENT: No JVD,  Adenopathies,  nasal congestion. No oral lesions  Cardiac: No chest pain, dyspnea, claudication, edema or palpitations  Respiratory: No SOB, cough, hemoptysis   Gastro-intestinal: No diarrhea, constipation, abdominal pain, nausea, vomit. No ascitis  Genitourinary: no hematuria, dysuria, frequency, frequency  Musculoskeletal: joint pain, arthritis or vasculitic changes  Psych: alert awake, oriented, No cranial nerves deficit.      Objective:         Physical Exam    /70   Wt 66.7 kg (147 lb)   BMI 26.04 kg/m²       Labs:  Lab Results   Component Value Date    WBC 13.29 (H) 04/15/2021    HGB 9.8 (L) 04/15/2021    HCT 32.5 (L) 04/15/2021     04/15/2021    K 4.5 04/15/2021     (H) 04/15/2021    CO2 22 (L) 04/15/2021    BUN 20 04/15/2021    CREATININE 1.1 04/15/2021    CALCIUM 9.3 04/15/2021    PHOS 1.6 (L) 04/15/2021    MG 1.7 04/15/2021    ALBUMIN 3.7 04/15/2021    AST 20 04/12/2021    ALT 30 04/12/2021    UTPCR 0.23 (H) 04/05/2021    .0 (H) 03/25/2021    TACROLIMUS 9.6 04/15/2021       Lab Results   Component Value Date    EXTANC  09/06/2022      Comment:      KIDNEY; 18 month protocol labs; RTC appt 9/20/22    EXTWBC 9.8 03/07/2023    EXTSEGS 47.4 (A) 03/07/2023    EXTPLATELETS 254 03/07/2023    EXTHEMOGLOBI 13 03/07/2023    EXTHEMATOCRI 41.3 03/07/2023    EXTCREATININ 0.741 03/07/2023     EXTCREATININ 84 03/07/2023    EXTSODIUM 137 03/07/2023    EXTPOTASSIUM 3.8 03/07/2023    EXTBUN 31 (A) 03/07/2023    EXTCO2 26 03/07/2023    EXTCALCIUM 9.4 03/07/2023    EXTPHOSPHORU 2.9 03/07/2023    EXTGLUCOSE 157 (A) 03/07/2023    EXTALBUMIN 3.4 03/07/2023    EXTAST 13 (A) 03/07/2023    EXTALT 22 03/07/2023    EXTBILITOTAL 0.3 03/07/2023       Lab Results   Component Value Date    EXTTACROLVL 6.4 03/07/2023    EXTPROTCRE 0.41 03/07/2023    EXTPTHINTACT 106 (A) 03/01/2022    EXTPROTEINUA 20 03/07/2023    EXTWBCUA 0-2 03/07/2023    EXTRBCUA 0-2 03/07/2023       Labs were reviewed with the patient.    Assessment:     1. CKD (chronic kidney disease) stage 2, GFR 60-89 ml/min    2. S/P kidney transplant    3. Essential hypertension    4. Immunosuppressive management encounter following kidney transplant    5. Membranous glomerulonephritis    6. Stroke x3 in 2014 and 2015    7. Type 2 diabetes mellitus with diabetic nephropathy, with long-term current use of insulin        Plan:       1. CKD stage 2 with excellent creatinine : will continue follow up as per our center guidelines. patient to continue close follow up with the local General nephrologist. Education provided in appropriate fluid intake, potassium intake. Continue with oral hydration.    1A. Pancreatic Function: N/A for non-pancreas allograft recipients:     2. High risk immunosuppression medication for organ transplantation, requiring regular intensive follow up and monitoring : prograf dose and level at target, MMF 1000 bid and prednisone per protocol.   Lab Results   Component Value Date    TACROLIMUS 9.6 04/15/2021    TACROLIMUS 9.5 04/12/2021    TACROLIMUS 9.9 04/07/2021     No results found for: CYCLOSPORINE  @   Will closely monitor for toxicities, education provided about adherence to medicines and need to communicate any side effects to the transplant nurse or physician.    3. Allograft Function:stable at baseline for the patient. Continue follow up  as per our guidelines and with the local General nephrologist. Communication will be sent today.  Lab Results   Component Value Date    CREATININE 1.1 04/15/2021    CREATININE 1.0 04/12/2021    CREATININE 1.0 04/09/2021     No results found for: AMYLASE, LIPASE    4. Hypertension management: well controlled.  Continue with home blood pressure monitoring, low salt and healthy life discussed with the patient..    5. Metabolic Bone Disease/Secondary Hyperparathyroidism:calcium and phosphorus level discussed with the patient, patient will continue follow up with the general nephrologist for management of metabolic bone disease. Will monitor PTH and Vit D per our transplant center guidelines.      Lab Results   Component Value Date    .0 (H) 03/25/2021    CALCIUM 9.3 04/15/2021    PHOS 1.6 (L) 04/15/2021    PHOS 1.6 (L) 04/12/2021    PHOS 1.6 (L) 04/09/2021       6. Electrolytes and acid base balance: will lower magnesium to 400 bid, she will d/c Phosphorus. reviewed with the patient, essentially within the normal range no need for acute changes today, will monitor as per our center guidelines.     Lab Results   Component Value Date     04/15/2021    K 4.5 04/15/2021     (H) 04/15/2021    CO2 22 (L) 04/15/2021    CO2 21 (L) 04/12/2021    CO2 23 04/09/2021       7. Anemia: will continue monitoring as per our center guidelines. No indication for acute intervention today.     Lab Results   Component Value Date    WBC 13.29 (H) 04/15/2021    HGB 9.8 (L) 04/15/2021    HCT 32.5 (L) 04/15/2021    MCV 98 04/15/2021     04/15/2021       8.Proteinuria: will continue with pr/cr ratio as per our center guidelines.  Lab Results   Component Value Date    PROTEINURINE 25 (H) 04/05/2021    CREATRANDUR 108.0 04/05/2021    UTPCR 0.23 (H) 04/05/2021    UTPCR 1.03 (H) 03/30/2021    UTPCR 0.97 (H) 03/29/2021        9. BK virus infection screening: will continue with urine or blood PCR as per our guidelines to  prevent BK virus viremia and allograft dysfunction  No results found for: BKVIRUSDNAUR, BKQUANTURINE, BKVIRUSLOG, BKVIRUSURINE, BKVIRUSPCRQB      10. Weight and metabolic management: education provided provided during the clinic visit.   Body mass index is 26.04 kg/m².       11.Patient safety education regarding immunosuppression including prophylaxis posttransplant for CMV, PCP : Education provided about vaccination and prevention of infections.    12.  Cytopenias: no significant cytopenias will monitor as per our guidelines. Medicine list reviewed including potential causes of drug-induced cytopenias     Lab Results   Component Value Date    WBC 13.29 (H) 04/15/2021    HGB 9.8 (L) 04/15/2021    HCT 32.5 (L) 04/15/2021    MCV 98 04/15/2021     04/15/2021       13. Post-transplant Prophylaxis; CMV Infection, PJP and Candida mucosistis and other indicated for this particular patient. OFF prophylaxis >6 months posttransplant    I spoke with the patient for 30 minutes. More than half dedicated to counseling and education. All questions answered    Dino Torres MD  Transplant Nephrology            Follow-up:   Clinic: return to transplant clinic weekly for the first month after transplant; every 2 weeks during months 2-3; then at 6-, 9-, 12-, 18-, 24-, and 36- months post-transplant to reassess for complications from immunosuppression toxicity and monitor for rejection.  Annually thereafter.    Labs: since patient remains at high risk for rejection and drug-related complications that warrant close monitoring, labs will be ordered as follows: continue twice weekly CBC, renal panel, and drug level for first month; then same labs once weekly through 3rd month post-transplant.  Urine for UA and protein/creatinine ratio monthly.  Serum BK - PCR at 1-, 3-, 6-, 9-, 12-, 18-, 24-, 36- 48-, and 60 months post-transplant.  Hepatic panel at 1-, 2-, 3-, 6-, 9-, 12-, 18-, 24-, and 36- months post-transplant.    Dino TAYLOR  MD Melissa       Education:   Material provided to the patient.  Patient reminded to call with any health changes since these can be early signs of significant complications.  Also, I advised the patient to be sure any new medications or changes of old medications are discussed with either a pharmacist or physician knowledgeable with transplant to avoid rejection/drug toxicity related to significant drug interactions.    Patient advised that it is recommended that all transplanted patients, and their close contacts and household members receive Covid vaccination.    UNOS Patient Status  Functional Status: 100% - Normal, no complaints, no evidence of disease  Physical Capacity: No Limitations

## 2023-06-13 DIAGNOSIS — Z94.0 KIDNEY REPLACED BY TRANSPLANT: Primary | ICD-10-CM

## 2023-06-14 RX ORDER — PREDNISONE 5 MG/1
5 TABLET ORAL DAILY
Qty: 90 TABLET | Refills: 3 | Status: SHIPPED | OUTPATIENT
Start: 2023-06-14 | End: 2024-06-13

## 2023-06-14 NOTE — TELEPHONE ENCOUNTER
"----- Message from Raquel Thomas sent at 6/14/2023  3:20 PM CDT -----  Regarding: Rx refill  Contact: Emma Benton (Shirley)  RX Name:predniSONE (DELTASONE) 5 MG tablet    How is it taken:Take 4 tablets (20mg) by mouth daily 3/27- 4/24, then take 3 tablets (15mg) daily 4/25-5/25, then take 2 tablets (10mg) 5/26-6/25, then take 1 tablet (5mg) daily 6/26/21-    Quantity: 120 tablet       Preferred Pharmacy with phone number:    [id number] - [facility] [address] AT [facility] [address]  [address]  [place] [address]  Phone: [phone] Fax: [phone]      Ordering Provider: Dr. Torres       Contact Preference: [phone] (home)      Add INFO: Patient calling to get Rx refills.              "

## 2024-03-01 ENCOUNTER — PATIENT MESSAGE (OUTPATIENT)
Dept: TRANSPLANT | Facility: CLINIC | Age: 74
End: 2024-03-01
Payer: MEDICARE

## 2024-03-06 ENCOUNTER — DOCUMENTATION ONLY (OUTPATIENT)
Dept: TRANSPLANT | Facility: CLINIC | Age: 74
End: 2024-03-06
Payer: MEDICARE

## 2024-03-06 LAB
EXT ALBUMIN: 4.5 (ref 3.5–5.2)
EXT ALKALINE PHOSPHATASE: 45 (ref 35–105)
EXT ALT: 16 (ref 0–33)
EXT ANC: ABNORMAL
EXT AST: 14 (ref 0–32)
EXT BACTERIA UA: ABNORMAL
EXT BILIRUBIN DIRECT: <0.2 MG/DL (ref 0–0.3)
EXT BILIRUBIN TOTAL: 0.24 (ref 0–1.2)
EXT BK VIRUS DNA QN PCR: ABNORMAL
EXT BUN: 28.3 (ref 8–23)
EXT CALCIUM: 9.9 (ref 8.6–10.2)
EXT CHLORIDE: 105 (ref 98–107)
EXT CO2: 25 (ref 22–29)
EXT CREATININE UA: 112.6
EXT CREATININE: 0.82 MG/DL (ref 0.5–0.9)
EXT EGFR NO RACE VARIABLE: 74.55
EXT EOSINOPHIL%: 1.2 (ref 0.7–7)
EXT GLUCOSE UA: NEGATIVE
EXT GLUCOSE: 156 (ref 82–115)
EXT HEMATOCRIT: 44 (ref 37–47)
EXT HEMOGLOBIN: 14.5 (ref 12–16)
EXT LYMPH%: 35.8 (ref 19.3–53.1)
EXT MAGNESIUM: 1.9 (ref 1.6–2.4)
EXT MONOCYTES%: 11.8 (ref 4.7–12.5)
EXT NITRITES UA: NEGATIVE
EXT PHOSPHORUS: 2.6 (ref 2.5–4.5)
EXT PLATELETS: 222 (ref 135–400)
EXT POTASSIUM: 4.4 (ref 3.5–5.1)
EXT PROT/CREAT RATIO UR: 0.1
EXT PROTEIN TOTAL: 6.7 (ref 6.4–8.3)
EXT PROTEIN UA: 25
EXT RBC UA: NEGATIVE
EXT SEGS%: 50.2 (ref 34–71.1)
EXT SODIUM: 141 MMOL/L (ref 136–145)
EXT TACROLIMUS LVL: ABNORMAL
EXT URINE PROTEIN: 11.36
EXT WBC UA: NEGATIVE
EXT WBC: 9.39 (ref 4.3–10.8)

## 2024-03-07 NOTE — PROGRESS NOTES
"   Kidney Post-Transplant Assessment    Referring Physician: Al Neves  Current Nephrologist: Al Neves    ORGAN: LEFT KIDNEY  Donor Type: donation after circulatory death   PHS Increased Risk: yes  Cold Ischemia: 862 mins  Induction Medications: simulect - basiliximab    Subjective:     CC:  Reassessment of renal allograft function and management of chronic immunosuppression.    HPI:  Ms. Benton is a 74 y.o. year old White female who received a donation after circulatory death  kidney transplant on 3/25/21.  She has CKD stage 2 - GFR 60-89 and her baseline creatinine is between 0.8. She takes mycophenolate mofetil, prednisone, and tacrolimus for maintenance immunosuppression. She denies any recent hospitalizations or ER visits since her previous clinic visit.    patient had an abnormal mammogram in October 2024, found with "localized" cancer in the left breast and underwent 20 sections of radiotherapy,last one in January 10 2024, and she said that everything "is fine". She described initially a procedure to do a biopsy and later a procedure under general anesthesia to remove the mass. She is also getting injections "every 6 months" for "my bones"  She was found not a candidate for chemotherapy due to "my kidney"  Her local nephrologist reduced her tacro 1.5 mg AM and 2 mg PM  She is also taking Anastrozole 1 mg daily  Mycophenolate 500 bid  Patient was told the her cancer is under control and is scheduled for mammograms every 6 months. She underwent a genetic tests  but she did not understand the results, she does not remember very well the result.          Review of Systems    Skin: no skin rash  CNS; no headaches, blurred vision, seizure, or syncope  ENT: No JVD,  Adenopathies,  nasal congestion. No oral lesions  Cardiac: No chest pain, dyspnea, claudication, edema or palpitations  Respiratory: No SOB, cough, hemoptysis   Gastro-intestinal: No diarrhea, constipation, abdominal pain, nausea, vomit. No " ascitis  Genitourinary: no hematuria, dysuria, frequency, frequency  Musculoskeletal: joint pain, arthritis or vasculitic changes  Psych: alert awake, oriented, No cranial nerves deficit.      Objective:         Physical Exam    No PE VV.    Labs:  Lab Results   Component Value Date    WBC 13.29 (H) 04/15/2021    HGB 9.8 (L) 04/15/2021    HCT 32.5 (L) 04/15/2021     04/15/2021    K 4.5 04/15/2021     (H) 04/15/2021    CO2 22 (L) 04/15/2021    BUN 20 04/15/2021    CREATININE 1.1 04/15/2021    CALCIUM 9.3 04/15/2021    PHOS 1.6 (L) 04/15/2021    MG 1.7 04/15/2021    ALBUMIN 3.7 04/15/2021    AST 20 04/12/2021    ALT 30 04/12/2021    UTPCR 0.23 (H) 04/05/2021    .0 (H) 03/25/2021    TACROLIMUS 9.6 04/15/2021       Lab Results   Component Value Date    EXTANC  09/06/2022      Comment:      KIDNEY; 18 month protocol labs; RTC appt 9/20/22    EXTWBC 9.39 03/06/2024    EXTSEGS 50.2 03/06/2024    EXTPLATELETS 222 03/06/2024    EXTHEMOGLOBI 14.5 03/06/2024    EXTHEMATOCRI 44 03/06/2024    EXTCREATININ 0.82 03/06/2024    EXTCREATININ 112.6 03/06/2024    EXTSODIUM 141 03/06/2024    EXTPOTASSIUM 4.4 03/06/2024    EXTBUN 28.3 (A) 03/06/2024    EXTCO2 25 03/06/2024    EXTCALCIUM 9.9 03/06/2024    EXTPHOSPHORU 2.6 03/06/2024    EXTGLUCOSE 156 (A) 03/06/2024    EXTALBUMIN 4.5 03/06/2024    EXTAST 14 03/06/2024    EXTALT 16 03/06/2024    EXTBILITOTAL 0.24 03/06/2024       Lab Results   Component Value Date    EXTTACROLVL Pending 03/06/2024    EXTPROTCRE 0.10 03/06/2024    EXTPTHINTACT 106 (A) 03/01/2022    EXTPROTEINUA 25 03/06/2024    EXTWBCUA Negative 03/06/2024    EXTRBCUA Negative 03/06/2024       Labs were reviewed with the patient.    Assessment:     1. Immunosuppressive management encounter following kidney transplant    2. S/P kidney transplant    3. Type 2 diabetes mellitus with diabetic nephropathy, with long-term current use of insulin    4. Essential hypertension    5. CKD (chronic kidney disease)  "stage 2, GFR 60-89 ml/min    6. Membranous glomerulonephritis        Plan:   1. CKD stage 2 with excellent creatinine: will continue follow up as per our center guidelines. patient to continue close follow up with the local General nephrologist. Education provided in appropriate fluid intake, potassium intake. Continue with oral hydration.    1A. Pancreatic Function: N/A for non-pancreas allograft recipients:     2. High risk immunosuppression medication for organ transplantation, requiring regular intensive follow up and monitoring : prograf level pending  bid. Will review current literature in terms of benefits or not of lowering immunosuppression in early stage of breast cancer. Will get records from her oncologist to better understand the stage histologic type and treatment that she received. Her pretransplant PRA was 0%, never had rejections  with no DSA, so technically her immunologic risk is low   Lab Results   Component Value Date    TACROLIMUS 9.6 04/15/2021    TACROLIMUS 9.5 04/12/2021    TACROLIMUS 9.9 04/07/2021     No results found for: "CYCLOSPORINE"  @   Will closely monitor for toxicities, education provided about adherence to medicines and need to communicate any side effects to the transplant nurse or physician.    3. Allograft Function:stable at baseline for the patient. Continue follow up as per our guidelines and with the local General nephrologist. Communication will be sent today.  Lab Results   Component Value Date    CREATININE 1.1 04/15/2021    CREATININE 1.0 04/12/2021    CREATININE 1.0 04/09/2021     No results found for: "AMYLASE", "LIPASE"    4. Hypertension management:  well controlled. Continue with home blood pressure monitoring, low salt and healthy life discussed with the patient..    5. Metabolic Bone Disease/Secondary Hyperparathyroidism:calcium and phosphorus level discussed with the patient, patient will continue follow up with the general nephrologist for " "management of metabolic bone disease. Will monitor PTH and Vit D per our transplant center guidelines.      Lab Results   Component Value Date    .0 (H) 03/25/2021    CALCIUM 9.3 04/15/2021    PHOS 1.6 (L) 04/15/2021    PHOS 1.6 (L) 04/12/2021    PHOS 1.6 (L) 04/09/2021       6. Electrolytes and acid base balance: reviewed with the patient, essentially within the normal range no need for acute changes today, will monitor as per our center guidelines.         7. Anemia: normal h/h will continue monitoring as per our center guidelines. No indication for acute intervention today.         8.Proteinuria: will continue with pr/cr ratio as per our center guidelines.  Lab Results   Component Value Date    PROTEINURINE 25 (H) 04/05/2021    CREATRANDUR 108.0 04/05/2021    UTPCR 0.23 (H) 04/05/2021    UTPCR 1.03 (H) 03/30/2021    UTPCR 0.97 (H) 03/29/2021        9. BK virus infection screening: will continue with urine or blood PCR as per our guidelines to prevent BK virus viremia and allograft dysfunction  No results found for: "BKVIRUSDNAUR", "BKQUANTURINE", "BKVIRUSLOG", "BKVIRUSURINE", "BKVIRUSPCRQB"      10. Weight and metabolic management: education provided provided during the clinic visit.   There is no height or weight on file to calculate BMI.       11.Patient safety education regarding immunosuppression including prophylaxis posttransplant for CMV, PCP : Education provided about vaccination and prevention of infections.    12.  Cytopenias: no significant cytopenias will monitor as per our guidelines. Medicine list reviewed including potential causes of drug-induced cytopenias     Lab Results   Component Value Date    WBC 13.29 (H) 04/15/2021    HGB 9.8 (L) 04/15/2021    HCT 32.5 (L) 04/15/2021    MCV 98 04/15/2021     04/15/2021       13. Post-transplant Prophylaxis; CMV Infection, PJP and Candida mucosistis and other indicated for this particular patient. OFF prophylaxis 3 year anniversary soon.     I " spoke with the patient for 30 minutes. More than half dedicated to counseling and education. All questions answered    Dino Torres MD  Transplant Nephrology            Follow-up:   Clinic: return to transplant clinic weekly for the first month after transplant; every 2 weeks during months 2-3; then at 6-, 9-, 12-, 18-, 24-, and 36- months post-transplant to reassess for complications from immunosuppression toxicity and monitor for rejection.  Annually thereafter.    Labs: since patient remains at high risk for rejection and drug-related complications that warrant close monitoring, labs will be ordered as follows: continue twice weekly CBC, renal panel, and drug level for first month; then same labs once weekly through 3rd month post-transplant.  Urine for UA and protein/creatinine ratio monthly.  Serum BK - PCR at 1-, 3-, 6-, 9-, 12-, 18-, 24-, 36- 48-, and 60 months post-transplant.  Hepatic panel at 1-, 2-, 3-, 6-, 9-, 12-, 18-, 24-, and 36- months post-transplant.    Dino Torres MD       Education:   Material provided to the patient.  Patient reminded to call with any health changes since these can be early signs of significant complications.  Also, I advised the patient to be sure any new medications or changes of old medications are discussed with either a pharmacist or physician knowledgeable with transplant to avoid rejection/drug toxicity related to significant drug interactions.    Patient advised that it is recommended that all transplanted patients, and their close contacts and household members receive Covid vaccination.    UNOS Patient Status  Functional Status: 100% - Normal, no complaints, no evidence of disease  Physical Capacity: No Limitations

## 2024-03-08 ENCOUNTER — TELEPHONE (OUTPATIENT)
Dept: TRANSPLANT | Facility: CLINIC | Age: 74
End: 2024-03-08

## 2024-03-08 ENCOUNTER — PATIENT MESSAGE (OUTPATIENT)
Dept: TRANSPLANT | Facility: CLINIC | Age: 74
End: 2024-03-08
Payer: MEDICARE

## 2024-03-08 ENCOUNTER — OFFICE VISIT (OUTPATIENT)
Dept: TRANSPLANT | Facility: CLINIC | Age: 74
End: 2024-03-08
Payer: MEDICARE

## 2024-03-08 DIAGNOSIS — N05.2 MEMBRANOUS GLOMERULONEPHRITIS: ICD-10-CM

## 2024-03-08 DIAGNOSIS — I10 ESSENTIAL HYPERTENSION: Chronic | ICD-10-CM

## 2024-03-08 DIAGNOSIS — Z94.0 S/P KIDNEY TRANSPLANT: ICD-10-CM

## 2024-03-08 DIAGNOSIS — Z79.899 IMMUNOSUPPRESSIVE MANAGEMENT ENCOUNTER FOLLOWING KIDNEY TRANSPLANT: Primary | ICD-10-CM

## 2024-03-08 DIAGNOSIS — N18.2 CKD (CHRONIC KIDNEY DISEASE) STAGE 2, GFR 60-89 ML/MIN: ICD-10-CM

## 2024-03-08 DIAGNOSIS — Z79.4 TYPE 2 DIABETES MELLITUS WITH DIABETIC NEPHROPATHY, WITH LONG-TERM CURRENT USE OF INSULIN: ICD-10-CM

## 2024-03-08 DIAGNOSIS — Z94.0 IMMUNOSUPPRESSIVE MANAGEMENT ENCOUNTER FOLLOWING KIDNEY TRANSPLANT: Primary | ICD-10-CM

## 2024-03-08 DIAGNOSIS — E11.21 TYPE 2 DIABETES MELLITUS WITH DIABETIC NEPHROPATHY, WITH LONG-TERM CURRENT USE OF INSULIN: ICD-10-CM

## 2024-03-08 PROCEDURE — 99215 OFFICE O/P EST HI 40 MIN: CPT | Mod: 95,,, | Performed by: INTERNAL MEDICINE

## 2024-03-08 PROCEDURE — 3066F NEPHROPATHY DOC TX: CPT | Mod: CPTII,95,, | Performed by: INTERNAL MEDICINE

## 2024-03-08 NOTE — TELEPHONE ENCOUNTER
Will contact patient's oncologist office for her records.     ----- Message from Dino Torres MD sent at 3/8/2024  3:18 PM CST -----  She was diagnosed with breast cancer in October 2023 and underwent RaTx x 20 sections, lumpectomy and anastrozole currently. See if we can get some records from her oncologist to have precise data in terms of cancer stage histologic type and treatments and let me know please. Thanks

## 2024-03-08 NOTE — LETTER
March 8, 2024        Al Neves  [address]  [place] [address]  Phone: [phone]  Fax: [phone]             [facility]- Trans[facility] 1st Fl  [address]  [facility] [address]  Phone: [phone]   Patient: Joy Benton   MR Number: [id number]   YOB: 1950   Date of Visit: 3/8/2024       Dear Dr. Al Neves    Thank you for referring Joy Benton to me for evaluation. Attached you will find relevant portions of my assessment and plan of care.    If you have questions, please do not hesitate to call me. I look forward to following Joy Benton along with you.    Sincerely,    Dino Torres MD    Enclosure    If you would like to receive this communication electronically, please contact [email or web address] or [phone] to request [email or web address] Link access.    [email or web address] Link is a tool which provides read-only access to select patient information with whom you have a relationship. Its easy to use and provides real time access to review your patients record including encounter summaries, notes, results, and demographic information.    If you feel you have received this communication in error or would no longer like to receive these types of communications, please e-mail [email or web address]

## 2024-05-09 ENCOUNTER — TELEPHONE (OUTPATIENT)
Dept: TRANSPLANT | Facility: CLINIC | Age: 74
End: 2024-05-09
Payer: MEDICARE

## 2024-05-09 NOTE — TELEPHONE ENCOUNTER
Coordinator attempted to contact patient again but was unsuccessful. Coordinator trying to contact patient to get name of her oncologist in order to get her records. Left message on patient's voicemail to return coordinator's call.       ---- Message -----   From: Dino Torres MD   Sent: 3/8/2024   3:21 PM CST   To: [facility]     She was diagnosed with breast cancer in October 2023 and underwent RaTx x 20 sections, lumpectomy and anastrozole currently. See if we can get some records from her oncologist to have precise data in terms of cancer stage histologic type and treatments and let me know please. Thanks

## 2024-07-11 DIAGNOSIS — Z94.0 KIDNEY REPLACED BY TRANSPLANT: Primary | ICD-10-CM

## 2024-07-11 NOTE — TELEPHONE ENCOUNTER
Patient returned coordinator's call. Patient apologized for not returning the call back in May but forgot due to her multiple doctors appointments.   Coordinator informed patient Dr. Torres requested her Oncologist records but needed we didn't have the name of her Oncologist. Patient states her Oncologist name is Dr. Reginaldo Guerra # [phone].    Confirmed patient's current doses of immuno.    Patient reports she's currently taking mycophenolate 1,000mg (500mg tablets) twice daily; tacrolimus 1.5mg in the PM & 2mg in the PM and prednisone 5mg daily. Patient requesting medication refills for mycophenolate & prednisone be sent to [facility] pharmacy.

## 2024-07-12 RX ORDER — PREDNISONE 5 MG/1
5 TABLET ORAL DAILY
Qty: 90 TABLET | Refills: 3 | Status: SHIPPED | OUTPATIENT
Start: 2024-07-12

## 2024-07-12 RX ORDER — MYCOPHENOLATE MOFETIL 500 MG/1
1000 TABLET ORAL 2 TIMES DAILY
Qty: 120 TABLET | Refills: 11 | Status: SHIPPED | OUTPATIENT
Start: 2024-07-12 | End: 2025-07-12

## 2024-07-12 RX ORDER — TACROLIMUS 0.5 MG/1
CAPSULE ORAL
Qty: 210 CAPSULE | Refills: 11 | Status: SHIPPED | OUTPATIENT
Start: 2024-07-12

## (undated) DEVICE — CLIPPER BLADE MOD 4406 (CAREF)

## (undated) DEVICE — SPONGE IV DRAIN 4X4 STERILE

## (undated) DEVICE — SUT SILK 2-0 STRANDS 30IN

## (undated) DEVICE — DRESSING ABSRBNT ISLAND 3.6X8

## (undated) DEVICE — BELLOW CANN HEMOBLAST 1.65GR

## (undated) DEVICE — SEE MEDLINE ITEM 146417

## (undated) DEVICE — HEMOSTAT SURGICEL NU-KNIT 6X9

## (undated) DEVICE — SOL NS 1000CC

## (undated) DEVICE — FOLEY BLLN 20FR 3WAY 5CC

## (undated) DEVICE — ELECTRODE REM PLYHSV RETURN 9

## (undated) DEVICE — SYR ONLY LUER LOCK 20CC

## (undated) DEVICE — STAPLER SKIN PROXIMATE WIDE

## (undated) DEVICE — SUT PDS BV 6-0

## (undated) DEVICE — TOWEL OR XRAY WHITE 17X26IN

## (undated) DEVICE — PUNCH AORTIC 4.8MM

## (undated) DEVICE — SUT 1 36IN PDS II VIO MONO

## (undated) DEVICE — SEE MEDLINE ITEM 156900

## (undated) DEVICE — SUT 4-0 12-18IN SILK BLACK

## (undated) DEVICE — PLUG CATHETER STERILE FOLEY

## (undated) DEVICE — SUT 2-0 12-18IN SILK

## (undated) DEVICE — MARKER SKIN STND TIP BLUE BARR

## (undated) DEVICE — SUT VICRYL 3-0 27 SH

## (undated) DEVICE — SUT ETHILON 3-0 PS2 18 BLK

## (undated) DEVICE — SUT 3-0 12-18IN SILK

## (undated) DEVICE — SET DECANTER MEDICHOICE

## (undated) DEVICE — SUT SILK 3-0 STRANDS 30IN

## (undated) DEVICE — TRAY FOLEY 16FR INFECTION CONT

## (undated) DEVICE — SUT PROLENE 5-0 36IN C-1

## (undated) DEVICE — SUT 4/0 27IN PDS II VIO MON

## (undated) DEVICE — SET IRR URLGY 2LINE UNIV SPIKE

## (undated) DEVICE — BACITRACIN ZINC OINT 0.9GM

## (undated) DEVICE — STOCKINETTE 2INX36

## (undated) DEVICE — SUT PROLENE 6-0 BV-1

## (undated) DEVICE — DRAPE SLUSH WARMER WITH DISC